# Patient Record
Sex: MALE | Race: WHITE | Employment: OTHER | URBAN - METROPOLITAN AREA
[De-identification: names, ages, dates, MRNs, and addresses within clinical notes are randomized per-mention and may not be internally consistent; named-entity substitution may affect disease eponyms.]

---

## 2017-01-22 ENCOUNTER — LAB ENCOUNTER (OUTPATIENT)
Dept: LAB | Facility: HOSPITAL | Age: 67
End: 2017-01-22
Attending: Other
Payer: COMMERCIAL

## 2017-01-22 DIAGNOSIS — Z79.899 NEED FOR PROPHYLACTIC CHEMOTHERAPY: Primary | ICD-10-CM

## 2017-01-22 LAB
ALBUMIN SERPL-MCNC: 3.8 G/DL (ref 3.5–4.8)
ALP LIVER SERPL-CCNC: 26 U/L (ref 45–117)
ALT SERPL-CCNC: 27 U/L (ref 17–63)
AST SERPL-CCNC: 9 U/L (ref 15–41)
BASOPHILS # BLD AUTO: 0.06 X10(3) UL (ref 0–0.1)
BASOPHILS NFR BLD AUTO: 0.8 %
BILIRUB SERPL-MCNC: 0.5 MG/DL (ref 0.1–2)
BUN BLD-MCNC: 15 MG/DL (ref 8–20)
CALCIUM BLD-MCNC: 8.9 MG/DL (ref 8.3–10.3)
CHLORIDE: 104 MMOL/L (ref 101–111)
CO2: 31 MMOL/L (ref 22–32)
CREAT BLD-MCNC: 0.85 MG/DL (ref 0.7–1.3)
EOSINOPHIL # BLD AUTO: 0.35 X10(3) UL (ref 0–0.3)
EOSINOPHIL NFR BLD AUTO: 4.7 %
ERYTHROCYTE [DISTWIDTH] IN BLOOD BY AUTOMATED COUNT: 12.2 % (ref 11.5–16)
EST. AVERAGE GLUCOSE BLD GHB EST-MCNC: 163 MG/DL (ref 68–126)
GLUCOSE BLD-MCNC: 146 MG/DL (ref 70–99)
HBA1C MFR BLD HPLC: 7.3 % (ref ?–5.7)
HCT VFR BLD AUTO: 42.4 % (ref 37–53)
HGB BLD-MCNC: 14.3 G/DL (ref 13–17)
IMMATURE GRANULOCYTE COUNT: 0.01 X10(3) UL (ref 0–1)
IMMATURE GRANULOCYTE RATIO %: 0.1 %
LYMPHOCYTES # BLD AUTO: 2.57 X10(3) UL (ref 0.9–4)
LYMPHOCYTES NFR BLD AUTO: 34.5 %
M PROTEIN MFR SERPL ELPH: 7.5 G/DL (ref 6.1–8.3)
MCH RBC QN AUTO: 30.8 PG (ref 27–33.2)
MCHC RBC AUTO-ENTMCNC: 33.7 G/DL (ref 31–37)
MCV RBC AUTO: 91.2 FL (ref 80–99)
MONOCYTES # BLD AUTO: 0.66 X10(3) UL (ref 0.1–0.6)
MONOCYTES NFR BLD AUTO: 8.9 %
NEUTROPHIL ABS PRELIM: 3.8 X10 (3) UL (ref 1.3–6.7)
NEUTROPHILS # BLD AUTO: 3.8 X10(3) UL (ref 1.3–6.7)
NEUTROPHILS NFR BLD AUTO: 51 %
PLATELET # BLD AUTO: 324 10(3)UL (ref 150–450)
POTASSIUM SERPL-SCNC: 4.4 MMOL/L (ref 3.6–5.1)
RBC # BLD AUTO: 4.65 X10(6)UL (ref 3.8–5.8)
RED CELL DISTRIBUTION WIDTH-SD: 40.4 FL (ref 35.1–46.3)
SODIUM SERPL-SCNC: 141 MMOL/L (ref 136–144)
WBC # BLD AUTO: 7.5 X10(3) UL (ref 4–13)

## 2017-01-22 PROCEDURE — 85025 COMPLETE CBC W/AUTO DIFF WBC: CPT

## 2017-01-22 PROCEDURE — 83036 HEMOGLOBIN GLYCOSYLATED A1C: CPT

## 2017-01-22 PROCEDURE — 80053 COMPREHEN METABOLIC PANEL: CPT

## 2017-03-10 ENCOUNTER — OFFICE VISIT (OUTPATIENT)
Dept: INTERNAL MEDICINE CLINIC | Facility: CLINIC | Age: 67
End: 2017-03-10

## 2017-03-10 ENCOUNTER — APPOINTMENT (OUTPATIENT)
Dept: LAB | Age: 67
End: 2017-03-10
Attending: INTERNAL MEDICINE
Payer: COMMERCIAL

## 2017-03-10 VITALS
WEIGHT: 188.19 LBS | HEIGHT: 70 IN | HEART RATE: 68 BPM | RESPIRATION RATE: 16 BRPM | SYSTOLIC BLOOD PRESSURE: 134 MMHG | DIASTOLIC BLOOD PRESSURE: 74 MMHG | BODY MASS INDEX: 26.94 KG/M2

## 2017-03-10 DIAGNOSIS — Z12.11 SPECIAL SCREENING FOR MALIGNANT NEOPLASMS, COLON: ICD-10-CM

## 2017-03-10 DIAGNOSIS — Z00.00 ROUTINE GENERAL MEDICAL EXAMINATION AT A HEALTH CARE FACILITY: Primary | ICD-10-CM

## 2017-03-10 DIAGNOSIS — IMO0001 UNCONTROLLED TYPE 2 DIABETES MELLITUS WITHOUT COMPLICATION, WITHOUT LONG-TERM CURRENT USE OF INSULIN: ICD-10-CM

## 2017-03-10 DIAGNOSIS — K40.90 LEFT INGUINAL HERNIA: ICD-10-CM

## 2017-03-10 DIAGNOSIS — E11.65 TYPE 2 DIABETES MELLITUS WITH HYPERGLYCEMIA, WITHOUT LONG-TERM CURRENT USE OF INSULIN (HCC): ICD-10-CM

## 2017-03-10 LAB
CHOLEST SMN-MCNC: 152 MG/DL (ref ?–200)
CREAT UR-SCNC: 54.8 MG/DL
HDLC SERPL-MCNC: 44 MG/DL (ref 45–?)
HDLC SERPL: 3.45 {RATIO} (ref ?–4.97)
LDLC SERPL CALC-MCNC: 83 MG/DL (ref ?–130)
MICROALBUMIN UR-MCNC: <0.5 MG/DL
NONHDLC SERPL-MCNC: 108 MG/DL (ref ?–130)
TRIGLYCERIDES: 127 MG/DL (ref ?–150)
VLDL: 25 MG/DL (ref 5–40)

## 2017-03-10 PROCEDURE — 90670 PCV13 VACCINE IM: CPT | Performed by: INTERNAL MEDICINE

## 2017-03-10 PROCEDURE — 82570 ASSAY OF URINE CREATININE: CPT

## 2017-03-10 PROCEDURE — 90471 IMMUNIZATION ADMIN: CPT | Performed by: INTERNAL MEDICINE

## 2017-03-10 PROCEDURE — 90715 TDAP VACCINE 7 YRS/> IM: CPT | Performed by: INTERNAL MEDICINE

## 2017-03-10 PROCEDURE — 90472 IMMUNIZATION ADMIN EACH ADD: CPT | Performed by: INTERNAL MEDICINE

## 2017-03-10 PROCEDURE — 80061 LIPID PANEL: CPT

## 2017-03-10 PROCEDURE — 99387 INIT PM E/M NEW PAT 65+ YRS: CPT | Performed by: INTERNAL MEDICINE

## 2017-03-10 PROCEDURE — 82043 UR ALBUMIN QUANTITATIVE: CPT

## 2017-03-10 RX ORDER — MEMANTINE HYDROCHLORIDE 10 MG/1
TABLET ORAL
Refills: 4 | COMMUNITY
Start: 2017-02-09 | End: 2017-03-10

## 2017-03-10 NOTE — PROGRESS NOTES
HPI:    Patient ID: Erin Spencer is a 73year old male. HPI  New pt here to establish as new pt, states uncontrolled dm, htn, hyperchol, states his wife is a psychiatrist who gives him namenda for decreased memory.   Pt states has not had an official time. He appears well-developed and well-nourished. HENT:   Head: Normocephalic and atraumatic. Mouth/Throat: No oropharyngeal exudate. Eyes: Pupils are equal, round, and reactive to light. Neck: Neck supple.    Cardiovascular: Normal rate, regular

## 2017-03-16 ENCOUNTER — TELEPHONE (OUTPATIENT)
Dept: INTERNAL MEDICINE CLINIC | Facility: CLINIC | Age: 67
End: 2017-03-16

## 2017-03-16 ENCOUNTER — OFFICE VISIT (OUTPATIENT)
Dept: INTERNAL MEDICINE CLINIC | Facility: CLINIC | Age: 67
End: 2017-03-16

## 2017-03-16 VITALS
SYSTOLIC BLOOD PRESSURE: 134 MMHG | DIASTOLIC BLOOD PRESSURE: 87 MMHG | HEART RATE: 112 BPM | BODY MASS INDEX: 26.77 KG/M2 | WEIGHT: 187 LBS | HEIGHT: 70 IN | RESPIRATION RATE: 16 BRPM | TEMPERATURE: 99 F

## 2017-03-16 DIAGNOSIS — R07.81 PLEURITIC CHEST PAIN: Primary | ICD-10-CM

## 2017-03-16 DIAGNOSIS — R53.81 MALAISE AND FATIGUE: ICD-10-CM

## 2017-03-16 DIAGNOSIS — R52 BODY ACHES: ICD-10-CM

## 2017-03-16 DIAGNOSIS — R53.83 MALAISE AND FATIGUE: ICD-10-CM

## 2017-03-16 DIAGNOSIS — J06.9 URI, ACUTE: ICD-10-CM

## 2017-03-16 PROBLEM — IMO0002 UNCONTROLLED TYPE 2 DIABETES MELLITUS: Status: ACTIVE | Noted: 2017-03-16

## 2017-03-16 PROBLEM — E11.65 UNCONTROLLED TYPE 2 DIABETES MELLITUS (HCC): Status: ACTIVE | Noted: 2017-03-16

## 2017-03-16 PROCEDURE — 93000 ELECTROCARDIOGRAM COMPLETE: CPT | Performed by: PHYSICIAN ASSISTANT

## 2017-03-16 PROCEDURE — 99213 OFFICE O/P EST LOW 20 MIN: CPT | Performed by: PHYSICIAN ASSISTANT

## 2017-03-16 RX ORDER — CIPROFLOXACIN 500 MG/1
1 TABLET, FILM COATED ORAL 2 TIMES DAILY
COMMUNITY
Start: 2017-03-14 | End: 2017-04-21

## 2017-03-16 RX ORDER — AMOXICILLIN AND CLAVULANATE POTASSIUM 500; 125 MG/1; MG/1
1 TABLET, FILM COATED ORAL 3 TIMES DAILY
COMMUNITY
Start: 2017-03-14 | End: 2017-04-21 | Stop reason: ALTCHOICE

## 2017-03-16 NOTE — PROGRESS NOTES
Patient presents with:  Chest Pain: this morning pt states he is not having chest pain now. Reaction: to injection pt had swelling on both hands mostly on the left.  Pt states he was having flu like symptoms due to injection given and is taking antibiotic (pediatric)      AHI 6 autoPAP 6-16 Sleep RX        Patient Active Problem List:     Hematuria     Stone (use calc)     BAKARI (obstructive sleep apnea)     Uncontrolled [facility]        Current Outpatient Prescriptions:  Ciprofloxacin HC warm and dry. No rash noted. No erythema. No pallor. EKG:  Sinus tach, normal axis, No acute changes. A/P:    Pleuritic chest pain  (primary encounter diagnosis) - EKG OK. Resolved now. ? Related to viral syndrome.   Pt declined further testing for

## 2017-03-16 NOTE — TELEPHONE ENCOUNTER
Pt's wife, Myah Powell ([address] [address] per HIPAA), called stating that the  would not let her talk to the physician and transferred to triage.   I took the call to further inquire about sx- wife immediately stated I am a physician and need to speak to pt's phys

## 2017-03-16 NOTE — TELEPHONE ENCOUNTER
I attempted to contact wife at number listed below. It was out of service. I called the pt's cell, no answer. I then called the pt's home and was able to speak to the patient.   He had a \"cramping bilateral\" chest pain this morning that was relieved by

## 2017-03-17 ENCOUNTER — OFFICE VISIT (OUTPATIENT)
Dept: ENDOCRINOLOGY CLINIC | Facility: CLINIC | Age: 67
End: 2017-03-17

## 2017-03-17 VITALS
SYSTOLIC BLOOD PRESSURE: 112 MMHG | RESPIRATION RATE: 17 BRPM | HEIGHT: 70 IN | HEART RATE: 68 BPM | TEMPERATURE: 99 F | WEIGHT: 188.19 LBS | DIASTOLIC BLOOD PRESSURE: 74 MMHG | BODY MASS INDEX: 26.94 KG/M2

## 2017-03-17 DIAGNOSIS — IMO0001 UNCONTROLLED TYPE 2 DIABETES MELLITUS WITHOUT COMPLICATION, WITHOUT LONG-TERM CURRENT USE OF INSULIN: Primary | ICD-10-CM

## 2017-03-17 PROCEDURE — 99213 OFFICE O/P EST LOW 20 MIN: CPT | Performed by: INTERNAL MEDICINE

## 2017-03-17 RX ORDER — SITAGLIPTIN 100 MG/1
100 TABLET, FILM COATED ORAL DAILY
Qty: 90 TABLET | Refills: 1 | Status: SHIPPED | OUTPATIENT
Start: 2017-03-17 | End: 2017-09-01

## 2017-03-17 NOTE — PROGRESS NOTES
HPI:    Patient ID: Madonna Ng is a 73year old male.     HPI  Here for uncontrolled dm, didn't bring sugars, reminded again, flu like post vacine syndrome resolving, labs reviewed  /74 mmHg  Pulse 68  Temp(Src) 98.5 °F (36.9 °C) (Oral)  Resp 17 Pupils are equal, round, and reactive to light. Cardiovascular: Normal rate, regular rhythm and normal heart sounds. No murmur heard. Pulmonary/Chest: Effort normal and breath sounds normal.   Musculoskeletal: He exhibits no edema.    Neurological: He

## 2017-03-17 NOTE — PROGRESS NOTES
Pt new to [address]   Explained balanced plate (limit starch, include protein, eat heart healthy), A1C grid, natual progression of T2DB (and importance of regular physical activity and good BG control to possibly limit meds and slow progression), Rule of 1

## 2017-03-20 ENCOUNTER — OFFICE VISIT (OUTPATIENT)
Dept: INTERNAL MEDICINE CLINIC | Facility: CLINIC | Age: 67
End: 2017-03-20

## 2017-03-20 VITALS
HEIGHT: 70 IN | BODY MASS INDEX: 26.63 KG/M2 | WEIGHT: 186 LBS | TEMPERATURE: 99 F | SYSTOLIC BLOOD PRESSURE: 122 MMHG | DIASTOLIC BLOOD PRESSURE: 66 MMHG | HEART RATE: 76 BPM

## 2017-03-20 DIAGNOSIS — B00.9 HERPES SIMPLEX: ICD-10-CM

## 2017-03-20 DIAGNOSIS — L29.9 ITCHING: Primary | ICD-10-CM

## 2017-03-20 PROCEDURE — 99213 OFFICE O/P EST LOW 20 MIN: CPT | Performed by: NURSE PRACTITIONER

## 2017-03-20 RX ORDER — FAMCICLOVIR 500 MG/1
TABLET, FILM COATED ORAL
Qty: 9 TABLET | Refills: 1 | Status: SHIPPED | OUTPATIENT
Start: 2017-03-20 | End: 2018-11-28 | Stop reason: ALTCHOICE

## 2017-03-20 RX ORDER — RANITIDINE 150 MG/1
150 TABLET ORAL 2 TIMES DAILY
Qty: 60 TABLET | Refills: 0 | Status: SHIPPED | OUTPATIENT
Start: 2017-03-20 | End: 2017-04-21

## 2017-03-20 NOTE — PROGRESS NOTES
Dominic Nicholas is a 73year old male. Patient presents with:  Itching: His hands are itchy immediatley after he was given vaccines on 3/10/17      HPI:   Presents with bilateral hand itching. Seen in our office 3/10 for CPE, 3/16 by MIRIAM and 3/17 by Bashir Norman. Disp:  Rfl: 1   Calcium Carbonate Antacid 600 MG Oral Chew Tab Chew 600 mg by mouth. Disp:  Rfl:    Coenzyme Q10 (COQ10) 400 MG Oral Cap Take by mouth.  Disp:  Rfl:    Atorvastatin Calcium 10 MG Oral Tab  Disp:  Rfl:    lisinopril 10 MG Oral Tab  Disp:  Rfl clear to auscultation  CARDIO: RRR without murmur  PSYCH: alert and oriented x 3    ASSESSMENT AND PLAN:     Itching  (primary encounter diagnosis)  benadrly hs only secondary to sedation.    Add fexofenadine daily as well as I4Yqcmsct bid.  triamcinalone c

## 2017-04-17 RX ORDER — SITAGLIPTIN 100 MG/1
100 TABLET, FILM COATED ORAL DAILY
Qty: 90 TABLET | Refills: 1 | OUTPATIENT
Start: 2017-04-17

## 2017-04-17 NOTE — TELEPHONE ENCOUNTER
Called [facility] and spoke with pharmacist Isiah. She states the patient had received a 90 day supply last month and will not need this refill. Isiah states she will contact the patient to let him know. Encounter closing.

## 2017-04-17 NOTE — TELEPHONE ENCOUNTER
From: Sj Mcgowan  To: Thomas Lazcano MD  Sent: 4/16/2017 3:13 PM CDT  Subject: Medication Renewal Request    Original authorizing provider: MD Sj Hardy would like a refill of the following medications:  Brittani Frausto [address] [place] [address]

## 2017-04-21 ENCOUNTER — OFFICE VISIT (OUTPATIENT)
Dept: ENDOCRINOLOGY CLINIC | Facility: CLINIC | Age: 67
End: 2017-04-21

## 2017-04-21 VITALS
TEMPERATURE: 98 F | WEIGHT: 185 LBS | HEIGHT: 75 IN | DIASTOLIC BLOOD PRESSURE: 72 MMHG | BODY MASS INDEX: 23 KG/M2 | HEART RATE: 70 BPM | SYSTOLIC BLOOD PRESSURE: 124 MMHG | RESPIRATION RATE: 16 BRPM

## 2017-04-21 DIAGNOSIS — IMO0001 UNCONTROLLED TYPE 2 DIABETES MELLITUS WITHOUT COMPLICATION, WITHOUT LONG-TERM CURRENT USE OF INSULIN: Primary | ICD-10-CM

## 2017-04-21 PROCEDURE — 99213 OFFICE O/P EST LOW 20 MIN: CPT | Performed by: INTERNAL MEDICINE

## 2017-04-21 RX ORDER — EMPAGLIFLOZIN 10 MG/1
TABLET, FILM COATED ORAL
Refills: 1 | COMMUNITY
Start: 2017-04-11 | End: 2017-04-21

## 2017-04-21 RX ORDER — MEMANTINE HYDROCHLORIDE 10 MG/1
TABLET ORAL
Refills: 4 | COMMUNITY
Start: 2017-04-11 | End: 2017-09-13

## 2017-04-21 NOTE — PROGRESS NOTES
HPI:    Patient ID: Hemant Moya is a 73year old male.     HPI  Here for dm, uncontrolled, high sugars in am, taking meds, no exercising much, wt up  /72 mmHg  Pulse 70  Temp(Src) 98.2 °F (36.8 °C) (Oral)  Resp 16  Ht 75\"  Wt 185 lb  BMI 23.12 kg Musculoskeletal: He exhibits no edema. Neurological: He is oriented to person, place, and time. Skin: He is not diaphoretic.               ASSESSMENT/PLAN:   Uncontrolled type 2 diabetes mellitus without complication, without long-term current use of

## 2017-04-21 NOTE — PROGRESS NOTES
Recommendation: increase exercise from 2 days/week to 4    Duration of diabetes: 5-6 years    Failed DB meds: n/a    Changes made LOV: 3/17 added Jardiance 10 mg    Wt/A1C: wt up from LOV, A1C 7.3% 1/22, to be re-checked in July    Diet: discussed adding p

## 2017-05-08 RX ORDER — EMPAGLIFLOZIN 10 MG/1
TABLET, FILM COATED ORAL
Qty: 30 TABLET | Refills: 3 | Status: SHIPPED | OUTPATIENT
Start: 2017-05-08 | End: 2017-09-25

## 2017-05-15 RX ORDER — MEMANTINE HYDROCHLORIDE 10 MG/1
TABLET ORAL
Qty: 60 TABLET | Refills: 0 | OUTPATIENT
Start: 2017-05-15

## 2017-05-15 NOTE — TELEPHONE ENCOUNTER
LOV: 4/21/17  Upcoming visit: No upcoming visit   Last labs: 3/10/17 Lipid 1/22/17 Cbc A1c   Last RX: 4/11/17 #4 Refills  Per protocol: Route to provider

## 2017-06-15 ENCOUNTER — PATIENT MESSAGE (OUTPATIENT)
Dept: ENDOCRINOLOGY CLINIC | Facility: CLINIC | Age: 67
End: 2017-06-15

## 2017-06-15 RX ORDER — ATORVASTATIN CALCIUM 10 MG/1
10 TABLET, FILM COATED ORAL DAILY
Qty: 90 TABLET | Refills: 0 | Status: SHIPPED | OUTPATIENT
Start: 2017-06-15 | End: 2017-09-07

## 2017-06-15 NOTE — TELEPHONE ENCOUNTER
From: Yamil Coronado  To: Mandeep Still MD  Sent: 6/15/2017 9:16 AM CDT  Subject: Other    Dear Dr. Naun Egan:    Please ordwe Metformin 1000mg twice a day and Atorvastatin 10mg a day. Thanks!     Preston Bauman

## 2017-07-25 ENCOUNTER — APPOINTMENT (OUTPATIENT)
Dept: LAB | Facility: HOSPITAL | Age: 67
End: 2017-07-25
Attending: INTERNAL MEDICINE
Payer: COMMERCIAL

## 2017-07-25 DIAGNOSIS — IMO0001 UNCONTROLLED TYPE 2 DIABETES MELLITUS WITHOUT COMPLICATION, WITHOUT LONG-TERM CURRENT USE OF INSULIN: ICD-10-CM

## 2017-07-25 LAB
ALBUMIN SERPL-MCNC: 3.9 G/DL (ref 3.5–4.8)
ALP LIVER SERPL-CCNC: 24 U/L (ref 45–117)
ALT SERPL-CCNC: 29 U/L (ref 17–63)
AST SERPL-CCNC: 12 U/L (ref 15–41)
BILIRUB SERPL-MCNC: 0.5 MG/DL (ref 0.1–2)
BUN BLD-MCNC: 10 MG/DL (ref 8–20)
CALCIUM BLD-MCNC: 9.3 MG/DL (ref 8.3–10.3)
CHLORIDE: 104 MMOL/L (ref 101–111)
CO2: 29 MMOL/L (ref 22–32)
CREAT BLD-MCNC: 0.81 MG/DL (ref 0.7–1.3)
EST. AVERAGE GLUCOSE BLD GHB EST-MCNC: 140 MG/DL (ref 68–126)
GLUCOSE BLD-MCNC: 109 MG/DL (ref 70–99)
HBA1C MFR BLD HPLC: 6.5 % (ref ?–5.7)
M PROTEIN MFR SERPL ELPH: 7.8 G/DL (ref 6.1–8.3)
POTASSIUM SERPL-SCNC: 4.2 MMOL/L (ref 3.6–5.1)
SODIUM SERPL-SCNC: 140 MMOL/L (ref 136–144)

## 2017-07-25 PROCEDURE — 80053 COMPREHEN METABOLIC PANEL: CPT

## 2017-07-25 PROCEDURE — 36415 COLL VENOUS BLD VENIPUNCTURE: CPT

## 2017-07-25 PROCEDURE — 83036 HEMOGLOBIN GLYCOSYLATED A1C: CPT

## 2017-08-02 ENCOUNTER — MED REC SCAN ONLY (OUTPATIENT)
Dept: INTERNAL MEDICINE CLINIC | Facility: CLINIC | Age: 67
End: 2017-08-02

## 2017-08-04 ENCOUNTER — OFFICE VISIT (OUTPATIENT)
Dept: ENDOCRINOLOGY CLINIC | Facility: CLINIC | Age: 67
End: 2017-08-04

## 2017-08-04 VITALS
WEIGHT: 180 LBS | DIASTOLIC BLOOD PRESSURE: 82 MMHG | HEART RATE: 78 BPM | HEIGHT: 70 IN | SYSTOLIC BLOOD PRESSURE: 130 MMHG | TEMPERATURE: 98 F | BODY MASS INDEX: 25.77 KG/M2 | RESPIRATION RATE: 16 BRPM

## 2017-08-04 DIAGNOSIS — N18.1 TYPE 2 DM WITH CKD STAGE 1 AND HYPERTENSION (HCC): Primary | ICD-10-CM

## 2017-08-04 DIAGNOSIS — I12.9 TYPE 2 DM WITH CKD STAGE 1 AND HYPERTENSION (HCC): Primary | ICD-10-CM

## 2017-08-04 DIAGNOSIS — Z12.11 SPECIAL SCREENING FOR MALIGNANT NEOPLASMS, COLON: ICD-10-CM

## 2017-08-04 DIAGNOSIS — E11.22 TYPE 2 DM WITH CKD STAGE 1 AND HYPERTENSION (HCC): Primary | ICD-10-CM

## 2017-08-04 PROCEDURE — 99213 OFFICE O/P EST LOW 20 MIN: CPT | Performed by: INTERNAL MEDICINE

## 2017-08-04 RX ORDER — DORZOLAMIDE HYDROCHLORIDE AND TIMOLOL MALEATE 20; 5 MG/ML; MG/ML
SOLUTION/ DROPS OPHTHALMIC
COMMUNITY
Start: 2017-07-10 | End: 2020-09-30

## 2017-08-04 RX ORDER — EMPAGLIFLOZIN 25 MG/1
TABLET, FILM COATED ORAL
COMMUNITY
Start: 2017-07-26 | End: 2017-10-16

## 2017-08-04 NOTE — PROGRESS NOTES
Recommendation: keep up the good work!     Duration of diabetes: 5-6 years    Failed DB meds: n/a    Changes made LOV: 4/21 Jardiance increased to 25 mg    Wt/A1C: wt down 5#; A1C improved to 6.5% 7/25 from 7.3% 1/22    Diet: reduced bread and changed dinne

## 2017-08-04 NOTE — PROGRESS NOTES
HPI:    Patient ID: Hemant Moya is a 78year old male.     HPI  Here for dm, bp up a bit, states historically better, still has not gotten cscope, willing to do stool cards, and later due cscope if remembers  /82 (BP Location: Left arm, Patient Po oriented to person, place, and time. He appears well-developed and well-nourished. HENT:   Head: Normocephalic and atraumatic. Eyes: Pupils are equal, round, and reactive to light. Cardiovascular: Normal rate, regular rhythm and normal heart sounds.

## 2017-08-25 ENCOUNTER — PATIENT MESSAGE (OUTPATIENT)
Dept: ENDOCRINOLOGY CLINIC | Facility: CLINIC | Age: 67
End: 2017-08-25

## 2017-08-28 RX ORDER — LISINOPRIL 10 MG/1
10 TABLET ORAL DAILY
Qty: 30 TABLET | Refills: 1 | Status: SHIPPED | OUTPATIENT
Start: 2017-08-28 | End: 2017-10-20

## 2017-09-01 RX ORDER — SITAGLIPTIN 100 MG/1
TABLET, FILM COATED ORAL
Qty: 30 TABLET | Refills: 0 | Status: SHIPPED | OUTPATIENT
Start: 2017-09-01 | End: 2017-10-03

## 2017-09-08 RX ORDER — ATORVASTATIN CALCIUM 10 MG/1
TABLET, FILM COATED ORAL
Qty: 90 TABLET | Refills: 0 | Status: SHIPPED | OUTPATIENT
Start: 2017-09-08 | End: 2017-12-15

## 2017-09-13 RX ORDER — MEMANTINE HYDROCHLORIDE 10 MG/1
TABLET ORAL
Qty: 60 TABLET | Refills: 4 | Status: SHIPPED | OUTPATIENT
Start: 2017-09-13 | End: 2017-09-19

## 2017-09-19 RX ORDER — MEMANTINE HYDROCHLORIDE 10 MG/1
TABLET ORAL
Qty: 60 TABLET | Refills: 6 | Status: SHIPPED | OUTPATIENT
Start: 2017-09-19 | End: 2018-02-16

## 2017-10-03 RX ORDER — SITAGLIPTIN 100 MG/1
TABLET, FILM COATED ORAL
Qty: 30 TABLET | Refills: 0 | Status: SHIPPED | OUTPATIENT
Start: 2017-10-03 | End: 2017-11-02

## 2017-10-16 RX ORDER — EMPAGLIFLOZIN 25 MG/1
TABLET, FILM COATED ORAL
Qty: 30 TABLET | Refills: 3 | Status: SHIPPED | OUTPATIENT
Start: 2017-10-16 | End: 2018-01-15

## 2017-10-23 RX ORDER — LISINOPRIL 10 MG/1
TABLET ORAL
Qty: 30 TABLET | Refills: 0 | Status: SHIPPED | OUTPATIENT
Start: 2017-10-23 | End: 2017-11-20

## 2017-11-02 RX ORDER — SITAGLIPTIN 100 MG/1
TABLET, FILM COATED ORAL
Qty: 30 TABLET | Refills: 0 | Status: SHIPPED | OUTPATIENT
Start: 2017-11-02 | End: 2017-11-29

## 2017-11-21 RX ORDER — LISINOPRIL 10 MG/1
TABLET ORAL
Qty: 30 TABLET | Refills: 6 | Status: SHIPPED | OUTPATIENT
Start: 2017-11-21 | End: 2018-06-03

## 2017-11-29 RX ORDER — SITAGLIPTIN 100 MG/1
TABLET, FILM COATED ORAL
Qty: 30 TABLET | Refills: 5 | Status: SHIPPED | OUTPATIENT
Start: 2017-11-29 | End: 2018-05-22

## 2017-12-16 RX ORDER — ATORVASTATIN CALCIUM 10 MG/1
TABLET, FILM COATED ORAL
Qty: 90 TABLET | Refills: 0 | Status: SHIPPED | OUTPATIENT
Start: 2017-12-16 | End: 2018-03-14

## 2017-12-19 ENCOUNTER — OFFICE VISIT (OUTPATIENT)
Dept: SURGERY | Facility: CLINIC | Age: 67
End: 2017-12-19

## 2017-12-19 VITALS
WEIGHT: 175 LBS | BODY MASS INDEX: 25.05 KG/M2 | HEIGHT: 70 IN | SYSTOLIC BLOOD PRESSURE: 160 MMHG | HEART RATE: 76 BPM | TEMPERATURE: 99 F | DIASTOLIC BLOOD PRESSURE: 90 MMHG

## 2017-12-19 DIAGNOSIS — IMO0001 UNCONTROLLED TYPE 2 DIABETES MELLITUS WITHOUT COMPLICATION, WITHOUT LONG-TERM CURRENT USE OF INSULIN: ICD-10-CM

## 2017-12-19 DIAGNOSIS — K40.20 NON-RECURRENT BILATERAL INGUINAL HERNIA WITHOUT OBSTRUCTION OR GANGRENE: Primary | ICD-10-CM

## 2017-12-19 DIAGNOSIS — Z01.818 PREOP TESTING: Primary | ICD-10-CM

## 2017-12-19 DIAGNOSIS — G47.33 OSA (OBSTRUCTIVE SLEEP APNEA): ICD-10-CM

## 2017-12-19 PROCEDURE — 99243 OFF/OP CNSLTJ NEW/EST LOW 30: CPT | Performed by: SURGERY

## 2017-12-19 NOTE — H&P
New Patient Visit Note       Active Problems      1. Non-recurrent bilateral inguinal hernia without obstruction or gangrene    2. BAKARI (obstructive sleep apnea)    3.  Uncontrolled type 2 diabetes mellitus without complication, without long-term current use TABLET(10 MG) BY MOUTH DAILY Disp: 90 tablet Rfl: 0   JANUVIA 100 MG Oral Tab TAKE ONE TABLET BY MOUTH EVERY DAY Disp: 30 tablet Rfl: 5   LISINOPRIL 10 MG Oral Tab TAKE 1 TABLET BY MOUTH EVERY DAY Disp: 30 tablet Rfl: 6   JARDIANCE 25 MG Oral Tab TAKE ONE easily. Psychiatric/Behavioral: Negative for behavioral problems and sleep disturbance.        Physical Findings   /90   Pulse 76   Temp 98.6 °F (37 °C)   Ht 70\"   Wt 175 lb   BMI 25.11 kg/m²   Physical Exam   Constitutional: He appears well-develo encounter. Imaging & Referrals   None    Follow Up  No Follow-up on file.     Karri Hernandez MD

## 2018-01-02 ENCOUNTER — LAB ENCOUNTER (OUTPATIENT)
Dept: LAB | Age: 68
End: 2018-01-02
Attending: INTERNAL MEDICINE
Payer: MEDICARE

## 2018-01-02 ENCOUNTER — OFFICE VISIT (OUTPATIENT)
Dept: INTERNAL MEDICINE CLINIC | Facility: CLINIC | Age: 68
End: 2018-01-02

## 2018-01-02 VITALS
WEIGHT: 178 LBS | RESPIRATION RATE: 15 BRPM | TEMPERATURE: 98 F | SYSTOLIC BLOOD PRESSURE: 120 MMHG | HEART RATE: 56 BPM | HEIGHT: 70 IN | BODY MASS INDEX: 25.48 KG/M2 | DIASTOLIC BLOOD PRESSURE: 76 MMHG

## 2018-01-02 DIAGNOSIS — IMO0001 UNCONTROLLED TYPE 2 DIABETES MELLITUS WITHOUT COMPLICATION, WITHOUT LONG-TERM CURRENT USE OF INSULIN: ICD-10-CM

## 2018-01-02 DIAGNOSIS — E78.49 OTHER HYPERLIPIDEMIA: ICD-10-CM

## 2018-01-02 DIAGNOSIS — Z01.818 PREOP TESTING: ICD-10-CM

## 2018-01-02 DIAGNOSIS — Z01.818 PRE-OP EXAMINATION: Primary | ICD-10-CM

## 2018-01-02 DIAGNOSIS — I10 ESSENTIAL HYPERTENSION: ICD-10-CM

## 2018-01-02 DIAGNOSIS — G47.33 OSA (OBSTRUCTIVE SLEEP APNEA): ICD-10-CM

## 2018-01-02 LAB
ALBUMIN SERPL-MCNC: 3.9 G/DL (ref 3.5–4.8)
ALP LIVER SERPL-CCNC: 22 U/L (ref 45–117)
ALT SERPL-CCNC: 39 U/L (ref 17–63)
AST SERPL-CCNC: 15 U/L (ref 15–41)
BILIRUB SERPL-MCNC: 0.5 MG/DL (ref 0.1–2)
BUN BLD-MCNC: 12 MG/DL (ref 8–20)
CALCIUM BLD-MCNC: 9.1 MG/DL (ref 8.3–10.3)
CHLORIDE: 104 MMOL/L (ref 101–111)
CO2: 30 MMOL/L (ref 22–32)
CREAT BLD-MCNC: 0.78 MG/DL (ref 0.7–1.3)
EST. AVERAGE GLUCOSE BLD GHB EST-MCNC: 163 MG/DL (ref 68–126)
GLUCOSE BLD-MCNC: 125 MG/DL (ref 70–99)
HBA1C MFR BLD HPLC: 7.3 % (ref ?–5.7)
M PROTEIN MFR SERPL ELPH: 7.9 G/DL (ref 6.1–8.3)
POTASSIUM SERPL-SCNC: 4.3 MMOL/L (ref 3.6–5.1)
SODIUM SERPL-SCNC: 139 MMOL/L (ref 136–144)

## 2018-01-02 PROCEDURE — 93000 ELECTROCARDIOGRAM COMPLETE: CPT | Performed by: INTERNAL MEDICINE

## 2018-01-02 PROCEDURE — 80053 COMPREHEN METABOLIC PANEL: CPT

## 2018-01-02 PROCEDURE — 83036 HEMOGLOBIN GLYCOSYLATED A1C: CPT

## 2018-01-02 PROCEDURE — 99214 OFFICE O/P EST MOD 30 MIN: CPT | Performed by: INTERNAL MEDICINE

## 2018-01-02 NOTE — PROGRESS NOTES
Nasrin Anton is a 78year old male. Patient presents with:  Pre-Op Exam: Room 3. Dr Urmila Yan, 01/04/2018.  Not fasting       HPI:     Patient with dm2, BAKARI on CPAP,HTN, HL, bilateral inguinal hernia here for pre op exam for laparoscopic bilateral inguinal 25 MG Oral Tab TAKE ONE TABLET BY MOUTH EVERY DAY Disp: 30 tablet Rfl: 3      Past Medical History:   Diagnosis Date   • Hematuria    • Kidney stones    • Obstructive sleep apnea (adult) (pediatric)     AHI 6 autoPAP 6-16 Sleep RX    • Type II or unspecifi hold diabetic medications day of surgery  HTN- controlled, CPM  Hyperlipidemia- on statin, CPM      Orders Placed This Encounter      Hemoglobin A1C    Meds & Refills for this Visit:  No prescriptions requested or ordered in this encounter    Imaging & Con

## 2018-01-09 ENCOUNTER — TELEPHONE (OUTPATIENT)
Dept: ENDOCRINOLOGY CLINIC | Facility: CLINIC | Age: 68
End: 2018-01-09

## 2018-01-09 NOTE — TELEPHONE ENCOUNTER
Received a fax from [facility] that jardiance 25mg is not covered by insurance can it be switch to  invokana or farxiga.

## 2018-01-12 NOTE — TELEPHONE ENCOUNTER
Please call pt to schedule f/up soon to discuss alternatives to Jardiance as insurance will not cover.

## 2018-01-15 NOTE — TELEPHONE ENCOUNTER
Checked w/pharmacy & Gene Agnes is covered w/no copay. Pt. contacted regarding prescription change & informed he needs to schedule a follow-up appt. To review medication change. Call transferred to scheduling.

## 2018-01-16 NOTE — TELEPHONE ENCOUNTER
Future Appointments  Date Time Provider Bart Sarmiento   2018 12:45 PM Silvestre Apgar, MD [facility]   2018 9:45 AM Sonya Jones MD [facility]   2018 11:00 AM HERMINIA Ledbetter [facility]

## 2018-01-19 ENCOUNTER — OFFICE VISIT (OUTPATIENT)
Dept: SURGERY | Facility: CLINIC | Age: 68
End: 2018-01-19

## 2018-01-19 VITALS
SYSTOLIC BLOOD PRESSURE: 139 MMHG | BODY MASS INDEX: 25.65 KG/M2 | HEART RATE: 68 BPM | HEIGHT: 70 IN | DIASTOLIC BLOOD PRESSURE: 81 MMHG | WEIGHT: 179.19 LBS | RESPIRATION RATE: 16 BRPM

## 2018-01-19 DIAGNOSIS — K40.20 NON-RECURRENT BILATERAL INGUINAL HERNIA WITHOUT OBSTRUCTION OR GANGRENE: Primary | ICD-10-CM

## 2018-01-19 PROCEDURE — 99024 POSTOP FOLLOW-UP VISIT: CPT | Performed by: SURGERY

## 2018-01-19 NOTE — PROGRESS NOTES
Post Operative Visit Note       Active Problems  1.  Non-recurrent bilateral inguinal hernia without obstruction or gangrene         Chief Complaint   Patient presents with:  Post-Op: 1st post op visit--BILATERAL LAPAROSCOPIC INGUINAL HERNIA REPAIR WITH MES MEALS Disp: 180 tablet Rfl: 0   atorvastatin 10 MG Oral Tab TAKE 1 TABLET(10 MG) BY MOUTH DAILY Disp: 90 tablet Rfl: 0   JANUVIA 100 MG Oral Tab TAKE ONE TABLET BY MOUTH EVERY DAY Disp: 30 tablet Rfl: 5   LISINOPRIL 10 MG Oral Tab TAKE 1 TABLET BY MOUTH EV Psychiatric/Behavioral: Negative for behavioral problems and sleep disturbance.        Physical Findings   /81   Pulse 68   Resp 16   Ht 70\"   Wt 179 lb 3.2 oz   BMI 25.71 kg/m²   Physical Exam   Constitutional: He appears well-developed and well-n

## 2018-01-26 ENCOUNTER — OFFICE VISIT (OUTPATIENT)
Dept: ENDOCRINOLOGY CLINIC | Facility: CLINIC | Age: 68
End: 2018-01-26

## 2018-01-26 VITALS
OXYGEN SATURATION: 98 % | HEIGHT: 70 IN | BODY MASS INDEX: 25.34 KG/M2 | HEART RATE: 80 BPM | RESPIRATION RATE: 16 BRPM | DIASTOLIC BLOOD PRESSURE: 70 MMHG | SYSTOLIC BLOOD PRESSURE: 124 MMHG | WEIGHT: 177 LBS | TEMPERATURE: 99 F

## 2018-01-26 DIAGNOSIS — M72.2 PLANTAR FASCIITIS: ICD-10-CM

## 2018-01-26 DIAGNOSIS — I10 ESSENTIAL HYPERTENSION: ICD-10-CM

## 2018-01-26 DIAGNOSIS — E11.65 UNCONTROLLED TYPE 2 DIABETES MELLITUS WITH HYPERGLYCEMIA, WITHOUT LONG-TERM CURRENT USE OF INSULIN (HCC): Primary | ICD-10-CM

## 2018-01-26 DIAGNOSIS — E78.49 OTHER HYPERLIPIDEMIA: ICD-10-CM

## 2018-01-26 DIAGNOSIS — Z12.11 SPECIAL SCREENING FOR MALIGNANT NEOPLASMS, COLON: ICD-10-CM

## 2018-01-26 PROCEDURE — 99213 OFFICE O/P EST LOW 20 MIN: CPT | Performed by: INTERNAL MEDICINE

## 2018-01-26 NOTE — PROGRESS NOTES
Recommendation: increase exercise now that he's retired; eat main meal at lunch instead of dinner or eat dinner at 6pm and have light snack with wife 8-9pm    Failed DB meds: jardiance no longer covered    Wt/A1C: wt down 3#; A1C up to 7.3% (1/2/18) from 6

## 2018-01-26 NOTE — PROGRESS NOTES
HPI:    Patient ID: Magalie Mensah is a 78year old male.     HPI  Here for dm, no sugars with him, hba1c higher, never turned in stool cards, declines flu shot, co few months right heal pain, worse with walking first in am, better with rest, no other asso Allergies:  Flu Virus Vaccine       Itching   PHYSICAL EXAM:   Physical Exam   Constitutional: He is oriented to person, place, and time. He appears well-developed and well-nourished. HENT:   Head: Normocephalic and atraumatic.    Cardiovascular: Norm

## 2018-02-06 ENCOUNTER — LAB ENCOUNTER (OUTPATIENT)
Dept: LAB | Facility: HOSPITAL | Age: 68
End: 2018-02-06
Attending: INTERNAL MEDICINE
Payer: MEDICARE

## 2018-02-06 DIAGNOSIS — E78.49 OTHER HYPERLIPIDEMIA: ICD-10-CM

## 2018-02-06 DIAGNOSIS — I10 ESSENTIAL HYPERTENSION: ICD-10-CM

## 2018-02-06 DIAGNOSIS — E11.65 UNCONTROLLED TYPE 2 DIABETES MELLITUS WITH HYPERGLYCEMIA, WITHOUT LONG-TERM CURRENT USE OF INSULIN (HCC): ICD-10-CM

## 2018-02-06 LAB
BASOPHILS # BLD AUTO: 0.05 X10(3) UL (ref 0–0.1)
BASOPHILS NFR BLD AUTO: 0.7 %
CHOLEST SMN-MCNC: 156 MG/DL (ref ?–200)
CREAT UR-SCNC: 122 MG/DL
EOSINOPHIL # BLD AUTO: 0.55 X10(3) UL (ref 0–0.3)
EOSINOPHIL NFR BLD AUTO: 7.7 %
ERYTHROCYTE [DISTWIDTH] IN BLOOD BY AUTOMATED COUNT: 12.2 % (ref 11.5–16)
HCT VFR BLD AUTO: 45 % (ref 37–53)
HDLC SERPL-MCNC: 40 MG/DL (ref 45–?)
HDLC SERPL: 3.9 {RATIO} (ref ?–4.97)
HGB BLD-MCNC: 14.7 G/DL (ref 13–17)
IMMATURE GRANULOCYTE COUNT: 0.01 X10(3) UL (ref 0–1)
IMMATURE GRANULOCYTE RATIO %: 0.1 %
LDLC SERPL CALC-MCNC: 89 MG/DL (ref ?–130)
LYMPHOCYTES # BLD AUTO: 2.31 X10(3) UL (ref 0.9–4)
LYMPHOCYTES NFR BLD AUTO: 32.2 %
MCH RBC QN AUTO: 29.8 PG (ref 27–33.2)
MCHC RBC AUTO-ENTMCNC: 32.7 G/DL (ref 31–37)
MCV RBC AUTO: 91.1 FL (ref 80–99)
MICROALBUMIN UR-MCNC: 1.03 MG/DL
MICROALBUMIN/CREAT 24H UR-RTO: 8.4 UG/MG (ref ?–30)
MONOCYTES # BLD AUTO: 0.63 X10(3) UL (ref 0.1–0.6)
MONOCYTES NFR BLD AUTO: 8.8 %
NEUTROPHIL ABS PRELIM: 3.63 X10 (3) UL (ref 1.3–6.7)
NEUTROPHILS # BLD AUTO: 3.63 X10(3) UL (ref 1.3–6.7)
NEUTROPHILS NFR BLD AUTO: 50.5 %
NONHDLC SERPL-MCNC: 116 MG/DL (ref ?–130)
PLATELET # BLD AUTO: 269 10(3)UL (ref 150–450)
RBC # BLD AUTO: 4.94 X10(6)UL (ref 3.8–5.8)
RED CELL DISTRIBUTION WIDTH-SD: 40.4 FL (ref 35.1–46.3)
TRIGL SERPL-MCNC: 137 MG/DL (ref ?–150)
VLDLC SERPL CALC-MCNC: 27 MG/DL (ref 5–40)
WBC # BLD AUTO: 7.2 X10(3) UL (ref 4–13)

## 2018-02-06 PROCEDURE — 85025 COMPLETE CBC W/AUTO DIFF WBC: CPT

## 2018-02-06 PROCEDURE — 36415 COLL VENOUS BLD VENIPUNCTURE: CPT

## 2018-02-06 PROCEDURE — 82570 ASSAY OF URINE CREATININE: CPT

## 2018-02-06 PROCEDURE — 80061 LIPID PANEL: CPT

## 2018-02-06 PROCEDURE — 82043 UR ALBUMIN QUANTITATIVE: CPT

## 2018-02-10 NOTE — TELEPHONE ENCOUNTER
LOV: 1/26/18  Future office visit: 2/16/18  Last labs: 2/6/18 Lipid Micro Cbc Cmp   Last RX: Memantine 9/19/17 #60 #6 Refills  Per protocol: Route to provider

## 2018-02-12 RX ORDER — DAPAGLIFLOZIN 10 MG/1
TABLET, FILM COATED ORAL
Qty: 30 TABLET | Refills: 0 | Status: SHIPPED | OUTPATIENT
Start: 2018-02-12 | End: 2018-03-11

## 2018-02-12 NOTE — TELEPHONE ENCOUNTER
This was prescribed by outside Dr., no dementia workup has been done by me, please have outside dr fill it or get me the dementia workup results

## 2018-02-14 PROBLEM — H40.1131 PRIMARY OPEN ANGLE GLAUCOMA OF BOTH EYES, MILD STAGE: Status: ACTIVE | Noted: 2017-07-10

## 2018-02-14 PROBLEM — E11.9 TYPE 2 DIABETES MELLITUS (HCC): Status: ACTIVE | Noted: 2017-03-16

## 2018-02-14 NOTE — TELEPHONE ENCOUNTER
[facility] called to check the status of refill. I let her know that we are waiting to hear back from the patient. She states she will let the patient know.

## 2018-02-16 ENCOUNTER — OFFICE VISIT (OUTPATIENT)
Dept: ENDOCRINOLOGY CLINIC | Facility: CLINIC | Age: 68
End: 2018-02-16

## 2018-02-16 VITALS
RESPIRATION RATE: 16 BRPM | WEIGHT: 174 LBS | DIASTOLIC BLOOD PRESSURE: 76 MMHG | TEMPERATURE: 98 F | BODY MASS INDEX: 25.77 KG/M2 | SYSTOLIC BLOOD PRESSURE: 120 MMHG | HEART RATE: 64 BPM | HEIGHT: 69 IN

## 2018-02-16 DIAGNOSIS — E11.65 TYPE 2 DIABETES MELLITUS WITH HYPERGLYCEMIA, WITHOUT LONG-TERM CURRENT USE OF INSULIN (HCC): Primary | ICD-10-CM

## 2018-02-16 DIAGNOSIS — R41.3 MEMORY CHANGE: ICD-10-CM

## 2018-02-16 PROCEDURE — 99214 OFFICE O/P EST MOD 30 MIN: CPT | Performed by: INTERNAL MEDICINE

## 2018-02-16 RX ORDER — MEMANTINE HYDROCHLORIDE 10 MG/1
TABLET ORAL
Qty: 60 TABLET | Refills: 4 | OUTPATIENT
Start: 2018-02-16

## 2018-02-16 RX ORDER — MEMANTINE HYDROCHLORIDE 10 MG/1
10 TABLET ORAL 2 TIMES DAILY
Qty: 60 TABLET | Refills: 2 | Status: SHIPPED | OUTPATIENT
Start: 2018-02-16 | End: 2018-05-16

## 2018-02-16 RX ORDER — NATEGLINIDE 60 MG/1
60 TABLET, COATED ORAL
Qty: 90 TABLET | Refills: 2 | Status: SHIPPED | OUTPATIENT
Start: 2018-02-16 | End: 2018-03-16

## 2018-02-16 NOTE — PROGRESS NOTES
Recommendation: may need something with meals    Wt/A1C: wt down 3#; A1C 7.3% 1/2/18    Diet: 3 meals, doesn't over-eat starch.  Feels his BG's are slightly improved since he switched from regular sugar to artificial sweetner in his coffee/tea 4-5 cups/day

## 2018-02-16 NOTE — PROGRESS NOTES
HPI:    Patient ID: Myke Izquierdo is a 78year old male.     HPI  Here for dm, high post prandial readings, takes memantine prescribed by his wife who is a psychologist who feels his memory was not optimal   /76 (BP Location: Right arm, Patient Posi by mouth. Disp:  Rfl:    aspirin 81 MG Oral Chew Tab Chew 81 mg by mouth daily. Disp:  Rfl:      Allergies:  Flu Virus Vaccine       Itching   PHYSICAL EXAM:   Physical Exam   Constitutional: He is oriented to person, place, and time.  He appears well-devel

## 2018-02-20 ENCOUNTER — OFFICE VISIT (OUTPATIENT)
Dept: SURGERY | Facility: CLINIC | Age: 68
End: 2018-02-20

## 2018-02-20 VITALS
SYSTOLIC BLOOD PRESSURE: 134 MMHG | WEIGHT: 178.81 LBS | DIASTOLIC BLOOD PRESSURE: 82 MMHG | HEIGHT: 69 IN | BODY MASS INDEX: 26.48 KG/M2 | HEART RATE: 74 BPM

## 2018-02-20 DIAGNOSIS — G47.33 OSA (OBSTRUCTIVE SLEEP APNEA): ICD-10-CM

## 2018-02-20 DIAGNOSIS — E11.65 TYPE 2 DIABETES MELLITUS WITH HYPERGLYCEMIA, WITHOUT LONG-TERM CURRENT USE OF INSULIN (HCC): ICD-10-CM

## 2018-02-20 DIAGNOSIS — Z80.9: Primary | ICD-10-CM

## 2018-02-20 PROCEDURE — 99213 OFFICE O/P EST LOW 20 MIN: CPT | Performed by: SURGERY

## 2018-02-20 RX ORDER — POLYETHYLENE GLYCOL 3350, SODIUM CHLORIDE, SODIUM BICARBONATE, POTASSIUM CHLORIDE 420; 11.2; 5.72; 1.48 G/4L; G/4L; G/4L; G/4L
POWDER, FOR SOLUTION ORAL
Qty: 1 BOTTLE | Refills: 0 | Status: SHIPPED | OUTPATIENT
Start: 2018-02-20 | End: 2018-04-10 | Stop reason: ALTCHOICE

## 2018-02-20 NOTE — H&P
New Patient Visit Note       Active Problems      1. Family history of malignant neoplasm in father    2. Type 2 diabetes mellitus with hyperglycemia, without long-term current use of insulin (HCC)    3.  BAKARI (obstructive sleep apnea)        Chief Complaint Concern  Caffeine Concern        Yes    Comment:3-4 cups coffee daily  Exercise                Yes         Current Outpatient Prescriptions:  PEG 3350-KCl-Na Bicarb-NaCl (TRILYTE) 420 g Oral Recon Soln Starting at 4:00 pm the night before procedure, d nosebleeds, sore throat and trouble swallowing. Respiratory: Negative for apnea, cough, shortness of breath and wheezing. Cardiovascular: Negative for chest pain, palpitations and leg swelling.    Gastrointestinal: Negative for abdominal distention, a patient and printed instructions provided. · All questions answered. I spent 30 minutes face to face with the patient. More than 50% of that time was spent counseling the patient and/or on coordination of care.  The diagnosis, prognosis, and general sony

## 2018-03-12 RX ORDER — DAPAGLIFLOZIN 10 MG/1
TABLET, FILM COATED ORAL
Qty: 30 TABLET | Refills: 0 | Status: SHIPPED | OUTPATIENT
Start: 2018-03-12 | End: 2018-04-08

## 2018-03-14 RX ORDER — ATORVASTATIN CALCIUM 10 MG/1
10 TABLET, FILM COATED ORAL DAILY
Qty: 90 TABLET | Refills: 1 | Status: SHIPPED | OUTPATIENT
Start: 2018-03-14 | End: 2018-09-13

## 2018-03-16 ENCOUNTER — OFFICE VISIT (OUTPATIENT)
Dept: ENDOCRINOLOGY CLINIC | Facility: CLINIC | Age: 68
End: 2018-03-16

## 2018-03-16 VITALS
HEART RATE: 64 BPM | BODY MASS INDEX: 26.96 KG/M2 | WEIGHT: 182 LBS | DIASTOLIC BLOOD PRESSURE: 70 MMHG | HEIGHT: 69 IN | RESPIRATION RATE: 16 BRPM | SYSTOLIC BLOOD PRESSURE: 130 MMHG

## 2018-03-16 DIAGNOSIS — N52.9 ERECTILE DYSFUNCTION, UNSPECIFIED ERECTILE DYSFUNCTION TYPE: ICD-10-CM

## 2018-03-16 DIAGNOSIS — E11.65 TYPE 2 DIABETES MELLITUS WITH HYPERGLYCEMIA, WITHOUT LONG-TERM CURRENT USE OF INSULIN (HCC): Primary | ICD-10-CM

## 2018-03-16 PROCEDURE — 99213 OFFICE O/P EST LOW 20 MIN: CPT | Performed by: INTERNAL MEDICINE

## 2018-03-16 RX ORDER — NATEGLINIDE 60 MG/1
TABLET, COATED ORAL
Qty: 30 TABLET | Refills: 2 | Status: SHIPPED | OUTPATIENT
Start: 2018-03-16 | End: 2018-07-28

## 2018-03-16 RX ORDER — NATEGLINIDE 60 MG/1
60 TABLET, COATED ORAL
Qty: 90 TABLET | Refills: 1 | Status: CANCELLED | OUTPATIENT
Start: 2018-03-16

## 2018-03-16 NOTE — PROGRESS NOTES
Changes made LOV: 2/16 added 60 mg starlix TID    A1C: 7.3% 1/2/18    Diet: reviewed, eats dinner 8pm, tries to eat light    Ex: same, does regular    DB Meds: Januvia 100 mg    Metformin 1,000 mg BID   Farxiga 10 mg   Starlix 60 mg TID to be reduced to on

## 2018-03-16 NOTE — PROGRESS NOTES
HPI:    Patient ID: Nicole Live is a 78year old male.     HPI  Here for dm, after adding starlix, actually on low side post breakfast and lunch now, only one post dinner ok at 160, ams on high side, co mild ed, refuses treatment now  /70 (BP Loca Chew Tab Chew 81 mg by mouth daily.  Disp:  Rfl:    Famciclovir (FAMVIR) 500 MG Oral Tab 3 tab at onset of symptoms Disp: 9 tablet Rfl: 1     Allergies:  Flu Virus Vaccine       Itching   PHYSICAL EXAM:   Physical Exam   Constitutional: He is oriented to pe

## 2018-03-16 NOTE — TELEPHONE ENCOUNTER
Called [facility] pharmacy to advise per JL one tablet daily before dinner.       I saw pt today, told to only take one tablet before dinner (Routing comment)

## 2018-03-16 NOTE — TELEPHONE ENCOUNTER
JL sent in rx today for nateglinide 60 MG Oral Tab and pharm called stating the dose he sent in is lower than the minimum daily dose-usually given for 3x per day not once-call to confirm

## 2018-04-09 RX ORDER — DAPAGLIFLOZIN 10 MG/1
TABLET, FILM COATED ORAL
Qty: 30 TABLET | Refills: 1 | Status: SHIPPED | OUTPATIENT
Start: 2018-04-09 | End: 2018-06-17

## 2018-04-10 NOTE — PROGRESS NOTES
HPI:    Patient ID: Rufus Oakley is a 78year old male. PCP: Dr Allen Sanz    HPI   I had the pleasure of seeing your patient for evaluation of memory loss. As you know, Rufus Oakley is a 78year old male with history of hypertension, diabetes, BAKARI. Psychiatric/Behavioral: Positive for decreased concentration. Negative for behavioral problems, hallucinations and sleep disturbance. The patient is not nervous/anxious. All other systems reviewed and are negative.            Current Outpatient Prescri developed, well nourished  HEENT: Normocephalic and atraumatic. Cardiovascular: Normal rate, regular rhythm and normal heart sounds. Pulmonary/Chest: Effort normal and breath sounds normal.   Abdominal: Soft.  Bowel sounds are normal.   Skin: Skin is w the defined types were placed in this encounter.       Meds This Visit:  No prescriptions requested or ordered in this encounter    Imaging & Referrals:  PSYCHOLOGY - INTERNAL  MRI BRAIN (W+WO) (CPT=70553)       [id number]

## 2018-04-10 NOTE — PATIENT INSTRUCTIONS
Refill policies:    • Allow 2-3 business days for refills; controlled substances may take longer.   • Contact your pharmacy at least 5 days prior to running out of medication and have them send an electronic request or submit request through the [facility] for the entire amount billed. Precertification and Prior Authorizations  If your physician has recommended that you have a procedure or additional testing performed.   [facility] ([facility]) will contact your insurance carrier to obtain pr

## 2018-04-10 NOTE — PROGRESS NOTES
Patient here to be evaluated for memory loss, more short term, also noticed he is repeating himself more than before. Patient stated wife is a psychiatrist and had patient on Namenda 10 mg BID.

## 2018-05-16 RX ORDER — MEMANTINE HYDROCHLORIDE 10 MG/1
TABLET ORAL
Qty: 60 TABLET | Refills: 4 | Status: SHIPPED | OUTPATIENT
Start: 2018-05-16 | End: 2018-10-01

## 2018-05-16 NOTE — TELEPHONE ENCOUNTER
LOV: 3/16/18 w/ KRYSTIAN  FOV: 7/13/18  Last labs: 2/6/18 LIPID,CBC,Microalbumin  Last Refill: 2/6/18 qt:60 2 refills    Per protocol sent to provider

## 2018-05-22 RX ORDER — SITAGLIPTIN 100 MG/1
TABLET, FILM COATED ORAL
Qty: 30 TABLET | Refills: 5 | Status: SHIPPED | OUTPATIENT
Start: 2018-05-22 | End: 2018-05-30

## 2018-05-30 ENCOUNTER — OFFICE VISIT (OUTPATIENT)
Dept: ENDOCRINOLOGY CLINIC | Facility: CLINIC | Age: 68
End: 2018-05-30

## 2018-05-30 VITALS
SYSTOLIC BLOOD PRESSURE: 130 MMHG | HEIGHT: 69 IN | BODY MASS INDEX: 27.4 KG/M2 | TEMPERATURE: 98 F | DIASTOLIC BLOOD PRESSURE: 70 MMHG | WEIGHT: 185 LBS | RESPIRATION RATE: 14 BRPM | HEART RATE: 68 BPM

## 2018-05-30 DIAGNOSIS — Z79.4 TYPE 2 DIABETES MELLITUS WITH HYPERGLYCEMIA, WITH LONG-TERM CURRENT USE OF INSULIN (HCC): Primary | ICD-10-CM

## 2018-05-30 DIAGNOSIS — E78.49 OTHER HYPERLIPIDEMIA: ICD-10-CM

## 2018-05-30 DIAGNOSIS — I10 ESSENTIAL HYPERTENSION: ICD-10-CM

## 2018-05-30 DIAGNOSIS — E11.65 TYPE 2 DIABETES MELLITUS WITH HYPERGLYCEMIA, WITH LONG-TERM CURRENT USE OF INSULIN (HCC): Primary | ICD-10-CM

## 2018-05-30 PROCEDURE — G0009 ADMIN PNEUMOCOCCAL VACCINE: HCPCS | Performed by: INTERNAL MEDICINE

## 2018-05-30 PROCEDURE — 90732 PPSV23 VACC 2 YRS+ SUBQ/IM: CPT | Performed by: INTERNAL MEDICINE

## 2018-05-30 PROCEDURE — 99213 OFFICE O/P EST LOW 20 MIN: CPT | Performed by: INTERNAL MEDICINE

## 2018-05-30 RX ORDER — LATANOPROST 50 UG/ML
SOLUTION/ DROPS OPHTHALMIC
Refills: 4 | COMMUNITY
Start: 2018-05-06 | End: 2018-10-29

## 2018-05-30 NOTE — PROGRESS NOTES
Recommendation: start basal insulin and reduce Januvia    Changes made LOV: 3/16/18 reduced starlix from TID to 60 mg pre-dinner as he feels low in 80's, 90's    A1C: 7.3% 1/2/18    Diet: if watches diet and exercise, FBS's ~130, if eats ice cream FBS ~150

## 2018-05-30 NOTE — PROGRESS NOTES
HPI:    Patient ID: Dominic Nicholas is a 80year old male.     HPI  Here high am sugars, post prandial ok, no lows now, rare vertigo  /70   Pulse 68   Temp 98 °F (36.7 °C) (Oral)   Resp 14   Ht 69\"   Wt 185 lb   BMI 27.32 kg/m²     Review of Systems mouth as needed. 3 tab at onset of symptoms ) Disp: 9 tablet Rfl: 1   Coenzyme Q10 (COQ10) 400 MG Oral Cap Take by mouth. Disp:  Rfl:    Nqnhjwqjj-Jrfdxctxa-Nwtgbzxxov (CEREFOLIN NAC) 6-2-600 MG Oral Tab Take 1 tablet by mouth daily.    Disp:  Rfl:    Omega

## 2018-06-04 ENCOUNTER — TELEPHONE (OUTPATIENT)
Dept: ENDOCRINOLOGY CLINIC | Facility: CLINIC | Age: 68
End: 2018-06-04

## 2018-06-04 RX ORDER — LISINOPRIL 10 MG/1
TABLET ORAL
Qty: 30 TABLET | Refills: 6 | Status: SHIPPED | OUTPATIENT
Start: 2018-06-04 | End: 2018-10-26

## 2018-06-05 NOTE — TELEPHONE ENCOUNTER
LOV 5/19 started pt on 8 units Levemir at HS. Pt has not started the insulin. He has reduced his bread by 50% and stopped eating sweets. FBS's now 114, 134 (ate late), 120's.   Advised him this may be acceptable pending your approval as long as his FBS's

## 2018-06-18 RX ORDER — DAPAGLIFLOZIN 10 MG/1
TABLET, FILM COATED ORAL
Qty: 90 TABLET | Refills: 0 | Status: SHIPPED | OUTPATIENT
Start: 2018-06-18 | End: 2018-09-14

## 2018-07-02 ENCOUNTER — OFFICE VISIT (OUTPATIENT)
Dept: SURGERY | Facility: CLINIC | Age: 68
End: 2018-07-02

## 2018-07-02 VITALS
SYSTOLIC BLOOD PRESSURE: 132 MMHG | BODY MASS INDEX: 27.4 KG/M2 | HEIGHT: 69 IN | DIASTOLIC BLOOD PRESSURE: 78 MMHG | HEART RATE: 75 BPM | TEMPERATURE: 98 F | WEIGHT: 185 LBS

## 2018-07-02 DIAGNOSIS — K40.91 RECURRENT LEFT INGUINAL HERNIA: Primary | ICD-10-CM

## 2018-07-02 DIAGNOSIS — E11.65 TYPE 2 DIABETES MELLITUS WITH HYPERGLYCEMIA, WITH LONG-TERM CURRENT USE OF INSULIN (HCC): ICD-10-CM

## 2018-07-02 DIAGNOSIS — G47.33 OSA (OBSTRUCTIVE SLEEP APNEA): ICD-10-CM

## 2018-07-02 DIAGNOSIS — Z79.4 TYPE 2 DIABETES MELLITUS WITH HYPERGLYCEMIA, WITH LONG-TERM CURRENT USE OF INSULIN (HCC): ICD-10-CM

## 2018-07-02 PROCEDURE — 99213 OFFICE O/P EST LOW 20 MIN: CPT | Performed by: SURGERY

## 2018-07-02 NOTE — PROGRESS NOTES
Follow Up Visit Note       Active Problems      1. Recurrent left inguinal hernia    2. Type 2 diabetes mellitus with hyperglycemia, with long-term current use of insulin (HCC)    3.  BAKARI (obstructive sleep apnea)          Chief Complaint   Patient presents Prescriptions:  FARXIGA 10 MG Oral Tab TAKE A TABLET BY MOUTH DAILY IN AM Disp: 90 tablet Rfl: 0   LISINOPRIL 10 MG Oral Tab TAKE 1 TABLET BY MOUTH EVERY DAY Disp: 30 tablet Rfl: 6   latanoprost 0.005 % Ophthalmic Solution INSTILL 1 DROP IN BOTH EYES NIGHT Constitutional: Negative for chills, diaphoresis, fatigue, fever and unexpected weight change. HENT: Negative for hearing loss, nosebleeds, sore throat and trouble swallowing. Respiratory: Negative for apnea, cough, shortness of breath and wheezing. a recurrent inguinal hernia discussed. · Plan for a recurrent open left inguinal hernia repair with mesh at the patient's convenience. · Procedure discussed with risks, benefits, alternatives.   · Risks include but are not exclusive to infection, bleeding

## 2018-07-07 NOTE — TELEPHONE ENCOUNTER
LOV:05/30/2018  Future Visit: 07/30/2018  Last Labs: 02/06/2018 Lipid panel, Micro, CBC   Last Rx: 04/02/2018    Per protocol sent to provider

## 2018-07-20 ENCOUNTER — TELEPHONE (OUTPATIENT)
Dept: INTERNAL MEDICINE CLINIC | Facility: CLINIC | Age: 68
End: 2018-07-20

## 2018-07-20 DIAGNOSIS — IMO0001 UNCONTROLLED TYPE 2 DIABETES MELLITUS WITHOUT COMPLICATION, WITHOUT LONG-TERM CURRENT USE OF INSULIN: ICD-10-CM

## 2018-07-20 DIAGNOSIS — I10 ESSENTIAL HYPERTENSION: Primary | ICD-10-CM

## 2018-07-20 DIAGNOSIS — E78.49 OTHER HYPERLIPIDEMIA: ICD-10-CM

## 2018-07-20 NOTE — TELEPHONE ENCOUNTER
Pt is planning on getting labs done tomorrow.   He is scheduled for a Medicare wellness Future Appointments  Date Time Provider Bart Guillermina   7/30/2018 9:45 AM Nikos Betancourt MD [facility]   8/1/2018 9:30 AM Nikos Betancourt MD [facility]

## 2018-07-21 ENCOUNTER — LAB ENCOUNTER (OUTPATIENT)
Dept: LAB | Facility: HOSPITAL | Age: 68
End: 2018-07-21
Attending: INTERNAL MEDICINE
Payer: MEDICARE

## 2018-07-21 DIAGNOSIS — E78.49 OTHER HYPERLIPIDEMIA: ICD-10-CM

## 2018-07-21 DIAGNOSIS — N52.9 ERECTILE DYSFUNCTION, UNSPECIFIED ERECTILE DYSFUNCTION TYPE: ICD-10-CM

## 2018-07-21 DIAGNOSIS — K40.91 RECURRENT LEFT INGUINAL HERNIA: ICD-10-CM

## 2018-07-21 DIAGNOSIS — IMO0001 UNCONTROLLED TYPE 2 DIABETES MELLITUS WITHOUT COMPLICATION, WITHOUT LONG-TERM CURRENT USE OF INSULIN: ICD-10-CM

## 2018-07-21 DIAGNOSIS — I10 ESSENTIAL HYPERTENSION: ICD-10-CM

## 2018-07-21 LAB
ALBUMIN SERPL-MCNC: 3.9 G/DL (ref 3.5–4.8)
ALBUMIN/GLOB SERPL: 1 {RATIO} (ref 1–2)
ALP LIVER SERPL-CCNC: 27 U/L (ref 45–117)
ALT SERPL-CCNC: 37 U/L (ref 17–63)
ANION GAP SERPL CALC-SCNC: 5 MMOL/L (ref 0–18)
AST SERPL-CCNC: 11 U/L (ref 15–41)
BASOPHILS # BLD AUTO: 0.04 X10(3) UL (ref 0–0.1)
BASOPHILS NFR BLD AUTO: 0.6 %
BILIRUB SERPL-MCNC: 0.5 MG/DL (ref 0.1–2)
BUN BLD-MCNC: 14 MG/DL (ref 8–20)
BUN/CREAT SERPL: 18.2 (ref 10–20)
CALCIUM BLD-MCNC: 9.1 MG/DL (ref 8.3–10.3)
CHLORIDE SERPL-SCNC: 106 MMOL/L (ref 101–111)
CHOLEST SMN-MCNC: 169 MG/DL (ref ?–200)
CO2 SERPL-SCNC: 29 MMOL/L (ref 22–32)
CREAT BLD-MCNC: 0.77 MG/DL (ref 0.7–1.3)
EOSINOPHIL # BLD AUTO: 0.12 X10(3) UL (ref 0–0.3)
EOSINOPHIL NFR BLD AUTO: 1.8 %
ERYTHROCYTE [DISTWIDTH] IN BLOOD BY AUTOMATED COUNT: 13.4 % (ref 11.5–16)
EST. AVERAGE GLUCOSE BLD GHB EST-MCNC: 140 MG/DL (ref 68–126)
GLOBULIN PLAS-MCNC: 4.1 G/DL (ref 2.5–3.7)
GLUCOSE BLD-MCNC: 125 MG/DL (ref 70–99)
HBA1C MFR BLD HPLC: 6.5 % (ref ?–5.7)
HCT VFR BLD AUTO: 45.6 % (ref 37–53)
HDLC SERPL-MCNC: 47 MG/DL (ref 45–?)
HDLC SERPL: 3.6 {RATIO} (ref ?–4.97)
HGB BLD-MCNC: 15.1 G/DL (ref 13–17)
IMMATURE GRANULOCYTE COUNT: 0.02 X10(3) UL (ref 0–1)
IMMATURE GRANULOCYTE RATIO %: 0.3 %
LDLC SERPL CALC-MCNC: 93 MG/DL (ref ?–130)
LYMPHOCYTES # BLD AUTO: 2.44 X10(3) UL (ref 0.9–4)
LYMPHOCYTES NFR BLD AUTO: 36.7 %
M PROTEIN MFR SERPL ELPH: 8 G/DL (ref 6.1–8.3)
MCH RBC QN AUTO: 30.3 PG (ref 27–33.2)
MCHC RBC AUTO-ENTMCNC: 33.1 G/DL (ref 31–37)
MCV RBC AUTO: 91.4 FL (ref 80–99)
MONOCYTES # BLD AUTO: 0.63 X10(3) UL (ref 0.1–1)
MONOCYTES NFR BLD AUTO: 9.5 %
NEUTROPHIL ABS PRELIM: 3.4 X10 (3) UL (ref 1.3–6.7)
NEUTROPHILS # BLD AUTO: 3.4 X10(3) UL (ref 1.3–6.7)
NEUTROPHILS NFR BLD AUTO: 51.1 %
NONHDLC SERPL-MCNC: 122 MG/DL (ref ?–130)
OSMOLALITY SERPL CALC.SUM OF ELEC: 292 MOSM/KG (ref 275–295)
PLATELET # BLD AUTO: 278 10(3)UL (ref 150–450)
POTASSIUM SERPL-SCNC: 3.9 MMOL/L (ref 3.6–5.1)
RBC # BLD AUTO: 4.99 X10(6)UL (ref 3.8–5.8)
RED CELL DISTRIBUTION WIDTH-SD: 45.1 FL (ref 35.1–46.3)
SODIUM SERPL-SCNC: 140 MMOL/L (ref 136–144)
TESTOST SERPL-MCNC: 478.1 NG/DL (ref 241–827)
TRIGL SERPL-MCNC: 146 MG/DL (ref ?–150)
VLDLC SERPL CALC-MCNC: 29 MG/DL (ref 5–40)
WBC # BLD AUTO: 6.7 X10(3) UL (ref 4–13)

## 2018-07-21 PROCEDURE — 84403 ASSAY OF TOTAL TESTOSTERONE: CPT

## 2018-07-21 PROCEDURE — 83036 HEMOGLOBIN GLYCOSYLATED A1C: CPT

## 2018-07-21 PROCEDURE — 80053 COMPREHEN METABOLIC PANEL: CPT

## 2018-07-21 PROCEDURE — 80061 LIPID PANEL: CPT

## 2018-07-21 PROCEDURE — 36415 COLL VENOUS BLD VENIPUNCTURE: CPT

## 2018-07-21 PROCEDURE — 85025 COMPLETE CBC W/AUTO DIFF WBC: CPT

## 2018-07-25 ENCOUNTER — EKG ENCOUNTER (OUTPATIENT)
Dept: LAB | Facility: HOSPITAL | Age: 68
End: 2018-07-25
Attending: SURGERY
Payer: MEDICARE

## 2018-07-25 PROCEDURE — 93010 ELECTROCARDIOGRAM REPORT: CPT | Performed by: INTERNAL MEDICINE

## 2018-07-25 PROCEDURE — 93005 ELECTROCARDIOGRAM TRACING: CPT

## 2018-07-27 LAB
ATRIAL RATE: 74 BPM
P AXIS: 32 DEGREES
P-R INTERVAL: 152 MS
Q-T INTERVAL: 390 MS
QRS DURATION: 84 MS
QTC CALCULATION (BEZET): 432 MS
R AXIS: 10 DEGREES
T AXIS: 58 DEGREES
VENTRICULAR RATE: 74 BPM

## 2018-07-30 RX ORDER — NATEGLINIDE 60 MG/1
TABLET, COATED ORAL
Qty: 30 TABLET | Refills: 2 | Status: SHIPPED | OUTPATIENT
Start: 2018-07-30 | End: 2018-10-28

## 2018-08-15 ENCOUNTER — OFFICE VISIT (OUTPATIENT)
Dept: SURGERY | Facility: CLINIC | Age: 68
End: 2018-08-15

## 2018-08-15 VITALS
TEMPERATURE: 97 F | BODY MASS INDEX: 27 KG/M2 | SYSTOLIC BLOOD PRESSURE: 124 MMHG | HEART RATE: 82 BPM | DIASTOLIC BLOOD PRESSURE: 78 MMHG | WEIGHT: 185 LBS

## 2018-08-15 DIAGNOSIS — K40.91 RECURRENT LEFT INGUINAL HERNIA: Primary | ICD-10-CM

## 2018-08-15 PROCEDURE — 99024 POSTOP FOLLOW-UP VISIT: CPT | Performed by: PHYSICIAN ASSISTANT

## 2018-08-15 NOTE — PROGRESS NOTES
Post Operative Visit Note       Active Problems  1.  Recurrent left inguinal hernia         Chief Complaint   Patient presents with:  Post-Op: 8/2 L inguinal hernia repair done at [facility] Dr. Rausch [place]     History of Present Illness   The patient is a pleasant 80- Smokeless tobacco: Never Used                        Alcohol use: No              Drug use:  No              Sexual activity: Yes                  Other Topics            Concern  Caffeine Concern        Yes    Comment:3-4 cups coffee daily  Exercise me during today. Review of Systems   Constitutional: Negative for chills, diaphoresis, fatigue, fever and unexpected weight change. HENT: Negative for hearing loss, nosebleeds, sore throat and trouble swallowing.     Respiratory: Negative for apnea, coug No rash noted. He is not diaphoretic. No erythema. Psychiatric: He has a normal mood and affect. His behavior is normal. Judgment and thought content normal.   Nursing note and vitals reviewed.           Assessment   Recurrent left inguinal hernia  (prima

## 2018-09-13 RX ORDER — ATORVASTATIN CALCIUM 10 MG/1
10 TABLET, FILM COATED ORAL DAILY
Qty: 90 TABLET | Refills: 1 | Status: SHIPPED | OUTPATIENT
Start: 2018-09-13 | End: 2019-03-08

## 2018-09-14 RX ORDER — DAPAGLIFLOZIN 10 MG/1
TABLET, FILM COATED ORAL
Qty: 90 TABLET | Refills: 0 | Status: SHIPPED | OUTPATIENT
Start: 2018-09-14 | End: 2018-09-19

## 2018-09-20 RX ORDER — DAPAGLIFLOZIN 10 MG/1
TABLET, FILM COATED ORAL
Qty: 90 TABLET | Refills: 0 | Status: SHIPPED | OUTPATIENT
Start: 2018-09-20 | End: 2019-03-22

## 2018-10-01 RX ORDER — MEMANTINE HYDROCHLORIDE 10 MG/1
TABLET ORAL
Qty: 60 TABLET | Refills: 4 | Status: SHIPPED | OUTPATIENT
Start: 2018-10-01 | End: 2019-01-25

## 2018-10-01 NOTE — TELEPHONE ENCOUNTER
LOV-5/30/18 JL  FOV-10/29/18 JL  LAST RX-5/16/18 60 tabs 4 refills  LAST LABS-7/21/18  PER PROTOCOL-to provider

## 2018-10-26 RX ORDER — LISINOPRIL 10 MG/1
10 TABLET ORAL
Qty: 30 TABLET | Refills: 6 | Status: SHIPPED | OUTPATIENT
Start: 2018-10-26 | End: 2019-04-25

## 2018-10-29 ENCOUNTER — OFFICE VISIT (OUTPATIENT)
Dept: INTERNAL MEDICINE CLINIC | Facility: CLINIC | Age: 68
End: 2018-10-29
Payer: MEDICARE

## 2018-10-29 VITALS
DIASTOLIC BLOOD PRESSURE: 80 MMHG | HEIGHT: 69 IN | BODY MASS INDEX: 27.11 KG/M2 | SYSTOLIC BLOOD PRESSURE: 126 MMHG | HEART RATE: 84 BPM | TEMPERATURE: 98 F | WEIGHT: 183 LBS | RESPIRATION RATE: 20 BRPM

## 2018-10-29 DIAGNOSIS — G47.33 OSA (OBSTRUCTIVE SLEEP APNEA): ICD-10-CM

## 2018-10-29 DIAGNOSIS — I10 ESSENTIAL HYPERTENSION: ICD-10-CM

## 2018-10-29 DIAGNOSIS — M25.511 CHRONIC RIGHT SHOULDER PAIN: Primary | ICD-10-CM

## 2018-10-29 DIAGNOSIS — N40.1 BENIGN PROSTATIC HYPERPLASIA WITH URINARY OBSTRUCTION: ICD-10-CM

## 2018-10-29 DIAGNOSIS — E78.49 OTHER HYPERLIPIDEMIA: ICD-10-CM

## 2018-10-29 DIAGNOSIS — E11.65 TYPE 2 DIABETES MELLITUS WITH HYPERGLYCEMIA, WITHOUT LONG-TERM CURRENT USE OF INSULIN (HCC): ICD-10-CM

## 2018-10-29 DIAGNOSIS — Z00.00 ROUTINE GENERAL MEDICAL EXAMINATION AT A HEALTH CARE FACILITY: ICD-10-CM

## 2018-10-29 DIAGNOSIS — N13.8 BENIGN PROSTATIC HYPERPLASIA WITH URINARY OBSTRUCTION: ICD-10-CM

## 2018-10-29 DIAGNOSIS — G89.29 CHRONIC RIGHT SHOULDER PAIN: Primary | ICD-10-CM

## 2018-10-29 PROCEDURE — 99213 OFFICE O/P EST LOW 20 MIN: CPT | Performed by: INTERNAL MEDICINE

## 2018-10-29 PROCEDURE — G0438 PPPS, INITIAL VISIT: HCPCS | Performed by: INTERNAL MEDICINE

## 2018-10-29 NOTE — PROGRESS NOTES
HPI:    Patient ID: Yamil Coronado is a 80year old male.     HPI  Here for awv, pt never took insulin, doesn't have sugars with him, has not recoreded in last 2 months, htn, hyperchol, dm, nocturia x1, abdirashid uses cpap, chronic right shoulder pain for months MG Oral Tab Take 1 tablet by mouth daily. Disp:  Rfl:    Omega-3 Fatty Acids (FISH OIL) 600 MG Oral Cap Take  by mouth. Disp:  Rfl:    aspirin 81 MG Oral Chew Tab Chew 81 mg by mouth daily.  Disp:  Rfl:    ONETOUCH ULTRA BLUE In Vitro Strip USE AS [employer] months    No orders of the defined types were placed in this encounter.       Meds This Visit:  Requested Prescriptions      No prescriptions requested or ordered in this encounter       Imaging & Referrals:  OP REFERRAL TO Cheryl Lyles

## 2018-10-30 PROBLEM — Z00.00 ROUTINE GENERAL MEDICAL EXAMINATION AT A HEALTH CARE FACILITY: Status: ACTIVE | Noted: 2018-10-30

## 2018-10-30 RX ORDER — NATEGLINIDE 60 MG/1
TABLET, COATED ORAL
Qty: 30 TABLET | Refills: 2 | Status: SHIPPED | OUTPATIENT
Start: 2018-10-30 | End: 2019-01-25

## 2018-11-24 RX ORDER — SITAGLIPTIN 100 MG/1
TABLET, FILM COATED ORAL
Qty: 90 TABLET | Refills: 1 | Status: SHIPPED | OUTPATIENT
Start: 2018-11-24 | End: 2019-05-18

## 2018-12-28 ENCOUNTER — OFFICE VISIT (OUTPATIENT)
Dept: ENDOCRINOLOGY CLINIC | Facility: CLINIC | Age: 68
End: 2018-12-28
Payer: MEDICARE

## 2018-12-28 VITALS
BODY MASS INDEX: 26.92 KG/M2 | WEIGHT: 188 LBS | HEART RATE: 80 BPM | HEIGHT: 70 IN | RESPIRATION RATE: 20 BRPM | SYSTOLIC BLOOD PRESSURE: 136 MMHG | DIASTOLIC BLOOD PRESSURE: 72 MMHG | TEMPERATURE: 98 F

## 2018-12-28 DIAGNOSIS — Z12.5 PROSTATE CANCER SCREENING: ICD-10-CM

## 2018-12-28 DIAGNOSIS — E78.49 OTHER HYPERLIPIDEMIA: ICD-10-CM

## 2018-12-28 DIAGNOSIS — I10 ESSENTIAL HYPERTENSION: Primary | ICD-10-CM

## 2018-12-28 DIAGNOSIS — E11.65 TYPE 2 DIABETES MELLITUS WITH HYPERGLYCEMIA, WITHOUT LONG-TERM CURRENT USE OF INSULIN (HCC): ICD-10-CM

## 2018-12-28 PROCEDURE — 99213 OFFICE O/P EST LOW 20 MIN: CPT | Performed by: INTERNAL MEDICINE

## 2019-01-02 NOTE — PROGRESS NOTES
HPI:    Patient ID: Carol Choe is a 80year old male.     HPI  Here for dm, sugars ok, discussed goals  /72   Pulse 80   Temp 97.9 °F (36.6 °C) (Oral)   Resp 20   Ht 70\"   Wt 188 lb   BMI 26.98 kg/m²     Review of Systems   Constitutional: Yin Lance well-developed and well-nourished. HENT:   Head: Normocephalic and atraumatic. Eyes: Pupils are equal, round, and reactive to light. Cardiovascular: Normal rate, regular rhythm and normal heart sounds. No murmur heard.   Pulmonary/Chest: Effort norm

## 2019-01-21 RX ORDER — MEMANTINE HYDROCHLORIDE 10 MG/1
10 TABLET ORAL 2 TIMES DAILY
Qty: 180 TABLET | Refills: 1 | OUTPATIENT
Start: 2019-01-21

## 2019-01-21 NOTE — TELEPHONE ENCOUNTER
LOV: 12/28/18 w/ KRYSTIAN  FOV: None  Last labs: 7/21/18 A1C,LIPID,CMP,CBC,Testosterone  Last Refill: 10/1/18 qt:60 4 refills    Per protocol sent to provider

## 2019-01-22 NOTE — TELEPHONE ENCOUNTER
Left detailed message ok per hipaa to notify pt he is to get Memantine refill from Neurologist since they are managing this medication.

## 2019-01-25 RX ORDER — MEMANTINE HYDROCHLORIDE 10 MG/1
TABLET ORAL
Qty: 60 TABLET | Refills: 0 | Status: SHIPPED | OUTPATIENT
Start: 2019-01-25

## 2019-01-25 RX ORDER — NATEGLINIDE 60 MG/1
TABLET, COATED ORAL
Qty: 30 TABLET | Refills: 2 | Status: SHIPPED | OUTPATIENT
Start: 2019-01-25 | End: 2019-04-19

## 2019-01-25 NOTE — TELEPHONE ENCOUNTER
LOV-12/28/18 JL  FOV-3/4/19CAB  LAST RX-10/1/18 30 tabs 2 refill  LAST LABS-7/21/18  PER PROTOCOL-to provider

## 2019-02-26 NOTE — TELEPHONE ENCOUNTER
Pt has only seen Dr. Casper Hoffman once, and did not follow up as recommended or have recommended testing completed. PCP has been filling Medication, but has instructed patient to have filled by neurology. Pt requires appt before med can be filled.       Spoke

## 2019-02-28 ENCOUNTER — LAB ENCOUNTER (OUTPATIENT)
Dept: LAB | Facility: HOSPITAL | Age: 69
End: 2019-02-28
Attending: INTERNAL MEDICINE
Payer: MEDICARE

## 2019-02-28 DIAGNOSIS — Z12.5 PROSTATE CANCER SCREENING: ICD-10-CM

## 2019-02-28 DIAGNOSIS — E11.65 TYPE 2 DIABETES MELLITUS WITH HYPERGLYCEMIA, WITHOUT LONG-TERM CURRENT USE OF INSULIN (HCC): ICD-10-CM

## 2019-02-28 LAB
ALBUMIN SERPL-MCNC: 3.7 G/DL (ref 3.4–5)
ALBUMIN/GLOB SERPL: 0.9 {RATIO} (ref 1–2)
ALP LIVER SERPL-CCNC: 26 U/L (ref 45–117)
ALT SERPL-CCNC: 35 U/L (ref 16–61)
ANION GAP SERPL CALC-SCNC: 8 MMOL/L (ref 0–18)
AST SERPL-CCNC: 13 U/L (ref 15–37)
BASOPHILS # BLD AUTO: 0.05 X10(3) UL (ref 0–0.2)
BASOPHILS NFR BLD AUTO: 0.6 %
BILIRUB SERPL-MCNC: 0.4 MG/DL (ref 0.1–2)
BUN BLD-MCNC: 15 MG/DL (ref 7–18)
BUN/CREAT SERPL: 16.7 (ref 10–20)
CALCIUM BLD-MCNC: 9.2 MG/DL (ref 8.5–10.1)
CHLORIDE SERPL-SCNC: 103 MMOL/L (ref 98–107)
CHOLEST SMN-MCNC: 170 MG/DL (ref ?–200)
CO2 SERPL-SCNC: 29 MMOL/L (ref 21–32)
COMPLEXED PSA SERPL-MCNC: 1.39 NG/ML (ref ?–4)
CREAT BLD-MCNC: 0.9 MG/DL (ref 0.7–1.3)
CREAT UR-SCNC: 13.3 MG/DL
DEPRECATED RDW RBC AUTO: 41.1 FL (ref 35.1–46.3)
EOSINOPHIL # BLD AUTO: 0.17 X10(3) UL (ref 0–0.7)
EOSINOPHIL NFR BLD AUTO: 2.1 %
ERYTHROCYTE [DISTWIDTH] IN BLOOD BY AUTOMATED COUNT: 12.5 % (ref 11–15)
GLOBULIN PLAS-MCNC: 4.2 G/DL (ref 2.8–4.4)
GLUCOSE BLD-MCNC: 142 MG/DL (ref 70–99)
HCT VFR BLD AUTO: 46.4 % (ref 39–53)
HDLC SERPL-MCNC: 42 MG/DL (ref 40–59)
HGB BLD-MCNC: 15.5 G/DL (ref 13–17.5)
IMM GRANULOCYTES # BLD AUTO: 0.02 X10(3) UL (ref 0–1)
IMM GRANULOCYTES NFR BLD: 0.3 %
LDLC SERPL CALC-MCNC: 101 MG/DL (ref ?–100)
LYMPHOCYTES # BLD AUTO: 2.4 X10(3) UL (ref 1–4)
LYMPHOCYTES NFR BLD AUTO: 30.1 %
M PROTEIN MFR SERPL ELPH: 7.9 G/DL (ref 6.4–8.2)
MCH RBC QN AUTO: 30.1 PG (ref 26–34)
MCHC RBC AUTO-ENTMCNC: 33.4 G/DL (ref 31–37)
MCV RBC AUTO: 90.1 FL (ref 80–100)
MICROALBUMIN UR-MCNC: <0.5 MG/DL
MONOCYTES # BLD AUTO: 0.76 X10(3) UL (ref 0.1–1)
MONOCYTES NFR BLD AUTO: 9.5 %
NEUTROPHILS # BLD AUTO: 4.57 X10 (3) UL (ref 1.5–7.7)
NEUTROPHILS # BLD AUTO: 4.57 X10(3) UL (ref 1.5–7.7)
NEUTROPHILS NFR BLD AUTO: 57.4 %
NONHDLC SERPL-MCNC: 128 MG/DL (ref ?–130)
OSMOLALITY SERPL CALC.SUM OF ELEC: 293 MOSM/KG (ref 275–295)
PLATELET # BLD AUTO: 276 10(3)UL (ref 150–450)
POTASSIUM SERPL-SCNC: 4.3 MMOL/L (ref 3.5–5.1)
RBC # BLD AUTO: 5.15 X10(6)UL (ref 3.8–5.8)
SODIUM SERPL-SCNC: 140 MMOL/L (ref 136–145)
TRIGL SERPL-MCNC: 135 MG/DL (ref 30–149)
VLDLC SERPL CALC-MCNC: 27 MG/DL (ref 0–30)
WBC # BLD AUTO: 8 X10(3) UL (ref 4–11)

## 2019-02-28 PROCEDURE — 36415 COLL VENOUS BLD VENIPUNCTURE: CPT

## 2019-02-28 PROCEDURE — 82043 UR ALBUMIN QUANTITATIVE: CPT

## 2019-02-28 PROCEDURE — 82570 ASSAY OF URINE CREATININE: CPT

## 2019-02-28 PROCEDURE — 85025 COMPLETE CBC W/AUTO DIFF WBC: CPT

## 2019-02-28 PROCEDURE — 80053 COMPREHEN METABOLIC PANEL: CPT

## 2019-02-28 PROCEDURE — 80061 LIPID PANEL: CPT

## 2019-03-09 RX ORDER — ATORVASTATIN CALCIUM 10 MG/1
TABLET, FILM COATED ORAL
Qty: 90 TABLET | Refills: 0 | Status: SHIPPED | OUTPATIENT
Start: 2019-03-09 | End: 2019-06-06

## 2019-03-09 NOTE — TELEPHONE ENCOUNTER
Last Office Visit: 12-28-18 with KRYSTIAN for diabetes  Last Rx Filled: 9-13-18 180 tabs with 1 refill   Last Labs: 2-28-19 psa/cbc/lipid/cmp/urine microalbumin  Future Appointment: 4-1-19 with Jose Manuel Arias    Per protocol to provider

## 2019-03-25 RX ORDER — DAPAGLIFLOZIN 10 MG/1
TABLET, FILM COATED ORAL
Qty: 90 TABLET | Refills: 0 | Status: SHIPPED | OUTPATIENT
Start: 2019-03-25 | End: 2019-06-20

## 2019-03-25 NOTE — TELEPHONE ENCOUNTER
LOV-12/28/189 JL  FOV-4/1/19 cAB  LAST RX-9/20/18 90 tabs 0 refill  LAST LABS-7/21/18 a1c-6.5  PER PROTOCOL-to provider

## 2019-04-01 ENCOUNTER — OFFICE VISIT (OUTPATIENT)
Dept: ENDOCRINOLOGY CLINIC | Facility: CLINIC | Age: 69
End: 2019-04-01
Payer: MEDICARE

## 2019-04-01 VITALS
HEART RATE: 72 BPM | BODY MASS INDEX: 26.77 KG/M2 | TEMPERATURE: 98 F | RESPIRATION RATE: 20 BRPM | WEIGHT: 187 LBS | SYSTOLIC BLOOD PRESSURE: 126 MMHG | DIASTOLIC BLOOD PRESSURE: 62 MMHG | HEIGHT: 70 IN

## 2019-04-01 DIAGNOSIS — E11.9 TYPE 2 DIABETES MELLITUS WITHOUT COMPLICATION, WITHOUT LONG-TERM CURRENT USE OF INSULIN (HCC): ICD-10-CM

## 2019-04-01 DIAGNOSIS — E11.65 TYPE 2 DIABETES MELLITUS WITH HYPERGLYCEMIA, WITHOUT LONG-TERM CURRENT USE OF INSULIN (HCC): Primary | ICD-10-CM

## 2019-04-01 PROCEDURE — 83036 HEMOGLOBIN GLYCOSYLATED A1C: CPT | Performed by: NURSE PRACTITIONER

## 2019-04-01 PROCEDURE — 99214 OFFICE O/P EST MOD 30 MIN: CPT | Performed by: NURSE PRACTITIONER

## 2019-04-01 NOTE — PROGRESS NOTES
Patient presents with:  Diabetes: room-17 cf. pt forgot meter. HPI:   María Hicks is a 80year old male presenting for type 2 DM medication management.  In the past 3m, DM control has increased slightly: 6.9%  (7-2018 A1C was 6.5%)   Admits he has hard stephanie no  Stroke/CVA: no    Modifying factors:  Medication adherence: forgets to take Starlix pre dinner, taking at bedtime   Nutrition  Recall of diet not heavy in carb    Exercise: 30-45 min 5 day /week   Recent steroids, illness or infections: no     Allergie Ophthalmic Solution  Disp:  Rfl:    Calcium Carbonate Antacid 600 MG Oral Chew Tab Chew 600 mg by mouth as needed. Disp:  Rfl:    Coenzyme Q10 (COQ10) 400 MG Oral Cap Take by mouth.  Disp:  Rfl:    Lutjqhbac-Dszfgiwig-Tbixlgqtui (CEREFOLIN NAC) 6-2-600 MG warm and dry.        Assessment/Plan:  Type 2 diabetes mellitus without complication, without long-term current use of insulin ([facility])  A1C :   Lab Results   Component Value Date     (H) 07/21/2018    A1C 6.9 (A) 04/01/2019   up from 6.5%   Weight: 187

## 2019-04-01 NOTE — PATIENT INSTRUCTIONS
Your A1C: 6.9%   Average glucose for the past 3 m : 145 mg/dl    The main goal of diabetes treatment is to keep your sugar from going too high.  We measure your overall blood sugar trends with a Hemoglobin A1C test. (also called an A1C)  For most people the blood sugars < 75 or > 200.    Blood sugars greater than 200 are not acceptable to reach your goal of improving diabetes

## 2019-04-01 NOTE — ASSESSMENT & PLAN NOTE
A1C :   Lab Results   Component Value Date     (H) 07/21/2018    A1C 6.9 (A) 04/01/2019   up from 6.5%   Weight: 187 lb   Diabetes control is good.   Recommendations: reminded to take Nateglinide 60mg WITH dinner for efficacy   Continue:   Januvia 10

## 2019-04-05 ENCOUNTER — TELEPHONE (OUTPATIENT)
Dept: INTERNAL MEDICINE CLINIC | Facility: CLINIC | Age: 69
End: 2019-04-05

## 2019-04-06 ENCOUNTER — TELEPHONE (OUTPATIENT)
Dept: ENDOCRINOLOGY CLINIC | Facility: CLINIC | Age: 69
End: 2019-04-06

## 2019-04-06 DIAGNOSIS — E11.9 TYPE 2 DIABETES MELLITUS WITHOUT COMPLICATION, WITHOUT LONG-TERM CURRENT USE OF INSULIN (HCC): Primary | ICD-10-CM

## 2019-04-06 NOTE — TELEPHONE ENCOUNTER
Received request for alternative to 2 x /day testing with One Touch Ultra Blue test strips. Stated that per Medicare regulation, pt can only get Rx for once / day. Sent alternative Rx to [facility] pharmacy.

## 2019-04-09 NOTE — TELEPHONE ENCOUNTER
Order changed to once/day testing. Called pt and informed him of same and to vary between fasting and 1-2 hours post-prandial. Pt verbalized understanding.

## 2019-04-09 NOTE — TELEPHONE ENCOUNTER
Pt can test 1 x daily   Vary from fasting to post prandial checks to monitor trends  Recommend testing 1- 2 hr after largest meal on days he is testing after meals.

## 2019-04-09 NOTE — TELEPHONE ENCOUNTER
Received fax from [phone] that BID BG testing not allowed for pt since no longer on insulin. Please advise.

## 2019-04-19 RX ORDER — NATEGLINIDE 60 MG/1
TABLET, COATED ORAL
Qty: 30 TABLET | Refills: 2 | Status: SHIPPED | OUTPATIENT
Start: 2019-04-19 | End: 2019-06-13

## 2019-04-25 RX ORDER — LISINOPRIL 10 MG/1
TABLET ORAL
Qty: 30 TABLET | Refills: 2 | Status: SHIPPED | OUTPATIENT
Start: 2019-04-25 | End: 2019-06-20

## 2019-05-20 RX ORDER — SITAGLIPTIN 100 MG/1
TABLET, FILM COATED ORAL
Qty: 90 TABLET | Refills: 1 | Status: SHIPPED | OUTPATIENT
Start: 2019-05-20 | End: 2019-11-11

## 2019-06-06 RX ORDER — ATORVASTATIN CALCIUM 10 MG/1
TABLET, FILM COATED ORAL
Qty: 90 TABLET | Refills: 0 | Status: SHIPPED | OUTPATIENT
Start: 2019-06-06 | End: 2019-08-29

## 2019-06-13 RX ORDER — NATEGLINIDE 60 MG/1
TABLET, COATED ORAL
Qty: 30 TABLET | Refills: 1 | Status: SHIPPED | OUTPATIENT
Start: 2019-06-13 | End: 2019-07-06

## 2019-06-20 RX ORDER — DAPAGLIFLOZIN 10 MG/1
TABLET, FILM COATED ORAL
Qty: 90 TABLET | Refills: 0 | Status: SHIPPED | OUTPATIENT
Start: 2019-06-20 | End: 2019-09-26

## 2019-06-20 RX ORDER — LISINOPRIL 10 MG/1
TABLET ORAL
Qty: 90 TABLET | Refills: 0 | Status: SHIPPED | OUTPATIENT
Start: 2019-06-20 | End: 2019-09-16

## 2019-07-03 ENCOUNTER — TELEPHONE (OUTPATIENT)
Dept: ENDOCRINOLOGY CLINIC | Facility: CLINIC | Age: 69
End: 2019-07-03

## 2019-07-03 DIAGNOSIS — E11.9 TYPE 2 DIABETES MELLITUS WITHOUT COMPLICATION, WITHOUT LONG-TERM CURRENT USE OF INSULIN (HCC): Primary | ICD-10-CM

## 2019-07-03 RX ORDER — BLOOD SUGAR DIAGNOSTIC
1 STRIP MISCELLANEOUS 2 TIMES DAILY
Qty: 100 STRIP | Refills: 1 | Status: SHIPPED | OUTPATIENT
Start: 2019-07-03

## 2019-07-03 RX ORDER — LANCETS
1 EACH MISCELLANEOUS 2 TIMES DAILY
Qty: 1 BOX | Refills: 1 | Status: SHIPPED | OUTPATIENT
Start: 2019-07-03 | End: 2020-07-02

## 2019-07-03 RX ORDER — BLOOD-GLUCOSE METER
1 EACH MISCELLANEOUS ONCE
Qty: 1 KIT | Refills: 0 | Status: SHIPPED | OUTPATIENT
Start: 2019-07-03 | End: 2019-07-03

## 2019-07-08 ENCOUNTER — TELEPHONE (OUTPATIENT)
Dept: INTERNAL MEDICINE CLINIC | Facility: CLINIC | Age: 69
End: 2019-07-08

## 2019-07-08 RX ORDER — BLOOD SUGAR DIAGNOSTIC
STRIP MISCELLANEOUS
Qty: 100 STRIP | Refills: 1 | Status: SHIPPED | OUTPATIENT
Start: 2019-07-08 | End: 2019-07-10

## 2019-07-08 RX ORDER — NATEGLINIDE 60 MG/1
TABLET, COATED ORAL
Qty: 30 TABLET | Refills: 1 | Status: SHIPPED | OUTPATIENT
Start: 2019-07-08 | End: 2019-07-31

## 2019-07-10 RX ORDER — BLOOD SUGAR DIAGNOSTIC
STRIP MISCELLANEOUS
Qty: 100 STRIP | Refills: 1 | Status: SHIPPED | OUTPATIENT
Start: 2019-07-10 | End: 2020-03-09

## 2019-07-31 RX ORDER — NATEGLINIDE 60 MG/1
TABLET, COATED ORAL
Qty: 30 TABLET | Refills: 1 | Status: SHIPPED | OUTPATIENT
Start: 2019-07-31 | End: 2019-08-25

## 2019-08-14 ENCOUNTER — TELEPHONE (OUTPATIENT)
Dept: INTERNAL MEDICINE CLINIC | Facility: CLINIC | Age: 69
End: 2019-08-14

## 2019-08-14 DIAGNOSIS — E11.9 TYPE 2 DIABETES MELLITUS WITHOUT COMPLICATION, WITHOUT LONG-TERM CURRENT USE OF INSULIN (HCC): Primary | ICD-10-CM

## 2019-08-14 DIAGNOSIS — I10 ESSENTIAL HYPERTENSION: ICD-10-CM

## 2019-08-14 NOTE — TELEPHONE ENCOUNTER
Future Appointments   Date Time Provider Bart Sarmiento   10/1/2019 10:30 AM PAWEL Garrido [facility]   11/1/2019  2:00 PM Rhea Suarez MD [facility]   6/1/2020  1:00 PM Caroline Reyes APN [facility]

## 2019-08-26 RX ORDER — NATEGLINIDE 60 MG/1
TABLET, COATED ORAL
Qty: 30 TABLET | Refills: 1 | Status: SHIPPED | OUTPATIENT
Start: 2019-08-26 | End: 2019-09-26

## 2019-08-29 RX ORDER — ATORVASTATIN CALCIUM 10 MG/1
TABLET, FILM COATED ORAL
Qty: 90 TABLET | Refills: 0 | Status: SHIPPED | OUTPATIENT
Start: 2019-08-29 | End: 2019-12-05

## 2019-08-29 NOTE — TELEPHONE ENCOUNTER
Last Ov: 10/29/18, JL, physical  Upcoming appt: 11/1/19  Last labs: CMP, microalb/creat, lipid, CBC, PSA 2/28/19  Last Rx:   Atorvastatin 10mg, #90, 0R 6/6/19  Metformin 1000mg, #180, 1R 3/9/19    Per Protocol, passed. Refills sent.

## 2019-09-16 ENCOUNTER — OFFICE VISIT (OUTPATIENT)
Dept: INTERNAL MEDICINE CLINIC | Facility: CLINIC | Age: 69
End: 2019-09-16
Payer: MEDICARE

## 2019-09-16 VITALS
BODY MASS INDEX: 27 KG/M2 | WEIGHT: 191 LBS | SYSTOLIC BLOOD PRESSURE: 132 MMHG | HEART RATE: 80 BPM | DIASTOLIC BLOOD PRESSURE: 82 MMHG | TEMPERATURE: 98 F

## 2019-09-16 DIAGNOSIS — E11.9 TYPE 2 DIABETES MELLITUS WITHOUT COMPLICATION, WITHOUT LONG-TERM CURRENT USE OF INSULIN (HCC): ICD-10-CM

## 2019-09-16 DIAGNOSIS — L98.9 SKIN LESION: ICD-10-CM

## 2019-09-16 DIAGNOSIS — E78.49 OTHER HYPERLIPIDEMIA: ICD-10-CM

## 2019-09-16 DIAGNOSIS — L98.9 FINGER LESION: ICD-10-CM

## 2019-09-16 DIAGNOSIS — G47.33 OSA (OBSTRUCTIVE SLEEP APNEA): ICD-10-CM

## 2019-09-16 DIAGNOSIS — I10 ESSENTIAL HYPERTENSION: ICD-10-CM

## 2019-09-16 DIAGNOSIS — M54.50 ACUTE MIDLINE LOW BACK PAIN WITHOUT SCIATICA: Primary | ICD-10-CM

## 2019-09-16 PROCEDURE — 99214 OFFICE O/P EST MOD 30 MIN: CPT | Performed by: NURSE PRACTITIONER

## 2019-09-16 RX ORDER — LISINOPRIL 10 MG/1
TABLET ORAL
Qty: 90 TABLET | Refills: 0 | Status: SHIPPED | OUTPATIENT
Start: 2019-09-16 | End: 2019-12-09

## 2019-09-16 NOTE — TELEPHONE ENCOUNTER
"Anesthesia Post Evaluation    Patient: Emily Mckeon    Procedure(s) Performed: Procedure(s) (LRB):  DELIVERY- SECTION (N/A)    Final Anesthesia Type: epidural  Patient location during evaluation: labor & delivery  Patient participation: Yes- Able to Participate  Level of consciousness: awake and alert, oriented and awake  Post-procedure vital signs: reviewed and stable  Pain management: adequate  Airway patency: patent  PONV status at discharge: No PONV  Anesthetic complications: no      Cardiovascular status: blood pressure returned to baseline  Respiratory status: unassisted, spontaneous ventilation and room air  Hydration status: euvolemic  Follow-up not needed.        Visit Vitals  BP (!) 152/80   Pulse 82   Temp 36.7 °C (98 °F) (Oral)   Resp 18   Ht 5' 1" (1.549 m)   Wt 74.8 kg (165 lb)   LMP 2017   SpO2 99%   Breastfeeding? Unknown   BMI 31.18 kg/m²       Pain/Flako Score: Pain Rating Prior to Med Admin: 0 (3/2/2018  9:57 AM)  Pain Rating Post Med Admin: 5 (3/2/2018  7:30 AM)      " Last OV : 10/29/18 CPE  Upcoming appt/reason:    Future Appointments   Date Time Provider Bart Sarmiento   9/16/2019  3:20 PM PAWEL Lucas [facility]   10/1/2019 10:30 AM PAWEL Zelaya [facility]   11/1/2019  2:00 PM Aj Clark MD [facility]   6/1/2020  1:00 PM Kimber Reyes APN [facility] [address]     LISINOPRIL 10 MG Oral Tab 90 tablet 0 6/20/2019     Last labs: 4/1/19 A1C  When pt was asked to return for OV: 6 months

## 2019-09-16 NOTE — PROGRESS NOTES
Goran Hebert is a 77year old male. Patient presents with:  Low Back Pain: LG. Room 11. Low back pain for 3 weeks but its now better       HPI:   Presents for eval of low back pain  Started 3 weeks ago  He lifted his small pet.   Progressed to low back general medical examination at a health care facility      Current Outpatient Medications:  LISINOPRIL 10 MG Oral Tab TAKE 1 TABLET BY MOUTH EVERY DAY Disp: 90 tablet Rfl: 0   METFORMIN HCL 1000 MG Oral Tab TAKE 1 TABLET BY MOUTH TWICE A DAY WITH MEALS Dis Smoking status: Never Smoker      Smokeless tobacco: Never Used    Alcohol use: No      Alcohol/week: 0.0 standard drinks    Drug use: No    Family History   Problem Relation Age of Onset   • Cancer Father         prostate    • Diabetes Mother    • Diabete Prescriptions      No prescriptions requested or ordered in this encounter       Imaging & Consults:  ORTHOPEDIC - INTERNAL  DERM - INTERNAL    No follow-ups on file. There are no Patient Instructions on file for this visit.

## 2019-09-26 RX ORDER — NATEGLINIDE 60 MG/1
TABLET, COATED ORAL
Qty: 30 TABLET | Refills: 1 | Status: SHIPPED | OUTPATIENT
Start: 2019-09-26 | End: 2019-10-18

## 2019-09-26 RX ORDER — DAPAGLIFLOZIN 10 MG/1
TABLET, FILM COATED ORAL
Qty: 90 TABLET | Refills: 0 | Status: SHIPPED | OUTPATIENT
Start: 2019-09-26 | End: 2019-12-18

## 2019-09-27 ENCOUNTER — OFFICE VISIT (OUTPATIENT)
Dept: INTERNAL MEDICINE CLINIC | Facility: CLINIC | Age: 69
End: 2019-09-27
Payer: MEDICARE

## 2019-09-27 VITALS
HEIGHT: 70 IN | TEMPERATURE: 99 F | RESPIRATION RATE: 16 BRPM | BODY MASS INDEX: 27.35 KG/M2 | HEART RATE: 85 BPM | WEIGHT: 191 LBS | SYSTOLIC BLOOD PRESSURE: 132 MMHG | DIASTOLIC BLOOD PRESSURE: 80 MMHG

## 2019-09-27 DIAGNOSIS — M54.42 ACUTE MIDLINE LOW BACK PAIN WITH BILATERAL SCIATICA: Primary | ICD-10-CM

## 2019-09-27 DIAGNOSIS — M54.41 ACUTE MIDLINE LOW BACK PAIN WITH BILATERAL SCIATICA: Primary | ICD-10-CM

## 2019-09-27 PROCEDURE — 99213 OFFICE O/P EST LOW 20 MIN: CPT | Performed by: INTERNAL MEDICINE

## 2019-09-27 NOTE — PROGRESS NOTES
HPI:    Patient ID: Mahi Thompson is a 77year old male.     HPI  Here with 4 weeks of central low back pain after picking up a 6 lb dog, occ with radiation to bilateral buttocks, not to legs, no leg weakness or parasthesias, no bowel or bladder problems, mouth daily. Disp:  Rfl:    Omega-3 Fatty Acids (FISH OIL) 600 MG Oral Cap Take  by mouth. Disp:  Rfl:    aspirin 81 MG Oral Chew Tab Chew 81 mg by mouth daily. Disp:  Rfl:    latanoprost 0.005 % Ophthalmic Solution  Disp:  Rfl:      Allergies:   Other

## 2019-10-01 ENCOUNTER — OFFICE VISIT (OUTPATIENT)
Dept: ENDOCRINOLOGY CLINIC | Facility: CLINIC | Age: 69
End: 2019-10-01
Payer: MEDICARE

## 2019-10-01 VITALS
HEIGHT: 70 IN | DIASTOLIC BLOOD PRESSURE: 74 MMHG | RESPIRATION RATE: 20 BRPM | SYSTOLIC BLOOD PRESSURE: 118 MMHG | BODY MASS INDEX: 27.2 KG/M2 | WEIGHT: 190 LBS | HEART RATE: 76 BPM

## 2019-10-01 DIAGNOSIS — E11.9 TYPE 2 DIABETES MELLITUS WITHOUT COMPLICATION, WITHOUT LONG-TERM CURRENT USE OF INSULIN (HCC): Primary | ICD-10-CM

## 2019-10-01 PROCEDURE — 99214 OFFICE O/P EST MOD 30 MIN: CPT | Performed by: NURSE PRACTITIONER

## 2019-10-01 PROCEDURE — 83036 HEMOGLOBIN GLYCOSYLATED A1C: CPT | Performed by: NURSE PRACTITIONER

## 2019-10-01 NOTE — PATIENT INSTRUCTIONS
A1C 7.1%     This up from 6.9%     Continue:   Nateglinide 60mg daily w dinner   Januvia 100mg once daiy   Farxiga 10mg once daily   Metformin 1000mg twice daily    We should see improved blood sugar trends as you become more active       American Diabetes

## 2019-10-01 NOTE — PROGRESS NOTES
Patient presents with:  Diabetes: room-16 cf. pt did bring readings. HPI:   Alexey Dobbs is a 77year old male presenting for type 2 DM medication management.  In the past 3m, DM control has increased slightly to 7.1%  (last A1C 6.9% )     Injured back abou adherence: yes   Less active past 4 weeks  Stress w back pain   Recent steroids, illness or infections: no     Allergies:  Other; Flu Virus Vaccine    Past Medical History:   Diagnosis Date   • Essential hypertension    • Hematuria    • Hyperlipidemia    • Disp: 90 tablet Rfl: 1   MEMANTINE HCL 10 MG Oral Tab TAKE 1 TABLET BY MOUTH TWICE A DAY Disp: 60 tablet Rfl: 0   Dorzolamide HCl-Timolol Mal 22.3-6.8 MG/ML Ophthalmic Solution  Disp:  Rfl:    Calcium Carbonate Antacid 600 MG Oral Chew Tab Chew 600 mg by m eating late   Weight: 190 lb  (last weight 187 lb )        Continue:   Nateglinide 60mg daily   Januvia 100mg once daily   Farxiga 10mg once daily   Metformin 1000mg twice daily  ~discussed if next a1c increases to consider changing Januvia to GLP or incre

## 2019-10-14 ENCOUNTER — HOSPITAL ENCOUNTER (OUTPATIENT)
Dept: PHYSICAL THERAPY | Facility: HOSPITAL | Age: 69
Setting detail: THERAPIES SERIES
Discharge: HOME | End: 2019-10-14
Attending: INTERNAL MEDICINE
Payer: MEDICARE

## 2019-10-14 DIAGNOSIS — M54.42 ACUTE MIDLINE LOW BACK PAIN WITH BILATERAL SCIATICA: ICD-10-CM

## 2019-10-14 DIAGNOSIS — M54.41 ACUTE MIDLINE LOW BACK PAIN WITH BILATERAL SCIATICA: ICD-10-CM

## 2019-10-14 PROCEDURE — 97110 THERAPEUTIC EXERCISES: CPT

## 2019-10-14 PROCEDURE — 97161 PT EVAL LOW COMPLEX 20 MIN: CPT

## 2019-10-16 ENCOUNTER — HOSPITAL ENCOUNTER (OUTPATIENT)
Dept: PHYSICAL THERAPY | Facility: HOSPITAL | Age: 69
Setting detail: THERAPIES SERIES
Discharge: HOME | End: 2019-10-16
Attending: INTERNAL MEDICINE
Payer: MEDICARE

## 2019-10-16 PROCEDURE — 97140 MANUAL THERAPY 1/> REGIONS: CPT

## 2019-10-16 PROCEDURE — 97110 THERAPEUTIC EXERCISES: CPT

## 2019-10-16 NOTE — PROGRESS NOTES
Dx: Acute midline low back pain with bilateral sciatica (M54.42,M54.41)           Insurance (Authorized # of Visits):  8 per POC           Authorizing Physician: Dr. Akhil Eli  Next MD visit: none scheduled  Fall Risk: standard         Precautions: n/a 10        Quadruped camel cat x 10       Quadruped arm leg balance 5 x 5 sec hold       HEP: issued at [employer]    Charges: man x1 10, there ex x 2 35       Total Timed Treatment: 10 min  Total Treatment Time: 45 min

## 2019-10-16 NOTE — PROGRESS NOTES
SPINE EVALUATION:   Referring Physician: Dr. Dorothy Felix  Diagnosis: Acute midline low back pain with bilateral sciatica (M54.42,M54.41)     Date of Service: 10/14/2019     PATIENT SUMMARY   Kaye Bertrand is a 77year old male who presents to therapy toda referred to PT last October. Functional deficits include but are not limited to running, lifting and heavy activities for the back; reaching activities for R shoulder. Pt and PT discussed evaluation findings, pathology, POC and HEP.   Pt voiced understandi [email or web address]   Date: 10/14/2019   Prepared by: Delfin Labor     Exercises   Supine Lower Trunk Rotation - 10 reps - 2 sets - 2x daily - 7x weekly   Supine Bridge - 10 reps - 2 sets - 2x daily - 7x weekly   Supine Sciatic Nerve Glide - treatment options and has agreed to actively participate in planning and for this course of care. Thank you for your referral. Please co-sign or sign and return this letter via fax as soon as possible to [phone].  If you have any questions, please c

## 2019-10-18 RX ORDER — NATEGLINIDE 60 MG/1
TABLET, COATED ORAL
Qty: 30 TABLET | Refills: 1 | Status: SHIPPED | OUTPATIENT
Start: 2019-10-18 | End: 2020-01-09 | Stop reason: SDUPTHER

## 2019-10-19 RX ORDER — NATEGLINIDE 60 MG/1
TABLET, COATED ORAL
Qty: 90 TABLET | Refills: 0 | Status: SHIPPED | OUTPATIENT
Start: 2019-10-19 | End: 2020-01-09

## 2019-10-23 ENCOUNTER — HOSPITAL ENCOUNTER (OUTPATIENT)
Dept: PHYSICAL THERAPY | Facility: HOSPITAL | Age: 69
Setting detail: THERAPIES SERIES
Discharge: HOME | End: 2019-10-23
Attending: INTERNAL MEDICINE
Payer: MEDICARE

## 2019-10-23 PROCEDURE — 97110 THERAPEUTIC EXERCISES: CPT

## 2019-10-23 PROCEDURE — 97140 MANUAL THERAPY 1/> REGIONS: CPT

## 2019-10-23 NOTE — PROGRESS NOTES
Dx: Acute midline low back pain with bilateral sciatica (M54.42,M54.41)           Insurance (Authorized # of Visits):  8 per POC           Authorizing Physician: Dr. Jailene Kiran  Next MD visit: none scheduled  Fall Risk: standard         Precautions: n/a with strap  (B) piriformis stretches, both ways      Bridge 2 x 10  Bridge with SB x 10  Bridge with march 3 x 5 ea      Abdominal bracing  AB with SKTC  AB with DKTC Squats at the wall with SB  Push ups at the wall with SB  SB rolls up the wall for should

## 2019-10-25 ENCOUNTER — HOSPITAL ENCOUNTER (OUTPATIENT)
Dept: PHYSICAL THERAPY | Facility: HOSPITAL | Age: 69
Setting detail: THERAPIES SERIES
Discharge: HOME | End: 2019-10-25
Attending: INTERNAL MEDICINE
Payer: MEDICARE

## 2019-10-25 PROCEDURE — 97110 THERAPEUTIC EXERCISES: CPT

## 2019-10-25 PROCEDURE — 97140 MANUAL THERAPY 1/> REGIONS: CPT

## 2019-10-25 NOTE — PROGRESS NOTES
Dx: Acute midline low back pain with bilateral sciatica (M54.42,M54.41)           Insurance (Authorized # of Visits):  8 per POC           Authorizing Physician: Dr. Korin Coulter  Next MD visit: none scheduled  Fall Risk: standard         Precautions: n/a hamstring stretches with strap  (B) piriformis stretches, both ways  R long axis traction 5 c 10 sec     Bridge 2 x 10  Bridge with SB x 10  Bridge with march 3 x 5 ea Bridge x 10  Bridge with SB x 10   Bridge with marching 2 x 10 ea     Abdominal bracing

## 2019-10-28 ENCOUNTER — HOSPITAL ENCOUNTER (OUTPATIENT)
Dept: PHYSICAL THERAPY | Facility: HOSPITAL | Age: 69
Setting detail: THERAPIES SERIES
Discharge: HOME | End: 2019-10-28
Attending: INTERNAL MEDICINE
Payer: MEDICARE

## 2019-10-28 PROCEDURE — 97140 MANUAL THERAPY 1/> REGIONS: CPT

## 2019-10-28 PROCEDURE — 97110 THERAPEUTIC EXERCISES: CPT

## 2019-10-28 NOTE — PROGRESS NOTES
Dx: Acute midline low back pain with bilateral sciatica (M54.42,M54.41)           Insurance (Authorized # of Visits):  8 per POC           Authorizing Physician: Dr. Akhil Eli  Next MD visit: none scheduled  Fall Risk: standard         Precautions: n/a strap  (B) piriformis stretches, both ways (B) hamstring stretches with strap  (B) piriformis stretches, both ways  R long axis traction 5 c 10 sec (B) hamstring stretches with strap  (B) piriformis stretches, both ways  R long axis traction 5 c 10 sec

## 2019-10-30 ENCOUNTER — HOSPITAL ENCOUNTER (OUTPATIENT)
Dept: PHYSICAL THERAPY | Facility: HOSPITAL | Age: 69
Setting detail: THERAPIES SERIES
Discharge: HOME | End: 2019-10-30
Attending: INTERNAL MEDICINE
Payer: MEDICARE

## 2019-10-30 PROCEDURE — 97110 THERAPEUTIC EXERCISES: CPT

## 2019-10-30 PROCEDURE — 97140 MANUAL THERAPY 1/> REGIONS: CPT

## 2019-10-30 NOTE — PROGRESS NOTES
Dx: Acute midline low back pain with bilateral sciatica (M54.42,M54.41)           Insurance (Authorized # of Visits):  8 per POC           Authorizing Physician: Dr. Lexi Leiva MD visit: none scheduled  Fall Risk: standard         Precautions: n/a hamstring stretches with strap  (B) piriformis stretches, both ways (B) hamstring stretches with strap  (B) piriformis stretches, both ways  R long axis traction 5 c 10 sec (B) hamstring stretches with strap  (B) piriformis stretches, both ways  R long axi sec hold (B) 7 x 5 sec hold (B)   HEP: issued at eval    Charges: man x1 10, there ex x 2 35       Total Timed Treatment: 45 min  Total Treatment Time: 45 min

## 2019-11-04 ENCOUNTER — HOSPITAL ENCOUNTER (OUTPATIENT)
Dept: PHYSICAL THERAPY | Facility: HOSPITAL | Age: 69
Setting detail: THERAPIES SERIES
Discharge: HOME | End: 2019-11-04
Attending: INTERNAL MEDICINE
Payer: MEDICARE

## 2019-11-04 PROCEDURE — 97140 MANUAL THERAPY 1/> REGIONS: CPT

## 2019-11-04 PROCEDURE — 97110 THERAPEUTIC EXERCISES: CPT

## 2019-11-04 NOTE — PROGRESS NOTES
Dx: Acute midline low back pain with bilateral sciatica (M54.42,M54.41)           Insurance (Authorized # of Visits):  8 per POC           Authorizing Physician: Dr. Bashir Harrison  Next MD visit: none scheduled  Fall Risk: standard         Precautions: n/a 10/25/19              TX#: 4/8 Date:    10/28/19            TX#: 5/8 Date: 10/30/19  Tx#: 6/8 Date: 11/4/19  Tx#: 7/8   Lower trunk rotations  (B) Sciatic nerve glides -- --   Slump glides L/R x 10   (B) hamstring stretches with strap  (B) piriformis stret unilateral  Prone central PA mobs L4/5 and L5/S1 Gr III, central and R unilateral  Prone thoracic PA mobs Gr III central and R unilateral  Squats at the wall bar  Seated on SB  -- Prone press ups 2 x 10    (B) quad stretch in prone   Prone press ups x 10

## 2019-11-06 ENCOUNTER — HOSPITAL ENCOUNTER (OUTPATIENT)
Dept: PHYSICAL THERAPY | Facility: HOSPITAL | Age: 69
Setting detail: THERAPIES SERIES
Discharge: HOME | End: 2019-11-06
Attending: INTERNAL MEDICINE
Payer: MEDICARE

## 2019-11-06 PROCEDURE — 97110 THERAPEUTIC EXERCISES: CPT

## 2019-11-06 PROCEDURE — 97140 MANUAL THERAPY 1/> REGIONS: CPT

## 2019-11-06 NOTE — PROGRESS NOTES
Discharge Physical Therapy Summary  Pt has attended 8 visits in Physical Therapy. Subjective: Behrooz states that his lower back feels 50-60% better. He states that his back feels more relaxed. He reports no episodes of left sided lower back pain.  He ease with bending forward to don shoes. Part met; to continue with HEP   · Pt will have improved hamstrings and hip flexors flexibility for decreased stress on lumbar spine with daily activities.  Part met; to continue with HEP  · Pt will demonstrate improv marching 2 x 10 ea -- --   Abdominal bracing  AB with SKTC  AB with DKTC Squats at the wall with SB  Push ups at the wall with SB  SB rolls up the wall for shoulders/thoracic spine stretch  Hkly abdominal bracing with alternate SKTC x 12 L/R continuous man [email or web address]   URL: [email or web address]   Date: 11/06/2019   Prepared by: Chaparro Velasquez     Exercises   Squat at Table - 10 reps - 2 sets - 1x daily - 7x weekly   Wall Squat with Swiss Ball - 10 reps - 2 sets - 1x daily - 7x weekly   Deep Squat wi English

## 2019-11-07 ENCOUNTER — TELEPHONE (OUTPATIENT)
Dept: INTERNAL MEDICINE CLINIC | Facility: CLINIC | Age: 69
End: 2019-11-07

## 2019-11-07 DIAGNOSIS — M25.511 CHRONIC RIGHT SHOULDER PAIN: Primary | ICD-10-CM

## 2019-11-07 DIAGNOSIS — G89.29 CHRONIC RIGHT SHOULDER PAIN: Primary | ICD-10-CM

## 2019-11-07 NOTE — TELEPHONE ENCOUNTER
Called pt stating never used ordered PT from 10/2018 as pt indicating was feeling better, tried some home exercises on own with no improvements. Pt requesting we re-order PT- pended for your approval if ok. Please review and advise. Thank you.

## 2019-11-08 NOTE — TELEPHONE ENCOUNTER
Please call pt to let him know JL ordered Physical Therapy for him and can call [facility] PT at [phone] to schedule in our bldg if he wishes.

## 2019-11-11 RX ORDER — SITAGLIPTIN 100 MG/1
TABLET, FILM COATED ORAL
Qty: 90 TABLET | Refills: 1 | Status: SHIPPED | OUTPATIENT
Start: 2019-11-11 | End: 2020-02-03 | Stop reason: ALTCHOICE

## 2019-11-15 ENCOUNTER — LAB ENCOUNTER (OUTPATIENT)
Dept: LAB | Facility: HOSPITAL | Age: 69
End: 2019-11-15
Attending: INTERNAL MEDICINE
Payer: MEDICARE

## 2019-11-15 DIAGNOSIS — I10 ESSENTIAL HYPERTENSION: ICD-10-CM

## 2019-11-15 DIAGNOSIS — E78.49 OTHER HYPERLIPIDEMIA: ICD-10-CM

## 2019-11-15 DIAGNOSIS — E11.9 TYPE 2 DIABETES MELLITUS WITHOUT COMPLICATION, WITHOUT LONG-TERM CURRENT USE OF INSULIN (HCC): ICD-10-CM

## 2019-11-15 PROCEDURE — 85025 COMPLETE CBC W/AUTO DIFF WBC: CPT

## 2019-11-15 PROCEDURE — 36415 COLL VENOUS BLD VENIPUNCTURE: CPT

## 2019-11-15 PROCEDURE — 83036 HEMOGLOBIN GLYCOSYLATED A1C: CPT

## 2019-11-15 PROCEDURE — 80053 COMPREHEN METABOLIC PANEL: CPT

## 2019-11-15 PROCEDURE — 80061 LIPID PANEL: CPT

## 2019-11-18 ENCOUNTER — APPOINTMENT (OUTPATIENT)
Dept: PHYSICAL THERAPY | Facility: HOSPITAL | Age: 69
End: 2019-11-18
Attending: INTERNAL MEDICINE
Payer: MEDICARE

## 2019-11-18 ENCOUNTER — HOSPITAL ENCOUNTER (OUTPATIENT)
Dept: PHYSICAL THERAPY | Facility: HOSPITAL | Age: 69
Setting detail: THERAPIES SERIES
Discharge: HOME | End: 2019-11-18
Attending: INTERNAL MEDICINE
Payer: MEDICARE

## 2019-11-18 DIAGNOSIS — M25.511 CHRONIC RIGHT SHOULDER PAIN: ICD-10-CM

## 2019-11-18 DIAGNOSIS — G89.29 CHRONIC RIGHT SHOULDER PAIN: ICD-10-CM

## 2019-11-18 PROCEDURE — 97161 PT EVAL LOW COMPLEX 20 MIN: CPT

## 2019-11-18 PROCEDURE — 97110 THERAPEUTIC EXERCISES: CPT

## 2019-11-18 NOTE — PROGRESS NOTES
SHOULDER EVALUATION:   Referring Physician: Dr. Puja Boston  Diagnosis: Chronic right shoulder pain (M25.511,G89.29)        Date of Service: 11/18/2019     PATIENT SUMMARY   Angel Curiel is a 77year old male who presents to therapy today with complaints None  OBJECTIVE:   Observation/Posture: rounded thoracic spine; flattened lumbar lordosis; shoulders rotated internally   Palpation: tenderness right upper trapezius and levator scapulae; tightness in posterior shoulder; stiffness teres minor and major  Se Abduction - 3 reps - 1 sets - 15 sec hold - 1x daily - 7x weekly   Standing Single Arm Shoulder Flexion Stretch on Wall - 3 reps - 1 sets - 15 sec hold - 1x daily - 7x weekly   Shoulder External Rotation and Scapular Retraction with Resistance - 10 reps - of care. Thank you for your referral. Please co-sign or sign and return this letter via fax as soon as possible to [phone].  If you have any questions, please contact me at Dept: [phone]    Sincerely,  Electronically signed by therapist: Rose Marie Horta

## 2019-11-26 ENCOUNTER — HOSPITAL ENCOUNTER (OUTPATIENT)
Dept: PHYSICAL THERAPY | Facility: HOSPITAL | Age: 69
Setting detail: THERAPIES SERIES
Discharge: HOME | End: 2019-11-26
Attending: INTERNAL MEDICINE
Payer: MEDICARE

## 2019-11-26 PROCEDURE — 97110 THERAPEUTIC EXERCISES: CPT

## 2019-11-26 PROCEDURE — 97140 MANUAL THERAPY 1/> REGIONS: CPT

## 2019-11-27 NOTE — PROGRESS NOTES
Dx: Chronic right shoulder pain (M25.511,G89.29)         Insurance (Authorized # of Visits):  8 per POC           Authorizing Physician: Dr. Page Expose  Next MD visit: none scheduled  Fall Risk: standard         Precautions: n/a             Subjective: R sh open book stretch       R shoulder ER in sdly #2 3 x 10        Prone Hor ABD with ER 2 x 10  Prone rows 2 x 10        Wall push ups x 20       YTB lateral glide  YTB lateral steps       Corner stretch       HEP: issued at eval    Charges: man x 1, there ex

## 2019-12-02 ENCOUNTER — APPOINTMENT (OUTPATIENT)
Dept: PHYSICAL THERAPY | Facility: HOSPITAL | Age: 69
End: 2019-12-02
Attending: INTERNAL MEDICINE
Payer: MEDICARE

## 2019-12-02 ENCOUNTER — HOSPITAL ENCOUNTER (OUTPATIENT)
Dept: PHYSICAL THERAPY | Facility: HOSPITAL | Age: 69
Setting detail: THERAPIES SERIES
Discharge: HOME | End: 2019-12-02
Attending: INTERNAL MEDICINE
Payer: MEDICARE

## 2019-12-02 PROCEDURE — 97140 MANUAL THERAPY 1/> REGIONS: CPT

## 2019-12-02 PROCEDURE — 97110 THERAPEUTIC EXERCISES: CPT

## 2019-12-02 NOTE — PROGRESS NOTES
Dx: Chronic right shoulder pain (M25.511,G89.29)         Insurance (Authorized # of Visits):  8 per POC           Authorizing Physician: Dr. Leslie Salomon  Next MD visit: none scheduled  Fall Risk: standard         Precautions: n/a             Subjective: R sh all planes with scapular assistance R shoulder ROM in all planes with scapular assistance      R posterior capsule stretch in sdly R shoulder ER stretch in sdly       R open book stretch       R shoulder ER in sdly #2 3 x 10        Prone Hor ABD with ER 2

## 2019-12-04 ENCOUNTER — APPOINTMENT (OUTPATIENT)
Dept: PHYSICAL THERAPY | Facility: HOSPITAL | Age: 69
End: 2019-12-04
Attending: INTERNAL MEDICINE
Payer: MEDICARE

## 2019-12-04 ENCOUNTER — HOSPITAL ENCOUNTER (OUTPATIENT)
Dept: PHYSICAL THERAPY | Facility: HOSPITAL | Age: 69
Setting detail: THERAPIES SERIES
Discharge: HOME | End: 2019-12-04
Attending: INTERNAL MEDICINE
Payer: MEDICARE

## 2019-12-04 PROCEDURE — 97140 MANUAL THERAPY 1/> REGIONS: CPT

## 2019-12-04 PROCEDURE — 97110 THERAPEUTIC EXERCISES: CPT

## 2019-12-04 PROCEDURE — 97112 NEUROMUSCULAR REEDUCATION: CPT

## 2019-12-04 NOTE — PROGRESS NOTES
Dx: Chronic right shoulder pain (M25.511,G89.29)         Insurance (Authorized # of Visits):  8 per POC           Authorizing Physician: Dr. Eloisa Caban  Next MD visit: none scheduled  Fall Risk: standard         Precautions: n/a             Subjective: Feel 3/8 Date:    12/4/19             TX#: 4/8 Date:                 TX#: 5/ Date:    Tx#: 6/   R GHJ caudal and AP glides, Gr III R GHJ posterior glide with flexion  R GHJ AP glides  R GHJ distraction R GHJ posterior and caudal glides 4'       R shoulder ROM in

## 2019-12-05 RX ORDER — ATORVASTATIN CALCIUM 10 MG/1
TABLET, FILM COATED ORAL
Qty: 90 TABLET | Refills: 0 | Status: SHIPPED | OUTPATIENT
Start: 2019-12-05 | End: 2020-03-09

## 2019-12-09 ENCOUNTER — HOSPITAL ENCOUNTER (OUTPATIENT)
Dept: PHYSICAL THERAPY | Facility: HOSPITAL | Age: 69
Setting detail: THERAPIES SERIES
Discharge: HOME | End: 2019-12-09
Attending: INTERNAL MEDICINE
Payer: MEDICARE

## 2019-12-09 ENCOUNTER — APPOINTMENT (OUTPATIENT)
Dept: PHYSICAL THERAPY | Facility: HOSPITAL | Age: 69
End: 2019-12-09
Attending: INTERNAL MEDICINE
Payer: MEDICARE

## 2019-12-09 PROCEDURE — 97110 THERAPEUTIC EXERCISES: CPT

## 2019-12-09 PROCEDURE — 97140 MANUAL THERAPY 1/> REGIONS: CPT

## 2019-12-09 PROCEDURE — 97112 NEUROMUSCULAR REEDUCATION: CPT

## 2019-12-09 RX ORDER — LISINOPRIL 10 MG/1
TABLET ORAL
Qty: 90 TABLET | Refills: 0 | Status: SHIPPED | OUTPATIENT
Start: 2019-12-09 | End: 2020-03-02

## 2019-12-09 NOTE — TELEPHONE ENCOUNTER
Last VISIT 09/27/19 JL  Last REFILL 09/16/19 90 tablet 0 refil  Last LABS 11/15/19 HgA1c, Lipid, CMP, CBC,     Per PROTOCOL?  Protocol Passed

## 2019-12-09 NOTE — PROGRESS NOTES
Dx: Chronic right shoulder pain (M25.511,G89.29)         Insurance (Authorized # of Visits):  8 per POC           Authorizing Physician: Dr. Aruna Kellogg  Next MD visit: none scheduled  Fall Risk: standard         Precautions: n/a             Subjective: Pt r compliant with comprehensive HEP to maintain progress achieved in PT     Plan: R GHJ mobilization; posterior capsule stretches; serratus anterior exercises, scapular stabilization   Date: 11/26/2019  TX#: 2/8 Date: 12/2/2019                TX#: 3/8 Date: Corner stretch Wall push ups 2x15  Corner stretch 3x30\" --     HEP: issued at [employer]    Access Code: [email or web address]   URL: [email or web address]   Date: 11/18/2019   Prepared by: Jann Moreno   Sleeper Stretch - 3 reps - 1 sets - 15 se

## 2019-12-11 ENCOUNTER — HOSPITAL ENCOUNTER (OUTPATIENT)
Dept: PHYSICAL THERAPY | Facility: HOSPITAL | Age: 69
Setting detail: THERAPIES SERIES
Discharge: HOME | End: 2019-12-11
Attending: INTERNAL MEDICINE
Payer: MEDICARE

## 2019-12-11 PROCEDURE — 97140 MANUAL THERAPY 1/> REGIONS: CPT

## 2019-12-11 PROCEDURE — 97110 THERAPEUTIC EXERCISES: CPT

## 2019-12-11 NOTE — PROGRESS NOTES
Dx: Chronic right shoulder pain (M25.511,G89.29)         Insurance (Authorized # of Visits):  8 per POC           Authorizing Physician: Dr. Lexi Leiva MD visit: none scheduled  Fall Risk: standard         Precautions: n/a             Subjective: Pt r 11/26/2019  TX#: 2/8 Date: 12/2/2019                TX#: 3/8 Date:    12/4/19             TX#: 4/8 Date: 12/9/2019               TX#: 5/8 Date: 12/11/2019  Tx#: 6/8   R GHJ caudal and AP glides, Gr III R GHJ posterior glide with flexion  R GHJ AP glides  R anterior lunge 2' Standing R shoulder 1# ball bounces at wall in  shoulder abduction x10   YTB lateral glide  YTB lateral steps (B) UE wall walks with YTB and scapular assistance 2X15  (B) UE wall slides with YTB  Supine thoracic extension over foam

## 2019-12-16 ENCOUNTER — HOSPITAL ENCOUNTER (OUTPATIENT)
Dept: PHYSICAL THERAPY | Facility: HOSPITAL | Age: 69
Setting detail: THERAPIES SERIES
Discharge: HOME | End: 2019-12-16
Attending: INTERNAL MEDICINE
Payer: MEDICARE

## 2019-12-16 PROCEDURE — 97140 MANUAL THERAPY 1/> REGIONS: CPT

## 2019-12-16 PROCEDURE — 97110 THERAPEUTIC EXERCISES: CPT

## 2019-12-16 NOTE — PROGRESS NOTES
Dx: Chronic right shoulder pain (M25.511,G89.29)         Insurance (Authorized # of Visits):  8 per POC           Authorizing Physician: Dr. Bashir Harrison  Next MD visit: none scheduled  Fall Risk: standard         Precautions: n/a             Subjective: Pt r maintain progress achieved in PT     Plan: R GHJ mobilization; serratus anterior exercises, scapular stabilization, progress GHJ AROM  Date: 11/26/2019  TX#: 2/8 Date: 12/2/2019                TX#: 3/8 Date:    12/4/19             TX#: 4/8 Date: 12/9/2019 shoulder wall clocks with YTB 2x10  12-6 o'clock positions Sdly scapular isometrics in elevation, depression, protraction, and retraction x5' for improved proprioception Standing R shoulder wall clocks with RTB 2x10  12-6 o'clock positions Standing L shoul 1/day    HEP2GO  Scapular clocks with RTB and rhomboid contraction x20 1x/day 7x weekly  Upper trapezius stretch, seated, contralateral shoulder abducted and ER 3x30\" 3x/day 7x weekly      Charges:  man x 1, there ex x 2         Total Timed Treatment: 45

## 2019-12-18 ENCOUNTER — APPOINTMENT (OUTPATIENT)
Dept: PHYSICAL THERAPY | Facility: HOSPITAL | Age: 69
End: 2019-12-18
Attending: INTERNAL MEDICINE
Payer: MEDICARE

## 2019-12-18 RX ORDER — DAPAGLIFLOZIN 10 MG/1
TABLET, FILM COATED ORAL
Qty: 90 TABLET | Refills: 0 | Status: SHIPPED | OUTPATIENT
Start: 2019-12-18 | End: 2020-03-16

## 2019-12-23 ENCOUNTER — HOSPITAL ENCOUNTER (OUTPATIENT)
Dept: PHYSICAL THERAPY | Facility: HOSPITAL | Age: 69
Setting detail: THERAPIES SERIES
Discharge: HOME | End: 2019-12-23
Attending: INTERNAL MEDICINE
Payer: MEDICARE

## 2019-12-23 PROCEDURE — 97110 THERAPEUTIC EXERCISES: CPT

## 2019-12-27 NOTE — ADDENDUM NOTE
Encounter addended by: Christen Lozano PT on: 12/27/2019 12:27 PM   Actions taken: Clinical Note Signed

## 2019-12-27 NOTE — PROGRESS NOTES
Progress Summary  Pt has attended 8 visits in Physical Therapy. Subjective: Behrooz reports good improvement with R shoulder pain and function. He is able to reach behind the back with much less difficulty and no pain.  He states that he continues to hypomobility    Flexibility: moderate restrictions pectoralis minor and major on R and mild on L    Strength/MMT: (* denotes performed with pain)  Shoulder Scapular   Flexion: R 5/5; L 5/5  Abduction: R 5/5; L 5/5  ER: R 5-/5; L 5/5  IR: R 5/5; L 5/5 Rhomb exercises including ROM, strengthening, stretching program; neuromuscular re-ed for posture re-training, glenohumeral and scapular open and closed chain stabilization training; Pt. education for posture, body mechanics, and ergonomics.  Modalities as needed abduction, ER with scapular assistance R shoulder PROM into flexion, abduction, ER with scapular assistance R shoulder PROM into flexion, abduction, ER with scapular assistance  4' R shoulder PROM into flexion, abduction, ER with scapular assistance  4' -- walks with YTB and scapular assistance 2X15  (B) UE wall slides with YTB  Supine thoracic extension over foam roll 2x10 with 3 second hold (B) Supine serratus anterior punches with 2# 3x10 Updated HEP (see below) Updated HEP (see below) --   Corner stretch

## 2020-01-09 RX ORDER — NATEGLINIDE 60 MG/1
TABLET, COATED ORAL
Qty: 90 TABLET | Refills: 0 | Status: SHIPPED | OUTPATIENT
Start: 2020-01-09 | End: 2020-04-01

## 2020-01-09 NOTE — TELEPHONE ENCOUNTER
Medication(s) to Refill:   Requested Prescriptions     Pending Prescriptions Disp Refills   • NATEGLINIDE 60 MG Oral Tab [Pharmacy Med Name: NATEGLINIDE 60 MG TABLET] 90 tablet 0     Sig: TAKE ONE TABLET BY MOUTH BEFORE DINNER       Last Time Medication wa

## 2020-02-03 ENCOUNTER — OFFICE VISIT (OUTPATIENT)
Dept: ENDOCRINOLOGY CLINIC | Facility: CLINIC | Age: 70
End: 2020-02-03
Payer: MEDICARE

## 2020-02-03 VITALS
BODY MASS INDEX: 27.49 KG/M2 | SYSTOLIC BLOOD PRESSURE: 110 MMHG | HEIGHT: 70 IN | WEIGHT: 192 LBS | DIASTOLIC BLOOD PRESSURE: 70 MMHG | HEART RATE: 82 BPM

## 2020-02-03 DIAGNOSIS — E11.65 TYPE 2 DIABETES MELLITUS WITH HYPERGLYCEMIA, WITHOUT LONG-TERM CURRENT USE OF INSULIN (HCC): Primary | ICD-10-CM

## 2020-02-03 LAB
CARTRIDGE LOT#: 588 NUMERIC
CREAT UR-SCNC: 34 MG/DL
HEMOGLOBIN A1C: 6.8 % (ref 4.3–5.6)
MICROALBUMIN UR-MCNC: <0.5 MG/DL

## 2020-02-03 PROCEDURE — 99214 OFFICE O/P EST MOD 30 MIN: CPT | Performed by: NURSE PRACTITIONER

## 2020-02-03 PROCEDURE — 83036 HEMOGLOBIN GLYCOSYLATED A1C: CPT | Performed by: NURSE PRACTITIONER

## 2020-02-03 PROCEDURE — 82570 ASSAY OF URINE CREATININE: CPT | Performed by: NURSE PRACTITIONER

## 2020-02-03 PROCEDURE — 82043 UR ALBUMIN QUANTITATIVE: CPT | Performed by: NURSE PRACTITIONER

## 2020-02-03 NOTE — PATIENT INSTRUCTIONS
A1C 6.8%     Lets stop Januvia 100mg once daily    Continue:   Nateglinide  60mg before dinner   Farxiga 10mg once daily   Metformin 1000mg twice daily (take with food to avoid stomach upset)     Due to long term Metformin use I would recommend daily B12 1

## 2020-02-03 NOTE — PROGRESS NOTES
Patient presents with:  Diabetes: room-17 cf. pt did bring readings. HPI:   Karen Jade is a 77year old male presenting for type 2 DM medication management.  In the past 3m, DM control has improved to 6.8% ( last A1C 7.7% )   He has been walking every AM • Obstructive sleep apnea (adult) (pediatric)     AHI 6 autoPAP 6-16 Sleep RX    • Type II or unspecified type diabetes mellitus without mention of complication, not stated as uncontrolled      Past Surgical History:   Procedure Laterality Date   • HERNI (FISH OIL) 600 MG Oral Cap Take  by mouth. • aspirin 81 MG Oral Chew Tab Chew 81 mg by mouth daily.      • latanoprost 0.005 % Ophthalmic Solution      • Dorzolamide HCl-Timolol Mal 22.3-6.8 MG/ML Ophthalmic Solution        Review of Systems   Constitut Continue:   Nateglinide 60mg daily   Farxiga 10mg once daily   Metformin 1000mg twice daily- reminded to take w food to avoid GI sxs   Discussed benefits of extended release but not interested at this time  Also, discussed B12 1000mcg daily due to long

## 2020-03-02 RX ORDER — LISINOPRIL 10 MG/1
TABLET ORAL
Qty: 90 TABLET | Refills: 1 | Status: SHIPPED | OUTPATIENT
Start: 2020-03-02 | End: 2020-08-26

## 2020-03-02 NOTE — TELEPHONE ENCOUNTER
Last VISIT 2/3/20 CAB    Last REFILL 12/9/19 90T 0R    Last LABS 2/3/20 Microalb/ Creat, HgA1c. 11/15/19 Lipid, CMP    Future Appointments   Date Time Provider Bart Sarmiento   5/12/2020  2:45 PM PAWEL Tripp [facility]   6/1/20

## 2020-03-09 RX ORDER — ATORVASTATIN CALCIUM 10 MG/1
TABLET, FILM COATED ORAL
Qty: 90 TABLET | Refills: 0 | Status: SHIPPED | OUTPATIENT
Start: 2020-03-09 | End: 2020-06-02

## 2020-03-09 RX ORDER — BLOOD SUGAR DIAGNOSTIC
STRIP MISCELLANEOUS
Qty: 100 STRIP | Refills: 1 | Status: SHIPPED | OUTPATIENT
Start: 2020-03-09 | End: 2020-09-01

## 2020-03-16 RX ORDER — DAPAGLIFLOZIN 10 MG/1
TABLET, FILM COATED ORAL
Qty: 90 TABLET | Refills: 0 | Status: SHIPPED | OUTPATIENT
Start: 2020-03-16 | End: 2020-06-10

## 2020-04-01 RX ORDER — NATEGLINIDE 60 MG/1
TABLET, COATED ORAL
Qty: 90 TABLET | Refills: 0 | Status: SHIPPED | OUTPATIENT
Start: 2020-04-01 | End: 2020-06-23

## 2020-05-28 ENCOUNTER — TELEPHONE (OUTPATIENT)
Dept: INTERNAL MEDICINE CLINIC | Facility: CLINIC | Age: 70
End: 2020-05-28

## 2020-05-28 RX ORDER — SITAGLIPTIN 100 MG/1
TABLET, FILM COATED ORAL
Qty: 90 TABLET | Refills: 1 | Status: SHIPPED | OUTPATIENT
Start: 2020-05-28 | End: 2020-05-28 | Stop reason: ALTCHOICE

## 2020-05-28 NOTE — TELEPHONE ENCOUNTER
Last VISIT 2/3/20 CAB DM    Last REFILL 11/11/19 Januvia 90T 1R,  Medication D/C 2/3/20    Last LABS 2/3/20 Microalb/Creat, HgA1c      Future Appointments   Date Time Provider Bart Sarmiento   6/1/2020  1:00 PM Jerry Bourne MD [facility] SANDOVAL  [facility]

## 2020-06-02 RX ORDER — ATORVASTATIN CALCIUM 10 MG/1
TABLET, FILM COATED ORAL
Qty: 90 TABLET | Refills: 0 | Status: SHIPPED | OUTPATIENT
Start: 2020-06-02 | End: 2020-08-24

## 2020-06-03 ENCOUNTER — TELEMEDICINE (OUTPATIENT)
Dept: INTERNAL MEDICINE CLINIC | Facility: CLINIC | Age: 70
End: 2020-06-03

## 2020-06-03 ENCOUNTER — TELEPHONE (OUTPATIENT)
Dept: INTERNAL MEDICINE CLINIC | Facility: CLINIC | Age: 70
End: 2020-06-03

## 2020-06-03 DIAGNOSIS — Z12.5 SCREENING FOR MALIGNANT NEOPLASM OF PROSTATE: ICD-10-CM

## 2020-06-03 DIAGNOSIS — E78.49 OTHER HYPERLIPIDEMIA: ICD-10-CM

## 2020-06-03 DIAGNOSIS — E11.9 TYPE 2 DIABETES MELLITUS WITHOUT COMPLICATION, WITHOUT LONG-TERM CURRENT USE OF INSULIN (HCC): ICD-10-CM

## 2020-06-03 DIAGNOSIS — I10 ESSENTIAL HYPERTENSION: ICD-10-CM

## 2020-06-03 DIAGNOSIS — Z00.00 ROUTINE GENERAL MEDICAL EXAMINATION AT A HEALTH CARE FACILITY: Primary | ICD-10-CM

## 2020-06-03 DIAGNOSIS — M54.42 ACUTE LEFT-SIDED LOW BACK PAIN WITH LEFT-SIDED SCIATICA: Primary | ICD-10-CM

## 2020-06-03 PROCEDURE — 99442 PHONE E/M BY PHYS 11-20 MIN: CPT | Performed by: INTERNAL MEDICINE

## 2020-06-03 RX ORDER — CYCLOBENZAPRINE HCL 10 MG
TABLET ORAL
Qty: 10 TABLET | Refills: 0 | Status: SHIPPED | OUTPATIENT
Start: 2020-06-03 | End: 2020-08-20

## 2020-06-03 RX ORDER — METHYLPREDNISOLONE 4 MG/1
TABLET ORAL
Qty: 1 KIT | Refills: 0 | Status: SHIPPED | OUTPATIENT
Start: 2020-06-03 | End: 2020-08-20

## 2020-06-03 NOTE — TELEPHONE ENCOUNTER
Future Appointments   Date Time Provider Bart Sarmiento   8/24/2020  8:45 AM Lucrecia Nettles MD [facility]   6/1/2021  1:20 PM Estrella Patel MD [facility]  [facility]     Pt has  with JL      [facility]

## 2020-06-03 NOTE — PROGRESS NOTES
HPI:    Patient ID: Deshaun Cole is a 72year old male.     HPI  Telephone  call with 2 way communication done for the pts complaints of back pain, attempted video call but pt could not do on his end, lifted heavy object 2 weeks ago, left low back pain w NAC) 6-2-600 MG Oral Tab Take 1 tablet by mouth daily. • Omega-3 Fatty Acids (FISH OIL) 600 MG Oral Cap Take  by mouth. • aspirin 81 MG Oral Chew Tab Chew 81 mg by mouth daily. Allergies:   Other                   Atha Favors    Comment:UV

## 2020-06-03 NOTE — TELEPHONE ENCOUNTER
Future Appointments   Date Time Provider Bart Sarmiento   6/3/2020  6:00 PM Elaine Her MD [facility]   8/24/2020  8:45 AM Elaine Her MD [facility]   6/1/2021  1:20 PM Viktor Sherman MD [facility]  [facility]

## 2020-06-10 RX ORDER — DAPAGLIFLOZIN 10 MG/1
TABLET, FILM COATED ORAL
Qty: 90 TABLET | Refills: 0 | Status: SHIPPED | OUTPATIENT
Start: 2020-06-10 | End: 2020-09-01

## 2020-06-15 ENCOUNTER — OFFICE VISIT (OUTPATIENT)
Dept: PHYSICAL THERAPY | Facility: HOSPITAL | Age: 70
End: 2020-06-15
Attending: INTERNAL MEDICINE
Payer: MEDICARE

## 2020-06-15 DIAGNOSIS — M54.42 ACUTE LEFT-SIDED LOW BACK PAIN WITH LEFT-SIDED SCIATICA: ICD-10-CM

## 2020-06-15 PROCEDURE — 97110 THERAPEUTIC EXERCISES: CPT

## 2020-06-15 PROCEDURE — 97161 PT EVAL LOW COMPLEX 20 MIN: CPT

## 2020-06-15 NOTE — PROGRESS NOTES
SPINE EVALUATION:   Referring Physician: Dr. Bashir Harrison  Diagnosis: Acute left-sided low back pain with left-sided sciatica (M54.42)     Date of Service: 6/15/2020     PATIENT SUMMARY   Koffi Aleman is a 72year old male who presents to therapy today wi presents to physical therapy evaluation with primary c/o left buttock with radiation to left proximal hamstring and lateral ankle.  The results of the objective tests and measures show mild lateral shift right, increased pain with lumbar flexion and side-be restrictions  Hamstrings: R moderate restrictions; L moderate restrictions  Piriformis: R moderate to mild restrictions; L moderate restrictions  Quads: R moderate restrictions; L moderate restrictons  Gastroc-soleus: R moderate restrictions; L moderate re Complexity decision making   PLAN OF CARE:    Goals: (to be met in 8 visits)   · Pt will improve transversus abdominis recruitment to perform proper isometric contraction without requiring verbal or tactile cuing to promote advancement of therex   · Pt an From: 6/15/2020  To:9/13/2020

## 2020-06-17 NOTE — TELEPHONE ENCOUNTER
PASSED per protocol, refill sent.   Last PE 10.29.18-Overdue, Medicare annual scheduled for 8.24.20   Future Appointments   Date Time Provider Bart Sarmiento   6/19/2020 11:30 AM Jhoan Falk, PT [facility] AT [facility]   6/24/2020 11:30 AM Janey Calderon,

## 2020-06-19 ENCOUNTER — OFFICE VISIT (OUTPATIENT)
Dept: PHYSICAL THERAPY | Facility: HOSPITAL | Age: 70
End: 2020-06-19
Attending: INTERNAL MEDICINE
Payer: MEDICARE

## 2020-06-19 PROCEDURE — 97110 THERAPEUTIC EXERCISES: CPT

## 2020-06-19 PROCEDURE — 97140 MANUAL THERAPY 1/> REGIONS: CPT

## 2020-06-19 NOTE — PROGRESS NOTES
Dx:  Acute left-sided low back pain with left-sided sciatica (M54.42)          Insurance (Authorized # of Visits):  8 per POC           Authorizing Physician: Dr. Luis Felipe  Next MD visit: none scheduled  Fall Risk: standard         Precautions: n/a demonstrate improved core strength to be able to perform lifting and carrying without pain   · Pt will be independent and compliant with comprehensive HEP to maintain progress achieved in PT       Plan: long axis traction; lumbar spine mobilization; hip st

## 2020-06-23 RX ORDER — NATEGLINIDE 60 MG/1
TABLET, COATED ORAL
Qty: 90 TABLET | Refills: 0 | Status: SHIPPED | OUTPATIENT
Start: 2020-06-23 | End: 2020-09-22

## 2020-06-24 ENCOUNTER — OFFICE VISIT (OUTPATIENT)
Dept: PHYSICAL THERAPY | Facility: HOSPITAL | Age: 70
End: 2020-06-24
Attending: INTERNAL MEDICINE
Payer: MEDICARE

## 2020-06-24 PROCEDURE — 97110 THERAPEUTIC EXERCISES: CPT

## 2020-06-24 PROCEDURE — 97140 MANUAL THERAPY 1/> REGIONS: CPT

## 2020-06-24 NOTE — PROGRESS NOTES
Dx:  Acute left-sided low back pain with left-sided sciatica (M54.42)          Insurance (Authorized # of Visits):  8 per POC           Authorizing Physician: Dr. Ning Doyle  Next MD visit: none scheduled  Fall Risk: standard         Precautions: n/a pain-free sit to stand transfers.   · Pt will demonstrate improved core strength to be able to perform lifting and carrying without pain   · Pt will be independent and compliant with comprehensive HEP to maintain progress achieved in PT       Plan: long axi

## 2020-06-26 ENCOUNTER — OFFICE VISIT (OUTPATIENT)
Dept: PHYSICAL THERAPY | Facility: HOSPITAL | Age: 70
End: 2020-06-26
Attending: INTERNAL MEDICINE
Payer: MEDICARE

## 2020-06-26 PROCEDURE — 97110 THERAPEUTIC EXERCISES: CPT

## 2020-06-26 PROCEDURE — 97140 MANUAL THERAPY 1/> REGIONS: CPT

## 2020-06-26 NOTE — PROGRESS NOTES
Dx:  Acute left-sided low back pain with left-sided sciatica (M54.42)          Insurance (Authorized # of Visits):  8 per POC           Authorizing Physician: Dr. Britta Linda  Next MD visit: none scheduled  Fall Risk: standard         Precautions: n/a transfers.   · Pt will demonstrate improved core strength to be able to perform lifting and carrying without pain   · Pt will be independent and compliant with comprehensive HEP to maintain progress achieved in PT       Plan: long axis traction; lumbar spin

## 2020-07-01 ENCOUNTER — OFFICE VISIT (OUTPATIENT)
Dept: PHYSICAL THERAPY | Facility: HOSPITAL | Age: 70
End: 2020-07-01
Attending: INTERNAL MEDICINE
Payer: MEDICARE

## 2020-07-01 PROCEDURE — 97112 NEUROMUSCULAR REEDUCATION: CPT

## 2020-07-01 PROCEDURE — 97140 MANUAL THERAPY 1/> REGIONS: CPT

## 2020-07-01 PROCEDURE — 97110 THERAPEUTIC EXERCISES: CPT

## 2020-07-01 NOTE — PROGRESS NOTES
Dx:  Acute left-sided low back pain with left-sided sciatica (M54.42)          Insurance (Authorized # of Visits):  8 per POC           Authorizing Physician: Dr. Estefanía Freedman  Next MD visit: none scheduled  Fall Risk: standard         Precautions: n/a ease with bending forward to don shoes   · Pt will report improved symptom centralization and absence of radicular symptoms for 3 consecutive days to improve function with ADL   · Pt will have decreased paraspinal mm tension to facilitate pain-free sit to L    Bridge x 10  Camel cat x 10     Quadruped TrA with manual cuing  Rocking back in quadruped with cuing for straight back x 15    L sciatic nerve glides  Prone L4/5 and L5/S1 PA glides  Prone on elbows (decreased pain)  Prone press ups 3 x 10 (decreased

## 2020-07-03 ENCOUNTER — OFFICE VISIT (OUTPATIENT)
Dept: PHYSICAL THERAPY | Facility: HOSPITAL | Age: 70
End: 2020-07-03
Attending: INTERNAL MEDICINE
Payer: MEDICARE

## 2020-07-03 PROCEDURE — 97110 THERAPEUTIC EXERCISES: CPT

## 2020-07-03 PROCEDURE — 97140 MANUAL THERAPY 1/> REGIONS: CPT

## 2020-07-03 PROCEDURE — 97112 NEUROMUSCULAR REEDUCATION: CPT

## 2020-07-03 NOTE — PROGRESS NOTES
Dx:  Acute left-sided low back pain with left-sided sciatica (M54.42)          Insurance (Authorized # of Visits):  8 per POC           Authorizing Physician: Dr. Joanne Paige  Next MD visit: none scheduled  Fall Risk: standard         Precautions: n/a training  Date: 6/19/2020  TX#: 2/8 Date: 6/24/2020             TX#: 3/8 Date: 6/26/2020             TX#: 4/8 Date: 7/1/2020              TX#: 5/ Date: 7/3/2020  Tx#: 6/8   L long axis traction 10 x 15 sec   repeated x2 L long axis traction 10 x 15 sec L l 3 x 10 (decreased pain)  Sit to stand form correction 1 x 7   Gait training L sciatic nerve glides  Prone L4/5 and L5/S1 PA glides  Prone press ups 2 x 10   Sit to stand form correction 1 x 10     Squats with hand support x 12 (L-sided pain)   Prone L4/5 a

## 2020-07-08 ENCOUNTER — OFFICE VISIT (OUTPATIENT)
Dept: PHYSICAL THERAPY | Facility: HOSPITAL | Age: 70
End: 2020-07-08
Attending: INTERNAL MEDICINE
Payer: MEDICARE

## 2020-07-08 PROCEDURE — 97140 MANUAL THERAPY 1/> REGIONS: CPT

## 2020-07-08 PROCEDURE — 97110 THERAPEUTIC EXERCISES: CPT

## 2020-07-08 NOTE — PROGRESS NOTES
Dx:  Acute left-sided low back pain with left-sided sciatica (M54.42)          Insurance (Authorized # of Visits):  8 per POC           Authorizing Physician: Dr. Richard Lindsey  Next MD visit: none scheduled  Fall Risk: standard         Precautions: n/a carrying without pain   · Pt will be independent and compliant with comprehensive HEP to maintain progress achieved in PT       Plan: re-assess; continued participation in PT is recommended  Date: 6/19/2020  TX#: 2/8 Date: 6/24/2020             TX#: 3/8 Da (\"relaxing\")  X 2  Open book x 10, L  X 2  (improved range with lumbar flexion; decreased pain) Sdly L lumbar rotational mobilization Gr III   Open book x 10, L    L quad stretch in prone 30 sec x 3  Camel cat x 10 (pain with lumbar flexion)     Clam she

## 2020-07-10 ENCOUNTER — OFFICE VISIT (OUTPATIENT)
Dept: PHYSICAL THERAPY | Facility: HOSPITAL | Age: 70
End: 2020-07-10
Attending: INTERNAL MEDICINE
Payer: MEDICARE

## 2020-07-10 PROCEDURE — 97140 MANUAL THERAPY 1/> REGIONS: CPT

## 2020-07-10 PROCEDURE — 97110 THERAPEUTIC EXERCISES: CPT

## 2020-07-10 NOTE — PROGRESS NOTES
Progress Summary  Pt has attended 8 visits in Physical Therapy. Subjective: Reports about 20% improvement in his symptoms. Takes Ibuprofen 2-3x/day vs 5-6x/day. Still with pain in L posterior hip but able to stand and walk taller.  No pain with sittin Hip Flexor: R moderate restrictions, L moderate restrictions  Hamstrings: R moderate restrictions; L moderate restrictions  Piriformis: R  mild restrictions; L  mild restrictions  Quads: R moderate restrictions; L moderate restrictons R >L  Gastroc-soleu mechanics and decrease pain;  Therapeutic exercises including ROM, strengthening, stretching program; neuromuscular re-education for posture/scapular training; core and pelvic strengthening and stabilization training; Pt. education for posture, body mechani flexion manual stretch   L hip caudal glide via femur with lower leg on PT's shoulder Gr III 2 x 10   Bridge 2 x 10 L hip flexion manual stretch   L hip caudal glide via femur with lower leg on PT's shoulder Gr III 2 x 10     Sdly L lumbar rotational mobil stand form correction 1 x 7   Gait training L sciatic nerve glides  Prone L4/5 and L5/S1 PA glides  Prone press ups 2 x 10   Sit to stand form correction 1 x 10     Squats with hand support x 12 (L-sided pain)   Prone L4/5 and L5/S1 PA glides  Prone press

## 2020-07-17 ENCOUNTER — OFFICE VISIT (OUTPATIENT)
Dept: PHYSICAL THERAPY | Facility: HOSPITAL | Age: 70
End: 2020-07-17
Attending: INTERNAL MEDICINE
Payer: MEDICARE

## 2020-07-17 PROCEDURE — 97110 THERAPEUTIC EXERCISES: CPT

## 2020-07-17 PROCEDURE — 97112 NEUROMUSCULAR REEDUCATION: CPT

## 2020-07-17 PROCEDURE — 97140 MANUAL THERAPY 1/> REGIONS: CPT

## 2020-07-17 NOTE — PROGRESS NOTES
Dx:  Acute left-sided low back pain with left-sided sciatica (M54.42)          Insurance (Authorized # of Visits):  8 + 8 per POC           Authorizing Physician: Dr. Eli Mendoza  Next MD visit: none scheduled  Fall Risk: standard         Precautions: n/a sit to stand transfers. Progressing  · Pt will demonstrate improved core strength to be able to perform lifting and carrying without pain. Progressing  · Pt will be independent and compliant with comprehensive HEP to maintain progress achieved in PT.  Progr extension x 20 reps (repeated 3 times throughout the session) Prone PA mobs L4/5 and L5/S1 Gr III with hips shift R  RUDY with hips shift R  Prone press ups 2 x 10 with hips shift R  (improved standing posture, no change with lumbar flexion)  Lumbar flexion glides  Prone press ups 2 x 10    Prone L4/5 and L5/S1 PA glides  Prone press ups 2 x 10    L quad/hip flexors stretch  Sit to stand with form correction x 7  Supported squats x 10 Prone L4/5 and L5/S1 PA glides Gr III 4 x 10 bouts  Prone press ups 2 x 10

## 2020-07-22 ENCOUNTER — OFFICE VISIT (OUTPATIENT)
Dept: PHYSICAL THERAPY | Facility: HOSPITAL | Age: 70
End: 2020-07-22
Attending: INTERNAL MEDICINE
Payer: MEDICARE

## 2020-07-22 PROCEDURE — 97110 THERAPEUTIC EXERCISES: CPT

## 2020-07-22 PROCEDURE — 97140 MANUAL THERAPY 1/> REGIONS: CPT

## 2020-07-22 NOTE — PROGRESS NOTES
Dx:  Acute left-sided low back pain with left-sided sciatica (M54.42)          Insurance (Authorized # of Visits):  8 + 8 per POC           Authorizing Physician: Dr. Puja Boston  Next MD visit: none scheduled  Fall Risk: standard         Precautions: n/a Progressing    Plan: L piriformis release; lumbar/thoracic spine mobilization; lumbar ROM; core strengthening; functional mobility and gait  Date: 6/19/2020  TX#: 2/8 Date: 6/24/2020             TX#: 3/8 Date: 6/26/2020             TX#: 4/8 Date: 7/1/2020 stretch 3 x 30 sec    Quadruped rocking back with focus on lower lumbar extension x 20 reps (repeated 3 times throughout the session) Prone PA mobs L4/5 and L5/S1 Gr III with hips shift R  RUDY with hips shift R  Prone press ups 2 x 10 with hips shift R  (i elbows (decreased pain)  Prone press ups 3 x 10 (decreased pain)  Sit to stand form correction 1 x 7   Gait training L sciatic nerve glides  Prone L4/5 and L5/S1 PA glides  Prone press ups 2 x 10   Sit to stand form correction 1 x 10     Squats with hand s 10 reps - 1 sets - 3x daily - 7x weekly       Charges: man x 1 15, there ex x 2     Total Timed Treatment: 45 min  Total Treatment Time: 45 min

## 2020-07-24 ENCOUNTER — OFFICE VISIT (OUTPATIENT)
Dept: PHYSICAL THERAPY | Facility: HOSPITAL | Age: 70
End: 2020-07-24
Attending: INTERNAL MEDICINE
Payer: MEDICARE

## 2020-07-24 PROCEDURE — 97110 THERAPEUTIC EXERCISES: CPT

## 2020-07-24 PROCEDURE — 97112 NEUROMUSCULAR REEDUCATION: CPT

## 2020-07-24 PROCEDURE — 97140 MANUAL THERAPY 1/> REGIONS: CPT

## 2020-07-24 NOTE — PROGRESS NOTES
Dx:  Acute left-sided low back pain with left-sided sciatica (M54.42)          Insurance (Authorized # of Visits):  8 + 8 per POC           Authorizing Physician: Dr. Beka Robles  Next MD visit: none scheduled  Fall Risk: standard         Precautions: n/a Progressing    Plan: Slump glides; lumbar/thoracic spine mobilization; lumbar ROM; core strengthening; functional mobility and gait  Date: 6/19/2020  TX#: 2/8 Date: 6/24/2020             TX#: 3/8 Date: 6/26/2020             TX#: 4/8 Date: 7/1/2020 leg lift; manual cuing for lower abdominal recruitment  TrA with double knee to chest x 3 due to poor control and pain on L L hamstring stretch 3 x 30 sec    Quadruped rocking back with focus on lower lumbar extension x 20 reps (repeated 3 times throughout rocking x 10  To R heel x 10  Central x 10  (B) quad stretch in prone (more restricted on R) Quadruped rocking x 10  To R heel x 10  Central x 10   Supine:   TrA in hook-lying  SKTC with TrA  DKTC with TrA (Not able due to pain)  Heel glides with TrA  Jaycee Connelly - 2 sets - 3x daily - 7x weekly   Supine Sciatic Nerve Glide - 10 reps - 2 sets - 2x daily - 7x weekly   Supine Lower Trunk Rotation - 10 reps - 3 sets - 2x daily - 7x weekly   Supine Bridge - 10 reps - 1 sets - 2x daily - 7x weekly   Hooklying Transversus

## 2020-07-27 ENCOUNTER — APPOINTMENT (OUTPATIENT)
Dept: PHYSICAL THERAPY | Facility: HOSPITAL | Age: 70
End: 2020-07-27
Attending: INTERNAL MEDICINE
Payer: MEDICARE

## 2020-07-29 ENCOUNTER — OFFICE VISIT (OUTPATIENT)
Dept: PHYSICAL THERAPY | Facility: HOSPITAL | Age: 70
End: 2020-07-29
Attending: INTERNAL MEDICINE
Payer: MEDICARE

## 2020-07-29 PROCEDURE — 97110 THERAPEUTIC EXERCISES: CPT

## 2020-07-29 NOTE — PROGRESS NOTES
Progress Summary  Pt has attended 12 visits in Physical Therapy. Subjective:. Patient states that he felt worsening since Friday after doing squat exercises.  He felt worse for three days but that now things are improving although he is still not back transversus abdominis recruitment to perform proper isometric contraction without requiring verbal or tactile cuing to promote advancement of therex.  Not met  · Pt will demonstrate good understanding of proper posture and body mechanics to decrease pain an Date: 7/8/2020  Tx#: 7/8 Date: 7/10/2020  Tx#: 8/8 Date: 7/17/2020  Tx#: 9/16 Date: 7/22/2020  Tx#: 10/16 Date: 7/24/2020  Tx#: 11/16 Date: 7/29/2020  Tx#: 12/16   L long axis traction 10 x 15 sec   repeated x2 L long axis traction 10 x 15 sec L long axis to poor control and pain on L L hamstring stretch 3 x 30 sec    Quadruped rocking back with focus on lower lumbar extension x 20 reps (repeated 3 times throughout the session) Prone PA mobs L4/5 and L5/S1 Gr III with hips shift R  RUDY with hips shift R  Pr Quadruped rocking x 10  To R heel x 10  Central x 10   Supine:   TrA in hook-lying  SKTC with TrA  DKTC with TrA (Not able due to pain)  Heel glides with TrA  Breathing coordination for all  Slump glides, L Bridge x 15 (can go higher, a little bit of pain) Lumbar Extension - 10 reps - 1 sets - 3x daily - 7x weekly   Prone Press Up on Elbows - 10 reps - 2 sets - 3x daily - 7x weekly   Supine Sciatic Nerve Glide - 10 reps - 2 sets - 2x daily - 7x weekly   Supine Lower Trunk Rotation - 10 reps - 3 sets - 2x den

## 2020-07-31 ENCOUNTER — TELEPHONE (OUTPATIENT)
Dept: PHYSICAL THERAPY | Facility: HOSPITAL | Age: 70
End: 2020-07-31

## 2020-07-31 ENCOUNTER — TELEPHONE (OUTPATIENT)
Dept: INTERNAL MEDICINE CLINIC | Facility: CLINIC | Age: 70
End: 2020-07-31

## 2020-07-31 ENCOUNTER — OFFICE VISIT (OUTPATIENT)
Dept: PHYSICAL THERAPY | Facility: HOSPITAL | Age: 70
End: 2020-07-31
Attending: INTERNAL MEDICINE
Payer: MEDICARE

## 2020-07-31 PROCEDURE — 97110 THERAPEUTIC EXERCISES: CPT

## 2020-07-31 NOTE — PROGRESS NOTES
Dx:  Acute left-sided low back pain with left-sided sciatica (M54.42)          Insurance (Authorized # of Visits):  8 + 8 per POC           Authorizing Physician: Dr. Evy Nicholson  Next MD visit: none scheduled  Fall Risk: standard         Precautions: n/a lifting and carrying without pain. Progressing  · Pt will be independent and compliant with comprehensive HEP to maintain progress achieved in PT.  Progressing    Plan: patient asked to call his physician office    Date: 6/19/2020  TX#: 2/8 Date: 6/24/2020 release  L clam shells 3 x 10  L lumbar paraspinals STM (point tenderness T12/L1)   L hip flexion manual stretch   L hip caudal glide via femur with lower leg on PT's shoulder Gr III 2 x 10  L hip flexion manual stretch   L hip caudal glide via femur with 15  Quadruped rocking to L heel x 15  To R heel x 15  Quadruped camel cat x 15 --    Sdly L lumbar rotational mobilization Gr III (\"relaxing\")  X 2  Open book x 10, L  X 2  (improved range with lumbar flexion; decreased pain) Sdly L lumbar rotational mob L/R  (frequent verbal/manual cuing) Abdominal bracing in hkly with breathing coordination  Abdominal breathing in hkly with breathing coordination and SKTC 2 x 10 L/R (continues to exhibit moderate difficulty)        Bridge x 15 Sit to stand x 5 Squats at

## 2020-07-31 NOTE — TELEPHONE ENCOUNTER
Followed up with patient to see if he scheduled an appointment with his physician. Patient states that he is doing much better today and is planning to call to schedule for a live or an e-appointment with Dr. Luis Felipe on Monday.  He states he will attend P

## 2020-07-31 NOTE — TELEPHONE ENCOUNTER
FW: Patient update   Received:  Today   Message Contents   Nabil Bettencourt MD  P [facility] Clinical Staff             Please schedule 15 min apt   Shaji Zheng    Previous Messages      ----- Message -----   From: Dexter Hernández PT   Sent: 7/31/2020   3:05 PM CDT

## 2020-08-03 ENCOUNTER — APPOINTMENT (OUTPATIENT)
Dept: PHYSICAL THERAPY | Facility: HOSPITAL | Age: 70
End: 2020-08-03
Attending: INTERNAL MEDICINE
Payer: MEDICARE

## 2020-08-03 NOTE — TELEPHONE ENCOUNTER
Future Appointments   Date Time Provider Bart Guillermina   8/5/2020  9:30 AM Helena Graves MD [facility]   8/5/2020 11:30 AM Christen Lozano, PT [facility] AT [facility]   8/7/2020 11:30 AM Christen Lozano, PT [facility] AT [facility]   8/1

## 2020-08-05 ENCOUNTER — OFFICE VISIT (OUTPATIENT)
Dept: INTERNAL MEDICINE CLINIC | Facility: CLINIC | Age: 70
End: 2020-08-05
Payer: MEDICARE

## 2020-08-05 ENCOUNTER — OFFICE VISIT (OUTPATIENT)
Dept: PHYSICAL THERAPY | Facility: HOSPITAL | Age: 70
End: 2020-08-05
Attending: INTERNAL MEDICINE
Payer: MEDICARE

## 2020-08-05 VITALS
HEIGHT: 70 IN | WEIGHT: 187 LBS | TEMPERATURE: 98 F | SYSTOLIC BLOOD PRESSURE: 132 MMHG | HEART RATE: 80 BPM | RESPIRATION RATE: 18 BRPM | DIASTOLIC BLOOD PRESSURE: 78 MMHG | BODY MASS INDEX: 26.77 KG/M2

## 2020-08-05 DIAGNOSIS — G89.29 CHRONIC LEFT-SIDED LOW BACK PAIN WITH LEFT-SIDED SCIATICA: Primary | ICD-10-CM

## 2020-08-05 DIAGNOSIS — M54.42 CHRONIC LEFT-SIDED LOW BACK PAIN WITH LEFT-SIDED SCIATICA: Primary | ICD-10-CM

## 2020-08-05 PROCEDURE — 99213 OFFICE O/P EST LOW 20 MIN: CPT | Performed by: INTERNAL MEDICINE

## 2020-08-05 PROCEDURE — 97112 NEUROMUSCULAR REEDUCATION: CPT

## 2020-08-05 PROCEDURE — 97110 THERAPEUTIC EXERCISES: CPT

## 2020-08-05 RX ORDER — NAPROXEN 500 MG/1
500 TABLET ORAL 2 TIMES DAILY WITH MEALS
Qty: 28 TABLET | Refills: 0 | Status: SHIPPED | OUTPATIENT
Start: 2020-08-05 | End: 2020-08-20 | Stop reason: ALTCHOICE

## 2020-08-05 NOTE — PROGRESS NOTES
Dx:  Acute left-sided low back pain with left-sided sciatica (M54.42)          Insurance (Authorized # of Visits):  8 + 8 per POC           Authorizing Physician: Dr. Tasha Urban  Next MD visit: none scheduled  Fall Risk: standard         Precautions: n/a paraspinal mm tension to facilitate pain-free sit to stand transfers. Progressing  · Pt will demonstrate improved core strength to be able to perform lifting and carrying without pain.  Progressing  · Pt will be independent and compliant with comprehensive long axis traction 10 x 10 sec    Re-assessment NU step L5 5' (initial pain that subsided shortly with movement)  Seated L Slump (good range, minimal pain) x 10  Seated R Slump glide (feels more stiffness on L side)  SB rolls DKTC  SB rolls LTR  Sdly L pir right  Camel cat  Quadruped with TrA  Quadruped with left arm raises  Quadruped with R foot extended (toes on the ground) and L arm raises  Less difficulty getting up from the table and improved lumbar flexion ROM Lumbar rotational mobilization in sdly, L, to stand form correction 1 x 7   Gait training L sciatic nerve glides  Prone L4/5 and L5/S1 PA glides  Prone press ups 2 x 10   Sit to stand form correction 1 x 10     Squats with hand support x 12 (L-sided pain)   Prone L4/5 and L5/S1 PA glides  Prone pre - 1x daily - 7x weekly   Quadruped Transversus Abdominis Bracing - 10 reps - 3 sets - 1x daily - 7x weekly   Supine Transversus Abdominis Bracing with Heel Slide - 10 reps - 3 sets - 1x daily - 7x weekly   Supine Transversus Abdominis Bracing with Leg Exte

## 2020-08-05 NOTE — PROGRESS NOTES
HPI:    Patient ID: Cookie Long is a 72year old male.     HPI  Left sided low back pain not getting completely better with PT, now with some possible weakness in left le, states sugars ok, overdue for eye exam, remined to make apt  /78 (BP Locati (FISH OIL) 600 MG Oral Cap Take  by mouth. • aspirin 81 MG Oral Chew Tab Chew 81 mg by mouth daily. • methylPREDNISolone (MEDROL) 4 MG Oral Tablet Therapy Pack As directed.  (Patient not taking: Reported on 8/5/2020 ) 1 kit 0   • cyclobenzaprine 10

## 2020-08-07 ENCOUNTER — HOSPITAL ENCOUNTER (OUTPATIENT)
Dept: MRI IMAGING | Facility: HOSPITAL | Age: 70
Discharge: HOME | End: 2020-08-07
Attending: INTERNAL MEDICINE
Payer: MEDICARE

## 2020-08-07 ENCOUNTER — OFFICE VISIT (OUTPATIENT)
Dept: PHYSICAL THERAPY | Facility: HOSPITAL | Age: 70
End: 2020-08-07
Attending: INTERNAL MEDICINE
Payer: MEDICARE

## 2020-08-07 DIAGNOSIS — G89.29 CHRONIC LEFT-SIDED LOW BACK PAIN WITH LEFT-SIDED SCIATICA: ICD-10-CM

## 2020-08-07 DIAGNOSIS — M54.42 CHRONIC LEFT-SIDED LOW BACK PAIN WITH LEFT-SIDED SCIATICA: ICD-10-CM

## 2020-08-07 PROCEDURE — 97112 NEUROMUSCULAR REEDUCATION: CPT

## 2020-08-07 PROCEDURE — 72148 MRI LUMBAR SPINE W/O DYE: CPT | Performed by: INTERNAL MEDICINE

## 2020-08-07 PROCEDURE — 97110 THERAPEUTIC EXERCISES: CPT

## 2020-08-07 NOTE — PROGRESS NOTES
Dx:  Acute left-sided low back pain with left-sided sciatica (M54.42)          Insurance (Authorized # of Visits):  8 + 8 per POC           Authorizing Physician: Dr. Mikayla Monsalve  Next MD visit: none scheduled  Fall Risk: standard         Precautions: n/a with neural glides, LE strengthening, abdominal strengthening and motor control    Date: 6/19/2020  TX#: 2/8 Date: 6/24/2020             TX#: 3/8 Date: 6/26/2020             TX#: 4/8 Date: 7/1/2020              TX#: 5/ Date: 7/3/2020  Tx#: 6/8 Date: 7/8/20 NU step L5 5' (initial pain that subsided shortly with movement)  Seated L Slump (good range, minimal pain) x 10  Seated R Slump glide (feels more stiffness on L side)  SB rolls DKTC  SB rolls LTR  L clam shells 3 x 10  L lumbar paraspinals STM   L L5/S1 a (toes on the ground) and L arm raises  Less difficulty getting up from the table and improved lumbar flexion ROM Lumbar rotational mobilization in sdly, L, Gr III  Open book x 10  Prone L5/S1 and L4/5 PA mobs Gr III  Prone press ups x 15  Quadruped rocking leg press (1 black cord)   L sciatic nerve glides  Prone L4/5 and L5/S1 PA glides  Prone on elbows (decreased pain)  Prone press ups 3 x 10 (decreased pain)  Sit to stand form correction 1 x 7   Gait training L sciatic nerve glides  Prone L4/5 and L5/S1 PA Press Up on Elbows - 10 reps - 2 sets - 3x daily - 7x weekly   Supine Sciatic Nerve Glide - 10 reps - 2 sets - 2x daily - 7x weekly   Supine Lower Trunk Rotation - 10 reps - 3 sets - 2x daily - 7x weekly   Supine Bridge - 10 reps - 1 sets - 2x daily - 7x w

## 2020-08-12 ENCOUNTER — HOSPITAL ENCOUNTER (OUTPATIENT)
Dept: ULTRASOUND IMAGING | Facility: HOSPITAL | Age: 70
Discharge: HOME | End: 2020-08-12
Attending: PHYSICIAN ASSISTANT
Payer: MEDICARE

## 2020-08-12 ENCOUNTER — OFFICE VISIT (OUTPATIENT)
Dept: PHYSICAL THERAPY | Facility: HOSPITAL | Age: 70
End: 2020-08-12
Attending: INTERNAL MEDICINE
Payer: MEDICARE

## 2020-08-12 ENCOUNTER — OFFICE VISIT (OUTPATIENT)
Dept: INTERNAL MEDICINE CLINIC | Facility: CLINIC | Age: 70
End: 2020-08-12
Payer: MEDICARE

## 2020-08-12 VITALS
WEIGHT: 187 LBS | HEART RATE: 88 BPM | TEMPERATURE: 98 F | BODY MASS INDEX: 27 KG/M2 | DIASTOLIC BLOOD PRESSURE: 80 MMHG | SYSTOLIC BLOOD PRESSURE: 158 MMHG

## 2020-08-12 DIAGNOSIS — M54.42 CHRONIC LEFT-SIDED LOW BACK PAIN WITH LEFT-SIDED SCIATICA: ICD-10-CM

## 2020-08-12 DIAGNOSIS — M79.89 LEFT LEG SWELLING: ICD-10-CM

## 2020-08-12 DIAGNOSIS — M79.89 RIGHT LEG SWELLING: Primary | ICD-10-CM

## 2020-08-12 DIAGNOSIS — G89.29 CHRONIC LEFT-SIDED LOW BACK PAIN WITH LEFT-SIDED SCIATICA: ICD-10-CM

## 2020-08-12 PROCEDURE — 93971 EXTREMITY STUDY: CPT | Performed by: PHYSICIAN ASSISTANT

## 2020-08-12 PROCEDURE — 99214 OFFICE O/P EST MOD 30 MIN: CPT | Performed by: PHYSICIAN ASSISTANT

## 2020-08-12 NOTE — PROGRESS NOTES
Dx:  Acute left-sided low back pain with left-sided sciatica (M54.42)          Insurance (Authorized # of Visits):  8 + 8 per POC           Authorizing Physician: Dr. Karishma Goodman  Next MD visit: none scheduled  Fall Risk: standard         Precautions: n/a

## 2020-08-12 NOTE — PROGRESS NOTES
Patient presents with:  Swelling: AJ rm 2 right leg swelling x 2-3 weeks  Leg Pain: upper leg under buttocks x 4 months      HPI:  Pt presents with c/o R leg swelling worsening over the last 2 weeks and persistent L leg pain as well as low back pain despit Routine general medical examination at a health care facility      Current Outpatient Medications   Medication Sig Dispense Refill   • naproxen 500 MG Oral Tab Take 1 tablet (500 mg total) by mouth 2 (two) times daily with meals.  28 tablet 0   • NATEGLINID regular rhythm. No murmur, rubs or gallops. Pulmonary/Chest: Effort normal and breath sounds normal. No respiratory distress. No wheezes, rhonchi or rales  Ext:  R LE:  +1 to +2 pitting edema to knee with trace pitting edema above knee.     R knee:  Neg

## 2020-08-13 NOTE — PROGRESS NOTES
LE doppler negative for DVT. Pt should have been notified by on-call provider. Pt should elevate leg. At this time suspicion would be for some orthopedic cause for swelling in the R leg (ms tear vs Bakers cyst vs other).   Pt should elevated leg when abl

## 2020-08-14 ENCOUNTER — APPOINTMENT (OUTPATIENT)
Dept: PHYSICAL THERAPY | Facility: HOSPITAL | Age: 70
End: 2020-08-14
Attending: INTERNAL MEDICINE
Payer: MEDICARE

## 2020-08-17 ENCOUNTER — LAB ENCOUNTER (OUTPATIENT)
Dept: LAB | Facility: HOSPITAL | Age: 70
End: 2020-08-17
Attending: INTERNAL MEDICINE
Payer: MEDICARE

## 2020-08-17 DIAGNOSIS — I10 ESSENTIAL HYPERTENSION: ICD-10-CM

## 2020-08-17 DIAGNOSIS — E78.49 OTHER HYPERLIPIDEMIA: ICD-10-CM

## 2020-08-17 DIAGNOSIS — E11.9 TYPE 2 DIABETES MELLITUS WITHOUT COMPLICATION, WITHOUT LONG-TERM CURRENT USE OF INSULIN (HCC): ICD-10-CM

## 2020-08-17 DIAGNOSIS — Z12.5 SCREENING FOR MALIGNANT NEOPLASM OF PROSTATE: ICD-10-CM

## 2020-08-17 DIAGNOSIS — Z00.00 ROUTINE GENERAL MEDICAL EXAMINATION AT A HEALTH CARE FACILITY: ICD-10-CM

## 2020-08-17 LAB
ALBUMIN SERPL-MCNC: 4 G/DL (ref 3.4–5)
ALBUMIN/GLOB SERPL: 1 {RATIO} (ref 1–2)
ALP LIVER SERPL-CCNC: 33 U/L (ref 45–117)
ALT SERPL-CCNC: 27 U/L (ref 16–61)
ANION GAP SERPL CALC-SCNC: 1 MMOL/L (ref 0–18)
AST SERPL-CCNC: 12 U/L (ref 15–37)
BASOPHILS # BLD AUTO: 0.04 X10(3) UL (ref 0–0.2)
BASOPHILS NFR BLD AUTO: 0.6 %
BILIRUB SERPL-MCNC: 0.6 MG/DL (ref 0.1–2)
BUN BLD-MCNC: 17 MG/DL (ref 7–18)
BUN/CREAT SERPL: 19.8 (ref 10–20)
CALCIUM BLD-MCNC: 9 MG/DL (ref 8.5–10.1)
CHLORIDE SERPL-SCNC: 105 MMOL/L (ref 98–112)
CHOLEST SMN-MCNC: 171 MG/DL (ref ?–200)
CO2 SERPL-SCNC: 32 MMOL/L (ref 21–32)
COMPLEXED PSA SERPL-MCNC: 1.59 NG/ML (ref ?–4)
CREAT BLD-MCNC: 0.86 MG/DL (ref 0.7–1.3)
DEPRECATED RDW RBC AUTO: 43.8 FL (ref 35.1–46.3)
EOSINOPHIL # BLD AUTO: 0.12 X10(3) UL (ref 0–0.7)
EOSINOPHIL NFR BLD AUTO: 1.7 %
ERYTHROCYTE [DISTWIDTH] IN BLOOD BY AUTOMATED COUNT: 12.6 % (ref 11–15)
EST. AVERAGE GLUCOSE BLD GHB EST-MCNC: 143 MG/DL (ref 68–126)
GLOBULIN PLAS-MCNC: 3.9 G/DL (ref 2.8–4.4)
GLUCOSE BLD-MCNC: 136 MG/DL (ref 70–99)
HBA1C MFR BLD HPLC: 6.6 % (ref ?–5.7)
HCT VFR BLD AUTO: 47.5 % (ref 39–53)
HDLC SERPL-MCNC: 45 MG/DL (ref 40–59)
HGB BLD-MCNC: 15 G/DL (ref 13–17.5)
IMM GRANULOCYTES # BLD AUTO: 0.01 X10(3) UL (ref 0–1)
IMM GRANULOCYTES NFR BLD: 0.1 %
LDLC SERPL CALC-MCNC: 101 MG/DL (ref ?–100)
LYMPHOCYTES # BLD AUTO: 2.43 X10(3) UL (ref 1–4)
LYMPHOCYTES NFR BLD AUTO: 34.9 %
M PROTEIN MFR SERPL ELPH: 7.9 G/DL (ref 6.4–8.2)
MCH RBC QN AUTO: 29.8 PG (ref 26–34)
MCHC RBC AUTO-ENTMCNC: 31.6 G/DL (ref 31–37)
MCV RBC AUTO: 94.4 FL (ref 80–100)
MONOCYTES # BLD AUTO: 0.72 X10(3) UL (ref 0.1–1)
MONOCYTES NFR BLD AUTO: 10.3 %
NEUTROPHILS # BLD AUTO: 3.64 X10 (3) UL (ref 1.5–7.7)
NEUTROPHILS # BLD AUTO: 3.64 X10(3) UL (ref 1.5–7.7)
NEUTROPHILS NFR BLD AUTO: 52.4 %
NONHDLC SERPL-MCNC: 126 MG/DL (ref ?–130)
OSMOLALITY SERPL CALC.SUM OF ELEC: 290 MOSM/KG (ref 275–295)
PATIENT FASTING Y/N/NP: YES
PATIENT FASTING Y/N/NP: YES
PLATELET # BLD AUTO: 283 10(3)UL (ref 150–450)
POTASSIUM SERPL-SCNC: 4.2 MMOL/L (ref 3.5–5.1)
RBC # BLD AUTO: 5.03 X10(6)UL (ref 3.8–5.8)
SODIUM SERPL-SCNC: 138 MMOL/L (ref 136–145)
TRIGL SERPL-MCNC: 124 MG/DL (ref 30–149)
VLDLC SERPL CALC-MCNC: 25 MG/DL (ref 0–30)
WBC # BLD AUTO: 7 X10(3) UL (ref 4–11)

## 2020-08-17 PROCEDURE — 85025 COMPLETE CBC W/AUTO DIFF WBC: CPT

## 2020-08-17 PROCEDURE — 83036 HEMOGLOBIN GLYCOSYLATED A1C: CPT

## 2020-08-17 PROCEDURE — 36415 COLL VENOUS BLD VENIPUNCTURE: CPT

## 2020-08-17 PROCEDURE — 80061 LIPID PANEL: CPT

## 2020-08-17 PROCEDURE — 80053 COMPREHEN METABOLIC PANEL: CPT

## 2020-08-20 ENCOUNTER — OFFICE VISIT (OUTPATIENT)
Dept: INTERNAL MEDICINE CLINIC | Facility: CLINIC | Age: 70
End: 2020-08-20
Payer: MEDICARE

## 2020-08-20 VITALS
RESPIRATION RATE: 16 BRPM | TEMPERATURE: 97 F | DIASTOLIC BLOOD PRESSURE: 84 MMHG | WEIGHT: 183 LBS | BODY MASS INDEX: 26 KG/M2 | HEART RATE: 80 BPM | SYSTOLIC BLOOD PRESSURE: 138 MMHG

## 2020-08-20 DIAGNOSIS — M79.89 RIGHT LEG SWELLING: Primary | ICD-10-CM

## 2020-08-20 DIAGNOSIS — M54.42 CHRONIC LEFT-SIDED LOW BACK PAIN WITH LEFT-SIDED SCIATICA: ICD-10-CM

## 2020-08-20 DIAGNOSIS — G89.29 CHRONIC LEFT-SIDED LOW BACK PAIN WITH LEFT-SIDED SCIATICA: ICD-10-CM

## 2020-08-20 PROCEDURE — 99213 OFFICE O/P EST LOW 20 MIN: CPT | Performed by: PHYSICIAN ASSISTANT

## 2020-08-20 RX ORDER — CIPROFLOXACIN 500 MG/1
1 TABLET, FILM COATED ORAL 3 TIMES DAILY
COMMUNITY
Start: 2020-08-16 | End: 2020-10-06 | Stop reason: ALTCHOICE

## 2020-08-20 RX ORDER — AMOXICILLIN 500 MG/1
1 CAPSULE ORAL 2 TIMES DAILY
COMMUNITY
Start: 2020-08-16 | End: 2020-10-06 | Stop reason: ALTCHOICE

## 2020-08-20 NOTE — PROGRESS NOTES
Patient presents with:  Leg Swelling: SN Rm 6; R leg swelling, notes improvement. Treated with ABX Amox & Cipro      HPI:   Pt presents for follow up of his R leg swelling. He had state LE doppler last week to r/o DVT. There was no DVT noted on his US. at a health care facility      Current Outpatient Medications   Medication Sig Dispense Refill   • amoxicillin 500 MG Oral Cap Take 1 capsule by mouth 2 (two) times a day.      • Ciprofloxacin HCl 500 MG Oral Tab Take 1 tablet by mouth 3 (three) times daily knee.  No erythema or warmth. No calf pain. + swelling to R knee, posteriorly as well. Skin: Skin is warm and dry.  Red papules noted to R lower leg, unchanged from prior exam.      A/P:    Right leg swelling  (primary encounter diagnosis) - DVT ruled ou

## 2020-08-24 ENCOUNTER — OFFICE VISIT (OUTPATIENT)
Dept: INTERNAL MEDICINE CLINIC | Facility: CLINIC | Age: 70
End: 2020-08-24
Payer: MEDICARE

## 2020-08-24 VITALS
TEMPERATURE: 97 F | HEART RATE: 78 BPM | HEIGHT: 69.69 IN | SYSTOLIC BLOOD PRESSURE: 128 MMHG | RESPIRATION RATE: 16 BRPM | OXYGEN SATURATION: 98 % | BODY MASS INDEX: 26.06 KG/M2 | DIASTOLIC BLOOD PRESSURE: 76 MMHG | WEIGHT: 180 LBS

## 2020-08-24 DIAGNOSIS — M54.41 CHRONIC BILATERAL LOW BACK PAIN WITH BILATERAL SCIATICA: ICD-10-CM

## 2020-08-24 DIAGNOSIS — E11.9 TYPE 2 DIABETES MELLITUS WITHOUT COMPLICATION, WITHOUT LONG-TERM CURRENT USE OF INSULIN (HCC): ICD-10-CM

## 2020-08-24 DIAGNOSIS — I10 ESSENTIAL HYPERTENSION: ICD-10-CM

## 2020-08-24 DIAGNOSIS — R21 RASH: ICD-10-CM

## 2020-08-24 DIAGNOSIS — G47.33 OSA (OBSTRUCTIVE SLEEP APNEA): ICD-10-CM

## 2020-08-24 DIAGNOSIS — Z00.00 ROUTINE GENERAL MEDICAL EXAMINATION AT A HEALTH CARE FACILITY: ICD-10-CM

## 2020-08-24 DIAGNOSIS — M54.42 CHRONIC BILATERAL LOW BACK PAIN WITH BILATERAL SCIATICA: ICD-10-CM

## 2020-08-24 DIAGNOSIS — R35.1 NOCTURIA: ICD-10-CM

## 2020-08-24 DIAGNOSIS — G89.29 CHRONIC BILATERAL LOW BACK PAIN WITH BILATERAL SCIATICA: ICD-10-CM

## 2020-08-24 DIAGNOSIS — R60.0 EDEMA OF RIGHT LOWER EXTREMITY: ICD-10-CM

## 2020-08-24 DIAGNOSIS — E78.49 OTHER HYPERLIPIDEMIA: Primary | ICD-10-CM

## 2020-08-24 PROCEDURE — 99213 OFFICE O/P EST LOW 20 MIN: CPT | Performed by: INTERNAL MEDICINE

## 2020-08-24 PROCEDURE — G0439 PPPS, SUBSEQ VISIT: HCPCS | Performed by: INTERNAL MEDICINE

## 2020-08-24 RX ORDER — ATORVASTATIN CALCIUM 10 MG/1
TABLET, FILM COATED ORAL
Qty: 90 TABLET | Refills: 1 | Status: SHIPPED | OUTPATIENT
Start: 2020-08-24 | End: 2021-03-22

## 2020-08-25 ENCOUNTER — TELEPHONE (OUTPATIENT)
Dept: INTERNAL MEDICINE CLINIC | Facility: CLINIC | Age: 70
End: 2020-08-25

## 2020-08-25 NOTE — TELEPHONE ENCOUNTER
KRYSTIAN referred pt to Dr. Aftab Nayak and they have no openings until November-please advise    KRYSTIAN gave rx for naproxen 500 MG Oral Tab on 8/5/20 and he is done but still having issues with his legs and would like a refill

## 2020-08-26 RX ORDER — LISINOPRIL 10 MG/1
TABLET ORAL
Qty: 90 TABLET | Refills: 1 | Status: SHIPPED | OUTPATIENT
Start: 2020-08-26 | End: 2020-09-30

## 2020-08-26 NOTE — TELEPHONE ENCOUNTER
PASSED per protocol, refill sent.   Last PE 8.24.20   Future Appointments   Date Time Provider Bart Sarmiento   8/31/2020  4:30 PM [address] MAIN [facility] [address] [facility] AT [facility]   9/1/2020 11:00 AM DO NIKKY [facility]   9/30/2020  1:15 PM Savita Anderson

## 2020-08-27 NOTE — TELEPHONE ENCOUNTER
Patient concerned about 3 things:  1. Pt states his left leg pain was better when he was taking the Naproxen 500mg BID for 14 days and would like a refill. Pt states he has been taking Ibuprofen, pain is tolerable but the Naproxen works better.   [facility]

## 2020-08-27 NOTE — PROGRESS NOTES
HPI:    Patient ID: Kaye Bertrand is a 72year old male.     HPI  Pt here for awv and with multiple complaints, again, wife treating him for issue, he says she dx him with right le cellulitis and he is taking abx, right greater than Left le edema, dm, adalid Tyvdkyzqc-Lgmgftfxn-Avskloarge (CEREFOLIN NAC) 6-2-600 MG Oral Tab Take 1 tablet by mouth daily. • Omega-3 Fatty Acids (FISH OIL) 600 MG Oral Cap Take  by mouth. • aspirin 81 MG Oral Chew Tab Chew 81 mg by mouth daily.      • LISINOPRIL 10 MG Oral Nocturia-follow  Rash-william derm  Edema of right lower extremity-ct abd  Chronic bilateral low back pain with bilateral sciatica-to physiatry  Fu in 4-6 weeks    No orders of the defined types were placed in this encounter.       Meds This Visit:  Requested

## 2020-08-28 NOTE — TELEPHONE ENCOUNTER
Referral updated, and information given to patient. Patient will try tylenol for pain. Patient verbalized understanding of instructions for metformin.

## 2020-08-31 ENCOUNTER — HOSPITAL ENCOUNTER (OUTPATIENT)
Dept: CT IMAGING | Facility: HOSPITAL | Age: 70
Discharge: HOME | End: 2020-08-31
Attending: INTERNAL MEDICINE
Payer: MEDICARE

## 2020-08-31 DIAGNOSIS — R60.0 EDEMA OF RIGHT LOWER EXTREMITY: ICD-10-CM

## 2020-08-31 PROCEDURE — 74177 CT ABD & PELVIS W/CONTRAST: CPT | Performed by: INTERNAL MEDICINE

## 2020-09-01 RX ORDER — DAPAGLIFLOZIN 10 MG/1
TABLET, FILM COATED ORAL
Qty: 90 TABLET | Refills: 0 | Status: SHIPPED | OUTPATIENT
Start: 2020-09-01 | End: 2020-12-21

## 2020-09-01 RX ORDER — BLOOD SUGAR DIAGNOSTIC
STRIP MISCELLANEOUS
Qty: 100 STRIP | Refills: 2 | Status: SHIPPED | OUTPATIENT
Start: 2020-09-01 | End: 2021-07-26

## 2020-09-02 ENCOUNTER — LAB ENCOUNTER (OUTPATIENT)
Dept: LAB | Age: 70
End: 2020-09-02
Attending: INTERNAL MEDICINE
Payer: MEDICARE

## 2020-09-02 DIAGNOSIS — N32.89 BLADDER WALL THICKENING: ICD-10-CM

## 2020-09-02 LAB
BILIRUB UR QL STRIP.AUTO: NEGATIVE
CLARITY UR REFRACT.AUTO: CLEAR
COLOR UR AUTO: YELLOW
GLUCOSE UR STRIP.AUTO-MCNC: >=500 MG/DL
KETONES UR STRIP.AUTO-MCNC: NEGATIVE MG/DL
LEUKOCYTE ESTERASE UR QL STRIP.AUTO: NEGATIVE
NITRITE UR QL STRIP.AUTO: NEGATIVE
PH UR STRIP.AUTO: 7 [PH] (ref 4.5–8)
PROT UR STRIP.AUTO-MCNC: NEGATIVE MG/DL
RBC UR QL AUTO: NEGATIVE
SP GR UR STRIP.AUTO: 1.03 (ref 1–1.03)
UROBILINOGEN UR STRIP.AUTO-MCNC: <2 MG/DL

## 2020-09-02 PROCEDURE — 81003 URINALYSIS AUTO W/O SCOPE: CPT

## 2020-09-03 ENCOUNTER — OFFICE VISIT (OUTPATIENT)
Dept: NEUROLOGY | Facility: CLINIC | Age: 70
End: 2020-09-03
Payer: MEDICARE

## 2020-09-03 VITALS — WEIGHT: 180 LBS | BODY MASS INDEX: 25.77 KG/M2 | HEIGHT: 70 IN

## 2020-09-03 DIAGNOSIS — M54.16 LEFT LUMBAR RADICULITIS: Primary | ICD-10-CM

## 2020-09-03 PROCEDURE — 99204 OFFICE O/P NEW MOD 45 MIN: CPT | Performed by: PHYSICAL MEDICINE & REHABILITATION

## 2020-09-03 RX ORDER — MELOXICAM 15 MG/1
TABLET ORAL
Qty: 20 TABLET | Refills: 0 | Status: SHIPPED | OUTPATIENT
Start: 2020-09-03 | End: 2020-09-24

## 2020-09-03 NOTE — H&P
[facility] 38    History and Physical    Fletangtthaydee Mccord Patient Status:  No patient class for patient encounter    1950 MRN [id number]   [facility]  Attending No att. providers found   Hosp Day # 0 therapy, and has had an MRI of the lumbar spine. No history of lumbosacral injections or surgeries. His wife assists in managing his diabetes, and is concerned about the effects of any potential steroids that are administered.       History     Past Medic and well-nourished. HENT:   Head: Normocephalic and atraumatic. Eyes: Conjunctivae and EOM are normal.   Cardiovascular: Intact distal pulses. Pulmonary/Chest: Effort normal.   Abdominal: Soft.  Musculoskeletal:      Comments: Lumbar range of motion: L1-L2: Mild facet hypertrophy. No spinal canal or foraminal narrowing. L2-L3:  Mild facet hypertrophy. No spinal canal or foraminal narrowing. L3-L4:  Mild facet hypertrophy. No spinal canal or foraminal narrowing.      L4-5:  Mild disc bulge injection.     Steffanie Michael, DO  9/3/2020

## 2020-09-09 NOTE — TELEPHONE ENCOUNTER
PASSED per protocol, refill sent.   Last PE 8.24.20   Future Appointments   Date Time Provider Bart Teixeirai   9/10/2020 11:40 AM YAN Javier [facility] [place]   9/30/2020  1:15 PM Peri Johnson MD [facility]   10/6/2020 10:30 A

## 2020-09-22 RX ORDER — NATEGLINIDE 60 MG/1
TABLET ORAL
Qty: 90 TABLET | Refills: 0 | Status: SHIPPED | OUTPATIENT
Start: 2020-09-22 | End: 2020-12-16

## 2020-09-23 DIAGNOSIS — M54.16 LEFT LUMBAR RADICULITIS: ICD-10-CM

## 2020-09-23 NOTE — TELEPHONE ENCOUNTER
Refill request for meloxicam 15 mg, take 1 tab daily as needed, #20, no refills    LOV: 9/3/20  NOV: None  Last refilled on 9/3/20 for 20 tabs

## 2020-09-24 RX ORDER — MELOXICAM 15 MG/1
TABLET ORAL
Qty: 20 TABLET | Refills: 0 | Status: SHIPPED | OUTPATIENT
Start: 2020-09-24 | End: 2020-10-06

## 2020-09-24 NOTE — TELEPHONE ENCOUNTER
Will refill meloxicam this time, but if he needs more after this refill he should see me for follow up.

## 2020-09-30 ENCOUNTER — OFFICE VISIT (OUTPATIENT)
Dept: INTERNAL MEDICINE CLINIC | Facility: CLINIC | Age: 70
End: 2020-09-30
Payer: MEDICARE

## 2020-09-30 VITALS
OXYGEN SATURATION: 97 % | SYSTOLIC BLOOD PRESSURE: 150 MMHG | DIASTOLIC BLOOD PRESSURE: 70 MMHG | TEMPERATURE: 97 F | WEIGHT: 186.63 LBS | HEART RATE: 68 BPM | BODY MASS INDEX: 27 KG/M2

## 2020-09-30 DIAGNOSIS — I10 ESSENTIAL HYPERTENSION: Primary | ICD-10-CM

## 2020-09-30 DIAGNOSIS — E11.9 TYPE 2 DIABETES MELLITUS WITHOUT COMPLICATION, WITHOUT LONG-TERM CURRENT USE OF INSULIN (HCC): ICD-10-CM

## 2020-09-30 PROCEDURE — 99213 OFFICE O/P EST LOW 20 MIN: CPT | Performed by: INTERNAL MEDICINE

## 2020-09-30 RX ORDER — CYANOCOBALAMIN 1000 UG/ML
INJECTION INTRAMUSCULAR; SUBCUTANEOUS
COMMUNITY
Start: 2020-09-26

## 2020-09-30 RX ORDER — SITAGLIPTIN 100 MG/1
100 TABLET, FILM COATED ORAL DAILY
COMMUNITY
Start: 2020-08-27 | End: 2020-10-20 | Stop reason: ALTCHOICE

## 2020-09-30 RX ORDER — LISINOPRIL 20 MG/1
20 TABLET ORAL DAILY
Qty: 30 TABLET | Refills: 1 | Status: SHIPPED | OUTPATIENT
Start: 2020-09-30 | End: 2020-10-22

## 2020-09-30 NOTE — PROGRESS NOTES
HPI:    Patient ID: Aliya Ross is a 72year old male.     HPI  Here for htn, and dm, right le edema improved, CT reviewed, pt states he drinks A LOT of coffee and blames his bp on that  /70 (BP Location: Right arm, Patient Position: Sitting, Cuff daily.       • Omega-3 Fatty Acids (FISH OIL) 600 MG Oral Cap Take  by mouth. • aspirin 81 MG Oral Chew Tab Chew 81 mg by mouth daily. • cyanocobalamin 1000 MCG/ML Injection Solution      • JANUVIA 100 MG Oral Tab Take 100 mg by mouth daily.

## 2020-10-06 ENCOUNTER — OFFICE VISIT (OUTPATIENT)
Dept: NEUROLOGY | Facility: CLINIC | Age: 70
End: 2020-10-06
Payer: MEDICARE

## 2020-10-06 VITALS — HEIGHT: 70 IN | WEIGHT: 181 LBS | BODY MASS INDEX: 25.91 KG/M2

## 2020-10-06 DIAGNOSIS — M54.16 LEFT LUMBAR RADICULITIS: Primary | ICD-10-CM

## 2020-10-06 PROCEDURE — 99214 OFFICE O/P EST MOD 30 MIN: CPT | Performed by: PHYSICAL MEDICINE & REHABILITATION

## 2020-10-06 RX ORDER — CELECOXIB 100 MG/1
100 CAPSULE ORAL 2 TIMES DAILY
Qty: 60 CAPSULE | Refills: 1 | Status: SHIPPED | OUTPATIENT
Start: 2020-10-06 | End: 2020-12-08

## 2020-10-06 NOTE — PROGRESS NOTES
HPI:    Patient ID: Gadiel Shea is a 72year old male. This is a 80-year-old male who presents for follow-up.   Pain has stopped radiating into the left lower extremity, though he continues to have intermittent left lower back pain at the level of th 180 tablet 0   • FARXIGA 10 MG Oral Tab TAKE 1 TABLET BY MOUTH EVERY DAY IN THE MORNING 90 tablet 0   • ACCU-CHEK PERRY PLUS In Vitro Strip USE AS DIRECTED TO TEST DAILY 100 strip 2   • ATORVASTATIN 10 MG Oral Tab TAKE 1 TABLET BY MOUTH EVERY DAY 90 tablet consider left S1 TFESI under fluoroscopy.     Morgan Paez DO  Physical Medicine and [address] [facility] [address]

## 2020-10-06 NOTE — PATIENT INSTRUCTIONS
Recommend physical therapy. Take celebrex twice daily as needed for pain; stop the meloxicam. Come see me if you do not improve in the next 2 months.

## 2020-10-20 ENCOUNTER — OFFICE VISIT (OUTPATIENT)
Dept: ENDOCRINOLOGY CLINIC | Facility: CLINIC | Age: 70
End: 2020-10-20
Payer: MEDICARE

## 2020-10-20 VITALS
OXYGEN SATURATION: 97 % | HEIGHT: 70 IN | HEART RATE: 72 BPM | WEIGHT: 188 LBS | TEMPERATURE: 97 F | BODY MASS INDEX: 26.92 KG/M2 | DIASTOLIC BLOOD PRESSURE: 80 MMHG | SYSTOLIC BLOOD PRESSURE: 128 MMHG

## 2020-10-20 DIAGNOSIS — E11.65 TYPE 2 DIABETES MELLITUS WITH HYPERGLYCEMIA, WITHOUT LONG-TERM CURRENT USE OF INSULIN (HCC): Primary | ICD-10-CM

## 2020-10-20 PROCEDURE — 99214 OFFICE O/P EST MOD 30 MIN: CPT | Performed by: NURSE PRACTITIONER

## 2020-10-20 NOTE — PROGRESS NOTES
Patient presents with:  Diabetes: room-17 cf. pt did bring meter. HPI:   Lyudmila Woodward is a 72year old male presenting for type 2 DM medication management.  In the past 3m, DM control has been stable  Last A1C was 6.6% (8-)- update on A1C due  Obstructive sleep apnea (adult) (pediatric)     AHI 6 autoPAP 6-16 Sleep RX    • Type II or unspecified type diabetes mellitus without mention of complication, not stated as uncontrolled      Past Surgical History:   Procedure Laterality Date   • HERNIA GUERRA daily.       • Omega-3 Fatty Acids (FISH OIL) 600 MG Oral Cap Take  by mouth. • aspirin 81 MG Oral Chew Tab Chew 81 mg by mouth daily.          DM associated review of  symptoms:   Endocrine: Polyuria, polyphagia, polydipsia: no  Neurological: Paresthes times/week      The patient is asked to return in 3  m  but recommended to contact DM clinic sooner if questions or concerns. No orders of the defined types were placed in this encounter.     DM quality  A1C/Blood pressure: as reported above   Nephropa

## 2020-10-20 NOTE — PATIENT INSTRUCTIONS
Your A1C: 6.6% (update will be due )   MEDICATIONS:     Stop Januvia   Continue:   Nateglinide  60mg before dinner   Farxiga 10mg once daily in AM   Metformin 1000mg twice daily     Blood sugar testing:   Remember to bring your glucose meter or bloo event that your preferred pharmacy does not have the requested medication in stock (e.g. Backordered), it is your responsibility to find another pharmacy that has the requested medication available.   We will gladly send a new prescription to that pharmacy

## 2020-10-22 RX ORDER — LISINOPRIL 20 MG/1
TABLET ORAL
Qty: 90 TABLET | Refills: 0 | Status: SHIPPED | OUTPATIENT
Start: 2020-10-22 | End: 2021-01-27

## 2020-10-22 NOTE — TELEPHONE ENCOUNTER
Last VISIT 10/20/20 CAB DM, 9/30/20 JL HTN    Last REFILL 9/30/20 Lisinopril 30T 1R    Last LABS 9/2/20 UA, 8/17/20 PSA, HgA1c, Lipid, CMP, CBC    Future Appointments   Date Time Provider Bart Sarmiento   10/30/2020  1:40 PM Vj Gray MD [facility]

## 2020-10-30 ENCOUNTER — OFFICE VISIT (OUTPATIENT)
Dept: INTERNAL MEDICINE CLINIC | Facility: CLINIC | Age: 70
End: 2020-10-30
Payer: MEDICARE

## 2020-10-30 VITALS
SYSTOLIC BLOOD PRESSURE: 134 MMHG | BODY MASS INDEX: 27 KG/M2 | OXYGEN SATURATION: 96 % | TEMPERATURE: 98 F | WEIGHT: 186 LBS | DIASTOLIC BLOOD PRESSURE: 78 MMHG | HEART RATE: 77 BPM

## 2020-10-30 DIAGNOSIS — I10 ESSENTIAL HYPERTENSION: Primary | ICD-10-CM

## 2020-10-30 PROCEDURE — 99212 OFFICE O/P EST SF 10 MIN: CPT | Performed by: INTERNAL MEDICINE

## 2020-11-02 NOTE — PROGRESS NOTES
HPI:    Patient ID: Hemant Moya is a 72year old male.     HPI  Her for htn fu, feels well, dm, sugars ok  /78   Pulse 77   Temp 98 °F (36.7 °C) (Temporal)   Wt 186 lb (84.4 kg)   SpO2 96%   BMI 26.69 kg/m²     Review of Systems   Constitutional: Exam   Constitutional: He is oriented to person, place, and time. He appears well-developed and well-nourished. HENT:   Head: Normocephalic and atraumatic. Cardiovascular: Normal rate, regular rhythm and normal heart sounds. No murmur heard.   Pulmona

## 2020-11-16 ENCOUNTER — OFFICE VISIT (OUTPATIENT)
Dept: PHYSICAL THERAPY | Facility: HOSPITAL | Age: 70
End: 2020-11-16
Attending: PHYSICAL MEDICINE & REHABILITATION
Payer: MEDICARE

## 2020-11-16 PROCEDURE — 97161 PT EVAL LOW COMPLEX 20 MIN: CPT

## 2020-11-16 PROCEDURE — 97140 MANUAL THERAPY 1/> REGIONS: CPT

## 2020-11-16 NOTE — PROGRESS NOTES
SPINE EVALUATION:   Referring Physician: Dr. Sang Prescott  Diagnosis: L lumbar radiculitis     Date of Service: 11/16/2020     PATIENT SUMMARY   Sj Mcgowan is a 72year old male who presents to therapy today with complaints of L sided gluteal pain describ objective tests and measures show limited lumbar ROM, lumbar shift, adverse neural tension, limited LE flexibility. Functional deficits include but are not limited to bending, lifting, squatting, sitting.   Signs and symptoms are consistent with diagnosis exam findings, treatment diagnosis, treatment plan, expectations, and prognosis.  Pt was also provided recommendations for activity modifications, pain science education , detrimental fear avoidance behaviors and importance of remaining active  Patient was Date____________________    Certification From: 25/73/6683  To:2/14/2021

## 2020-11-18 ENCOUNTER — OFFICE VISIT (OUTPATIENT)
Dept: PHYSICAL THERAPY | Facility: HOSPITAL | Age: 70
End: 2020-11-18
Attending: PHYSICAL MEDICINE & REHABILITATION
Payer: MEDICARE

## 2020-11-18 PROCEDURE — 97110 THERAPEUTIC EXERCISES: CPT

## 2020-11-18 PROCEDURE — 97140 MANUAL THERAPY 1/> REGIONS: CPT

## 2020-11-18 NOTE — PROGRESS NOTES
Dx: L lumbar radiculitis            Authorized # of Visits:  8 per POC         Next MD visit: none scheduled  Fall Risk: standard         Precautions: n/a             Subjective: Patient reports that his symptoms are about the same.  He may feel a little be MARI, gr III+, 10 min                                      Charges: Thanh 2( 35 min) manual x 1 ( 10 min)   Total Timed Treatment: 45 min     Total Treatment Time: 45 min

## 2020-11-23 ENCOUNTER — OFFICE VISIT (OUTPATIENT)
Dept: PHYSICAL THERAPY | Facility: HOSPITAL | Age: 70
End: 2020-11-23
Attending: PHYSICAL MEDICINE & REHABILITATION
Payer: MEDICARE

## 2020-11-23 PROCEDURE — 97140 MANUAL THERAPY 1/> REGIONS: CPT

## 2020-11-23 PROCEDURE — 97110 THERAPEUTIC EXERCISES: CPT

## 2020-11-23 NOTE — PROGRESS NOTES
Dx: L lumbar radiculitis            Authorized # of Visits:  8 per POC         Next MD visit: none scheduled  Fall Risk: standard         Precautions: n/a             Subjective: Patient reports that he had increased pain the day following previous session press up w/ shift correction, 2x10        PPT w/ 2 leg march, 3x5 standing shift correction against wall in slight extension, [id number]        quadruped leg lift, [id number] Bridge, 2x10        quadruped hip extension - pain quadruped leg lift, [id number]

## 2020-11-30 ENCOUNTER — OFFICE VISIT (OUTPATIENT)
Dept: PHYSICAL THERAPY | Facility: HOSPITAL | Age: 70
End: 2020-11-30
Attending: PHYSICAL MEDICINE & REHABILITATION
Payer: MEDICARE

## 2020-11-30 PROCEDURE — 97112 NEUROMUSCULAR REEDUCATION: CPT

## 2020-11-30 PROCEDURE — 97110 THERAPEUTIC EXERCISES: CPT

## 2020-11-30 NOTE — PROGRESS NOTES
Dx: L lumbar radiculitis            Authorized # of Visits:  8 per POC         Next MD visit: none scheduled  Fall Risk: standard         Precautions: n/a             Subjective: Patient reports that progress is slow.  Pain is certainly not worse and may be extension, 2x5 standing lumbar shift against wall, 15       Bridge, 3x5 Prone press up w/ shift correction, 2x10 Prone press up w/ shift correction, 2x10       PPT w/ 2 leg march, 3x5 standing shift correction against wall in slight extension, [id number] S/l

## 2020-12-08 ENCOUNTER — OFFICE VISIT (OUTPATIENT)
Dept: NEUROLOGY | Facility: CLINIC | Age: 70
End: 2020-12-08
Payer: MEDICARE

## 2020-12-08 VITALS — WEIGHT: 188 LBS | HEIGHT: 70 IN | BODY MASS INDEX: 26.92 KG/M2

## 2020-12-08 DIAGNOSIS — M54.16 LEFT LUMBAR RADICULITIS: Primary | ICD-10-CM

## 2020-12-08 PROCEDURE — 99214 OFFICE O/P EST MOD 30 MIN: CPT | Performed by: PHYSICAL MEDICINE & REHABILITATION

## 2020-12-08 RX ORDER — CELECOXIB 100 MG/1
100 CAPSULE ORAL 2 TIMES DAILY PRN
Qty: 60 CAPSULE | Refills: 0 | Status: SHIPPED | OUTPATIENT
Start: 2020-12-08 | End: 2021-01-06

## 2020-12-08 NOTE — PROGRESS NOTES
HPI:    Patient ID: Koffi Aleman is a 72year old male. 61-year-old male presents for follow-up. Has been participating in physical therapy and does feel improvement.   He gives me multiple examples of tasks that he has been able to tolerate that he for Pain. 60 capsule 0   • metFORMIN HCl 1000 MG Oral Tab Take 1 tablet (1,000 mg total) by mouth 2 (two) times daily with meals.  180 tablet 3   • LISINOPRIL 20 MG Oral Tab TAKE 1 TABLET BY MOUTH EVERY DAY 90 tablet 0   • cyanocobalamin 1000 MCG/ML Injecti ASSESSMENT/PLAN:   Left lumbar radiculitis  (primary encounter diagnosis)    Continues to improve, though slowly.   I discussed with him that in general it can be harmful to take NSAIDs chronically, and it would be best for him to either take the medica

## 2020-12-08 NOTE — PATIENT INSTRUCTIONS
Decrease the celebrex use to as needed. Continue with the physical therapy and with walking as you are able.

## 2020-12-09 ENCOUNTER — MED REC SCAN ONLY (OUTPATIENT)
Dept: NEUROLOGY | Facility: CLINIC | Age: 70
End: 2020-12-09

## 2020-12-09 ENCOUNTER — APPOINTMENT (OUTPATIENT)
Dept: PHYSICAL THERAPY | Facility: HOSPITAL | Age: 70
End: 2020-12-09
Attending: PHYSICAL MEDICINE & REHABILITATION
Payer: MEDICARE

## 2020-12-11 ENCOUNTER — OFFICE VISIT (OUTPATIENT)
Dept: PHYSICAL THERAPY | Facility: HOSPITAL | Age: 70
End: 2020-12-11
Attending: PHYSICAL MEDICINE & REHABILITATION
Payer: MEDICARE

## 2020-12-11 PROCEDURE — 97112 NEUROMUSCULAR REEDUCATION: CPT

## 2020-12-11 PROCEDURE — 97110 THERAPEUTIC EXERCISES: CPT

## 2020-12-11 NOTE — PROGRESS NOTES
Dx: L lumbar radiculitis            Authorized # of Visits:  8 per POC         Next MD visit: none scheduled  Fall Risk: standard         Precautions: n/a             Subjective: Patient is having more pain since previous session.  He believes that squattin plank on table, 5x10 sec     quadruped leg lift, [id number] Bridge, 2x10 supine hip hiking, 5 min total Lumbar flexion standing as assessment standing multifidus iso w/ forward step     quadruped hip extension - pain quadruped leg lift, [id number] SLS w/ hip h

## 2020-12-15 ENCOUNTER — OFFICE VISIT (OUTPATIENT)
Dept: PHYSICAL THERAPY | Facility: HOSPITAL | Age: 70
End: 2020-12-15
Attending: PHYSICAL MEDICINE & REHABILITATION
Payer: MEDICARE

## 2020-12-15 PROCEDURE — 97110 THERAPEUTIC EXERCISES: CPT

## 2020-12-15 PROCEDURE — 97112 NEUROMUSCULAR REEDUCATION: CPT

## 2020-12-16 RX ORDER — NATEGLINIDE 60 MG/1
TABLET, COATED ORAL
Qty: 90 TABLET | Refills: 0 | Status: SHIPPED | OUTPATIENT
Start: 2020-12-16 | End: 2021-03-11

## 2020-12-16 NOTE — PROGRESS NOTES
Dx: L lumbar radiculitis            Authorized # of Visits:  8 per POC         Next MD visit: none scheduled  Fall Risk: standard         Precautions: n/a             Subjective: Patient reports that his pain is somewhat improved from previous session but 2x10 standing lumbar shift against wall Lateral walk, 2 laps    Yellow TB 3 laps    Bridge, 3x5 Prone press up w/ shift correction, 2x10 Prone press up w/ shift correction, 2x10 Squat, 7#, 3x5 supine axial distraction shoulder flexion w/ foot on step, 4#,

## 2020-12-18 ENCOUNTER — OFFICE VISIT (OUTPATIENT)
Dept: PHYSICAL THERAPY | Facility: HOSPITAL | Age: 70
End: 2020-12-18
Attending: PHYSICAL MEDICINE & REHABILITATION
Payer: MEDICARE

## 2020-12-18 PROCEDURE — 97110 THERAPEUTIC EXERCISES: CPT

## 2020-12-18 NOTE — PROGRESS NOTES
Dx: L lumbar radiculitis            Authorized # of Visits:  8 per POC         Next MD visit: none scheduled  Fall Risk: standard         Precautions: n/a             Subjective: Patient reports that he is now able to sit without pain.  Exercises do not [employer] laps Lateral walk, yellow TB, 4 laps   Bridge, 3x5 Prone press up w/ shift correction, 2x10 Prone press up w/ shift correction, 2x10 Squat, 7#, 3x5 supine axial distraction shoulder flexion w/ foot on step, 4#, 2x10 Tandem walk w/ suitcase carry, 5#, 3 lap

## 2020-12-21 RX ORDER — SITAGLIPTIN 100 MG/1
TABLET, FILM COATED ORAL
Qty: 90 TABLET | Refills: 1 | Status: SHIPPED | OUTPATIENT
Start: 2020-12-21 | End: 2021-06-17

## 2020-12-21 RX ORDER — DAPAGLIFLOZIN 10 MG/1
TABLET, FILM COATED ORAL
Qty: 90 TABLET | Refills: 0 | Status: SHIPPED | OUTPATIENT
Start: 2020-12-21 | End: 2021-03-16

## 2020-12-22 ENCOUNTER — OFFICE VISIT (OUTPATIENT)
Dept: PHYSICAL THERAPY | Facility: HOSPITAL | Age: 70
End: 2020-12-22
Attending: PHYSICAL MEDICINE & REHABILITATION
Payer: MEDICARE

## 2020-12-22 PROCEDURE — 97110 THERAPEUTIC EXERCISES: CPT

## 2020-12-23 NOTE — PROGRESS NOTES
Dx: L lumbar radiculitis            Authorized # of Visits:  8 per POC         Next MD visit: none scheduled  Fall Risk: standard         Precautions: n/a             Subjective: Patient that symptoms are about the same compared to previous session.  Still laps Lateral walk, yellow TB, 4 laps Hook lying hip ER, green TB, 2x15    Bridge, 3x5 Prone press up w/ shift correction, 2x10 Prone press up w/ shift correction, 2x10 Squat, 7#, 3x5 supine axial distraction shoulder flexion w/ foot on step, 4#, 2x10 Tande

## 2020-12-29 ENCOUNTER — OFFICE VISIT (OUTPATIENT)
Dept: PHYSICAL THERAPY | Facility: HOSPITAL | Age: 70
End: 2020-12-29
Attending: PHYSICAL MEDICINE & REHABILITATION
Payer: MEDICARE

## 2020-12-29 PROCEDURE — 97110 THERAPEUTIC EXERCISES: CPT

## 2020-12-30 NOTE — PROGRESS NOTES
Progress Summary  Pt has attended 10 visits in Physical Therapy.      Dx: L lumbar radiculitis            Authorized # of Visits:  14 per POC         Next MD visit: none scheduled  Fall Risk: standard         Precautions: n/a             Subjective: Patien objects from low surfaces. 4. Patient will demonstrate improvement in FOTO score equal or greater than WILLIAM within 6 weeks in order to improve functional mobility.     Plan: continue PT to decrease low back/gluteal pain    Date: 11/18/2020  Tx#: 2/8 Date: 1 4\", 2x5 multifidus w/ SLR, 5x5 standing multifidus contraction w/ partial forward lunge, 2x5 Quadruped hip extension, 3x5 Hip extension leaning on table, 2x12 Wall slide lift off, 20   Prone L4, L5 L UPA, gr III+, 10 min Prone lumbar shift correction, 5 m

## 2021-01-05 ENCOUNTER — OFFICE VISIT (OUTPATIENT)
Dept: PHYSICAL THERAPY | Facility: HOSPITAL | Age: 71
End: 2021-01-05
Attending: PHYSICAL MEDICINE & REHABILITATION
Payer: MEDICARE

## 2021-01-05 PROCEDURE — 97110 THERAPEUTIC EXERCISES: CPT

## 2021-01-05 PROCEDURE — 97140 MANUAL THERAPY 1/> REGIONS: CPT

## 2021-01-06 DIAGNOSIS — M54.16 LEFT LUMBAR RADICULITIS: ICD-10-CM

## 2021-01-06 RX ORDER — CELECOXIB 100 MG/1
100 CAPSULE ORAL 2 TIMES DAILY PRN
Qty: 60 CAPSULE | Refills: 0 | Status: SHIPPED | OUTPATIENT
Start: 2021-01-06 | End: 2021-02-08

## 2021-01-06 NOTE — TELEPHONE ENCOUNTER
Medication request: Celecoxib 100 mg oral cap take 1 capsule by mouth two times daily as needed for pain.  Qty 60     LOV 12/08/2020   NOV 03/09/2021    [place]: 12/08/2020      Spoke with patient who stated that he would like to have another refi

## 2021-01-06 NOTE — PROGRESS NOTES
Dx: L lumbar radiculitis            Authorized # of Visits:  14 per POC         Next MD visit: none scheduled  Fall Risk: standard         Precautions: n/a             Subjective: Patient generally continues to be better.  His pain is manageable but is st improve functional mobility.     Plan: continue PT to decrease low back/gluteal pain    Date: 11/18/2020  Tx#: 2/8 Date: 11/23/20  Tx#: 3/8 Date: 11/30/20  Tx#: 4/8 Date: 12/8/20  Tx#: 5/8 Date: 12/11/20  Tx#: 6/8 Date: 12/15/20  Tx#: 7/8 Date: 12/18/20  Tx hike w/ manual resistance      Lateral step up, 4\" step w/ mirror cuing Lateral step up to SLS, 4\", 2x5 multifidus w/ SLR, 5x5 standing multifidus contraction w/ partial forward lunge, 2x5 Quadruped hip extension, 3x5 Hip extension leaning on table, 2x12

## 2021-01-08 ENCOUNTER — OFFICE VISIT (OUTPATIENT)
Dept: PHYSICAL THERAPY | Facility: HOSPITAL | Age: 71
End: 2021-01-08
Attending: PHYSICAL MEDICINE & REHABILITATION
Payer: MEDICARE

## 2021-01-08 PROCEDURE — 97140 MANUAL THERAPY 1/> REGIONS: CPT

## 2021-01-08 PROCEDURE — 97110 THERAPEUTIC EXERCISES: CPT

## 2021-01-08 NOTE — PROGRESS NOTES
Discharge Summary  Pt has attended 12 visits in Physical Therapy.    Dx: L lumbar radiculitis            Authorized # of Visits:  14 per POC         Next MD visit: none scheduled  Fall Risk: standard         Precautions: n/a             Subjective: Patient (N/T)      Date: 11/18/2020  Tx#: 2/8 Date: 11/23/20  Tx#: 3/8 Date: 11/30/20  Tx#: 4/8 Date: 12/8/20  Tx#: 5/8 Date: 12/11/20  Tx#: 6/8 Date: 12/15/20  Tx#: 7/8 Date: 12/18/20  Tx#: 8/10 Date: 12/22/20  Tx#: 9/10 Date:   12/29/20  Tx#: 10 Date: 1/5/21  Tx 27#, 2x15 seated cable abdominal crunch, 15#, 2x15 supine R hip hike w/ manual resistance standing hip extension w/ cable, 10#, 3x15     Lateral step up, 4\" step w/ mirror cuing Lateral step up to SLS, 4\", 2x5 multifidus w/ SLR, 5x5 standing multifidus c

## 2021-01-25 ENCOUNTER — OFFICE VISIT (OUTPATIENT)
Dept: ENDOCRINOLOGY CLINIC | Facility: CLINIC | Age: 71
End: 2021-01-25
Payer: MEDICARE

## 2021-01-25 VITALS
BODY MASS INDEX: 27.06 KG/M2 | HEART RATE: 76 BPM | DIASTOLIC BLOOD PRESSURE: 76 MMHG | SYSTOLIC BLOOD PRESSURE: 126 MMHG | WEIGHT: 189 LBS | HEIGHT: 70 IN | OXYGEN SATURATION: 98 %

## 2021-01-25 DIAGNOSIS — E11.65 TYPE 2 DIABETES MELLITUS WITH HYPERGLYCEMIA, WITHOUT LONG-TERM CURRENT USE OF INSULIN (HCC): Primary | ICD-10-CM

## 2021-01-25 LAB
CARTRIDGE LOT#: 736 NUMERIC
HEMOGLOBIN A1C: 6.5 % (ref 4.3–5.6)

## 2021-01-25 PROCEDURE — 83036 HEMOGLOBIN GLYCOSYLATED A1C: CPT | Performed by: NURSE PRACTITIONER

## 2021-01-25 PROCEDURE — 99214 OFFICE O/P EST MOD 30 MIN: CPT | Performed by: NURSE PRACTITIONER

## 2021-01-25 RX ORDER — BLOOD SUGAR DIAGNOSTIC
STRIP MISCELLANEOUS
Qty: 100 STRIP | Refills: 3 | Status: SHIPPED | OUTPATIENT
Start: 2021-01-25 | End: 2022-01-25

## 2021-01-25 NOTE — PROGRESS NOTES
Patient presents with:  Diabetes: follow up, cd, rm 12, does not have meter      HPI:   Karen Jade is a 72year old male presenting for type 2 DM medication management.  In the past 3m, DM control has been stable  A1C today is 6.5%( last A1C was 6.6% (8- stones    • Obstructive sleep apnea (adult) (pediatric)     AHI 6 autoPAP 6-16 Sleep RX    • Type II or unspecified type diabetes mellitus without mention of complication, not stated as uncontrolled      Past Surgical History:   Procedure Laterality Date Tab Chew 600 mg by mouth as needed. • Coenzyme Q10 (COQ10) 400 MG Oral Cap Take by mouth. • Wlueqnndv-Uwxcifotj-Qirehqfzlo (CEREFOLIN NAC) 6-2-600 MG Oral Tab Take 1 tablet by mouth daily.        • Omega-3 Fatty Acids (FISH OIL) 600 MG Oral Cap Ta reminded to take w food to avoid GI sxs       Reminded pt on A1C and blood sugar targets (Fasting < 130 and post prandial <894 ) and complications associated with hyperglycemia and uncontrolled DM (on AVS)   Recommended SMBG 1x daily y   Reviewed s/s and t

## 2021-01-25 NOTE — PATIENT INSTRUCTIONS
Your A1C: 6.5% (last A1C 6.6%)   This is really good for you  The A1C test provides us with your average blood sugar for the past 3 months. Keeping an A1C less than 7% helps reduce or delay health problems that are related to diabetes.    The main goal of d are not acceptable to reach your goal of improving diabetes    Watch for low blood sugars: (less than 70 )  Symptoms of low blood sugar:   · Shakiness or dizziness  · Cold, clammy skin or sweating  · Feeling hungry  · Headache  · Nervousness  · A hard, fas for labs, you will have 30 days to complete the  requested labs before future refills are authorized.    · In the event that your preferred pharmacy does not have the requested medication in stock (e.g. Backordered), it is your responsibility to find anothe

## 2021-01-27 RX ORDER — LISINOPRIL 20 MG/1
TABLET ORAL
Qty: 90 TABLET | Refills: 0 | Status: SHIPPED | OUTPATIENT
Start: 2021-01-27 | End: 2021-04-26

## 2021-01-27 NOTE — TELEPHONE ENCOUNTER
Last visit-  10/30/2020    Last refill-  10/22/2020 lisinopril 20mg QTY90 0R    Last labs-  08/17/2020 psa screen, hemoglobin a1c, lipid, cmp, cbc  Future Appointments   Date Time Provider Bart Sarmiento   3/8/2021  2:00 PM Ellyn Johnson MD [facility] 7

## 2021-02-06 DIAGNOSIS — M54.16 LEFT LUMBAR RADICULITIS: ICD-10-CM

## 2021-02-08 RX ORDER — CELECOXIB 100 MG/1
100 CAPSULE ORAL 2 TIMES DAILY PRN
Qty: 60 CAPSULE | Refills: 0 | Status: SHIPPED | OUTPATIENT
Start: 2021-02-08

## 2021-02-08 NOTE — TELEPHONE ENCOUNTER
Medication request: Celecoxib 100mg TAKE 1 CAPSULE (100 MG TOTAL) BY MOUTH 2 (TWO) TIMES DAILY AS NEEDED FOR PAIN.    LOV: 12/08/20   NOV: 03/09/21     [place] 01/06/21 #60 r-0

## 2021-03-04 DIAGNOSIS — Z23 NEED FOR VACCINATION: ICD-10-CM

## 2021-03-07 ENCOUNTER — IMMUNIZATION (OUTPATIENT)
Dept: LAB | Age: 71
End: 2021-03-07
Attending: HOSPITALIST
Payer: MEDICARE

## 2021-03-07 DIAGNOSIS — Z23 NEED FOR VACCINATION: Primary | ICD-10-CM

## 2021-03-07 PROCEDURE — 0001A SARSCOV2 VAC 30MCG/0.3ML IM: CPT

## 2021-03-08 ENCOUNTER — OFFICE VISIT (OUTPATIENT)
Dept: INTERNAL MEDICINE CLINIC | Facility: CLINIC | Age: 71
End: 2021-03-08
Payer: MEDICARE

## 2021-03-08 ENCOUNTER — TELEPHONE (OUTPATIENT)
Dept: INTERNAL MEDICINE CLINIC | Facility: CLINIC | Age: 71
End: 2021-03-08

## 2021-03-08 VITALS
DIASTOLIC BLOOD PRESSURE: 74 MMHG | TEMPERATURE: 97 F | SYSTOLIC BLOOD PRESSURE: 132 MMHG | BODY MASS INDEX: 28 KG/M2 | HEART RATE: 80 BPM | RESPIRATION RATE: 16 BRPM | WEIGHT: 192 LBS

## 2021-03-08 DIAGNOSIS — I10 ESSENTIAL HYPERTENSION: ICD-10-CM

## 2021-03-08 DIAGNOSIS — Z12.5 SCREENING FOR MALIGNANT NEOPLASM OF PROSTATE: ICD-10-CM

## 2021-03-08 DIAGNOSIS — Z12.11 SPECIAL SCREENING FOR MALIGNANT NEOPLASMS, COLON: ICD-10-CM

## 2021-03-08 DIAGNOSIS — E78.49 OTHER HYPERLIPIDEMIA: ICD-10-CM

## 2021-03-08 DIAGNOSIS — E11.9 TYPE 2 DIABETES MELLITUS WITHOUT COMPLICATION, WITHOUT LONG-TERM CURRENT USE OF INSULIN (HCC): ICD-10-CM

## 2021-03-08 DIAGNOSIS — I10 ESSENTIAL HYPERTENSION: Primary | ICD-10-CM

## 2021-03-08 DIAGNOSIS — Z00.00 ROUTINE GENERAL MEDICAL EXAMINATION AT A HEALTH CARE FACILITY: Primary | ICD-10-CM

## 2021-03-08 PROCEDURE — 99213 OFFICE O/P EST LOW 20 MIN: CPT | Performed by: INTERNAL MEDICINE

## 2021-03-08 NOTE — PROGRESS NOTES
Adrian Randall is a 72year old male who presents for Blood Pressure (SN Rm 15; F/U)-dm, hyperchol    Chief Complaint Reviewed and Verified  Nursing Notes Reviewed and   Verified  Tobacco Reviewed  Allergies Reviewed  Medications Reviewed    Problem Lis Eyes: Negative for visual disturbance. Respiratory: Negative for shortness of breath. Cardiovascular: Negative for chest pain. Genitourinary: Negative for dysuria. Neurological: Negative for headaches.        Objective:   /74 (BP Location:

## 2021-03-09 ENCOUNTER — OFFICE VISIT (OUTPATIENT)
Dept: NEUROLOGY | Facility: CLINIC | Age: 71
End: 2021-03-09
Payer: MEDICARE

## 2021-03-09 VITALS
HEART RATE: 84 BPM | SYSTOLIC BLOOD PRESSURE: 146 MMHG | BODY MASS INDEX: 27.49 KG/M2 | DIASTOLIC BLOOD PRESSURE: 78 MMHG | WEIGHT: 192 LBS | RESPIRATION RATE: 20 BRPM | HEIGHT: 70 IN

## 2021-03-09 DIAGNOSIS — M54.16 LEFT LUMBAR RADICULITIS: Primary | ICD-10-CM

## 2021-03-09 PROCEDURE — 99214 OFFICE O/P EST MOD 30 MIN: CPT | Performed by: PHYSICAL MEDICINE & REHABILITATION

## 2021-03-09 NOTE — PROGRESS NOTES
Progress note    C/C: left buttock pain    HPI: 71 yo m presents for f/u. Left buttock pain much improved. Completed PT in January, released to [facility]. Able to lift more, approximately 10 pounds, without provoking pain.  Still can provoke pain with forward johnny

## 2021-03-10 NOTE — H&P
[facility], [address] [place], [place] [address]    History and Physical    Mead Obeds Patient Status:  No patient class for patient encounter    1950 MRN [id number]   Location [facility], [address] [place] [address] Attending No att. Cherise Zarate 08/17/2020    CREATSERUM 0.86 08/17/2020    BUN 17 08/17/2020     08/17/2020    K 4.2 08/17/2020     08/17/2020    CO2 32.0 08/17/2020     (H) 08/17/2020    CA 9.0 08/17/2020    ALB 4.0 08/17/2020    ALKPHO 33 (L) 08/17/2020    BILT 0.6

## 2021-03-10 NOTE — PROGRESS NOTES
HPI/Subjective:   Patient ID: Koffi Aleman is a 72year old male.     HPI  Here for dm, htn, hypercol, feels well  /74 (BP Location: Right arm, Patient Position: Sitting, Cuff Size: adult)   Pulse 80   Temp 97.2 °F (36.2 °C) (Temporal)   Resp 16 tablet by mouth daily. • Omega-3 Fatty Acids (FISH OIL) 600 MG Oral Cap Take  by mouth. • aspirin 81 MG Oral Chew Tab Chew 81 mg by mouth daily. Allergies:   Other                   RASH, ITCHING    Comment:UV rays/Sunlight  Flu Virus Vaccin

## 2021-03-11 RX ORDER — NATEGLINIDE 60 MG/1
TABLET, COATED ORAL
Qty: 90 TABLET | Refills: 0 | Status: SHIPPED | OUTPATIENT
Start: 2021-03-11 | End: 2021-06-14

## 2021-03-16 RX ORDER — DAPAGLIFLOZIN 10 MG/1
TABLET, FILM COATED ORAL
Qty: 90 TABLET | Refills: 0 | Status: SHIPPED | OUTPATIENT
Start: 2021-03-16 | End: 2021-07-15

## 2021-03-22 RX ORDER — ATORVASTATIN CALCIUM 10 MG/1
TABLET, FILM COATED ORAL
Qty: 90 TABLET | Refills: 1 | Status: SHIPPED | OUTPATIENT
Start: 2021-03-22 | End: 2021-09-20

## 2021-03-26 ENCOUNTER — OFFICE VISIT (OUTPATIENT)
Dept: SURGERY | Facility: CLINIC | Age: 71
End: 2021-03-26
Payer: MEDICARE

## 2021-03-26 VITALS — WEIGHT: 190 LBS | TEMPERATURE: 98 F | BODY MASS INDEX: 27.2 KG/M2 | HEIGHT: 70 IN

## 2021-03-26 DIAGNOSIS — Z80.0 FAMILY HISTORY OF MALIGNANT NEOPLASM OF COLON: ICD-10-CM

## 2021-03-26 DIAGNOSIS — E11.9 TYPE 2 DIABETES MELLITUS WITHOUT COMPLICATION, WITHOUT LONG-TERM CURRENT USE OF INSULIN (HCC): ICD-10-CM

## 2021-03-26 DIAGNOSIS — Z01.818 PRE-OP TESTING: Primary | ICD-10-CM

## 2021-03-26 DIAGNOSIS — G47.33 OSA (OBSTRUCTIVE SLEEP APNEA): ICD-10-CM

## 2021-03-26 PROCEDURE — 99213 OFFICE O/P EST LOW 20 MIN: CPT | Performed by: SURGERY

## 2021-03-26 RX ORDER — SODIUM, POTASSIUM,MAG SULFATES 17.5-3.13G
SOLUTION, RECONSTITUTED, ORAL ORAL
Qty: 1 KIT | Refills: 0 | Status: SHIPPED | OUTPATIENT
Start: 2021-03-26 | End: 2021-10-11 | Stop reason: ALTCHOICE

## 2021-03-26 NOTE — H&P
New Patient Visit Note       Active Problems      1. Pre-op testing    2. Family history of malignant neoplasm of colon    3. Type 2 diabetes mellitus without complication, without long-term current use of insulin ([facility] [address]    4.  BAKARI (obstructive sleep apnea) Activity      Alcohol use: No        Alcohol/week: 0.0 standard drinks      Drug use: No      Sexual activity: Yes    Other Topics      Concerns:        Caffeine Concern: Yes          3-4 cups coffee daily        Exercise: Yes    Social History Narrative Disp: 90 tablet, Rfl: 0  •  Glucose Blood (ACCU-CHEK GUIDE) In Vitro Strip, Test 1 x daily, Disp: 100 strip, Rfl: 3  •  JANUVIA 100 MG Oral Tab, TAKE 1 TABLET BY MOUTH EVERY DAY, Disp: 90 tablet, Rfl: 1  •  metFORMIN HCl 1000 MG Oral Tab, Take 1 tablet (1, arthralgias and myalgias. Skin: Negative for color change and rash. Neurological: Negative for tremors, syncope and weakness. Hematological: Negative for adenopathy. Does not bruise/bleed easily.    Psychiatric/Behavioral: Negative for behavioral prob present. Orders Placed This Encounter      Covid-19 Testing by PCR ([facility])      Imaging & Referrals   None    Follow Up  Return in about 1 year (around 3/26/2022).     June Estrella MD

## 2021-03-28 ENCOUNTER — IMMUNIZATION (OUTPATIENT)
Dept: LAB | Age: 71
End: 2021-03-28
Attending: HOSPITALIST
Payer: MEDICARE

## 2021-03-28 DIAGNOSIS — Z23 NEED FOR VACCINATION: Primary | ICD-10-CM

## 2021-03-28 PROCEDURE — 0002A SARSCOV2 VAC 30MCG/0.3ML IM: CPT

## 2021-04-26 RX ORDER — LISINOPRIL 20 MG/1
TABLET ORAL
Qty: 90 TABLET | Refills: 0 | Status: SHIPPED | OUTPATIENT
Start: 2021-04-26 | End: 2021-08-27

## 2021-06-11 ENCOUNTER — TELEPHONE (OUTPATIENT)
Dept: SURGERY | Facility: CLINIC | Age: 71
End: 2021-06-11

## 2021-06-11 RX ORDER — POLYETHYLENE GLYCOL 3350, SODIUM CHLORIDE, SODIUM BICARBONATE, POTASSIUM CHLORIDE 420; 11.2; 5.72; 1.48 G/4L; G/4L; G/4L; G/4L
POWDER, FOR SOLUTION ORAL
Qty: 4000 ML | Refills: 0 | Status: SHIPPED | OUTPATIENT
Start: 2021-06-11 | End: 2021-10-11 | Stop reason: ALTCHOICE

## 2021-06-11 NOTE — TELEPHONE ENCOUNTER
Patient called office stating Elissa Manuel is not covered, patient wanted Trilyte called into [facility].

## 2021-06-14 RX ORDER — NATEGLINIDE 60 MG/1
TABLET, COATED ORAL
Qty: 90 TABLET | Refills: 0 | Status: SHIPPED | OUTPATIENT
Start: 2021-06-14 | End: 2021-09-07

## 2021-06-14 NOTE — TELEPHONE ENCOUNTER
Requested Prescriptions     Pending Prescriptions Disp Refills   • NATEGLINIDE 60 MG Oral Tab [Pharmacy Med Name: NATEGLINIDE 60 MG TABLET] 90 tablet 0     Sig: TAKE ONE TABLET BY MOUTH BEFORE DINNER     FILLED- 03/11/21  LOV- 01/25/21  FOV- 07/26/21  LAST

## 2021-06-17 RX ORDER — SITAGLIPTIN 100 MG/1
TABLET, FILM COATED ORAL
Qty: 90 TABLET | Refills: 1 | Status: SHIPPED | OUTPATIENT
Start: 2021-06-17 | End: 2021-11-19

## 2021-06-24 ENCOUNTER — PATIENT OUTREACH (OUTPATIENT)
Dept: SURGERY | Facility: CLINIC | Age: 71
End: 2021-06-24

## 2021-07-06 ENCOUNTER — OFFICE VISIT (OUTPATIENT)
Dept: NEUROLOGY | Facility: CLINIC | Age: 71
End: 2021-07-06
Payer: MEDICARE

## 2021-07-06 VITALS
DIASTOLIC BLOOD PRESSURE: 60 MMHG | BODY MASS INDEX: 27.2 KG/M2 | SYSTOLIC BLOOD PRESSURE: 120 MMHG | WEIGHT: 190 LBS | HEIGHT: 70 IN

## 2021-07-06 DIAGNOSIS — M54.16 LEFT LUMBAR RADICULITIS: Primary | ICD-10-CM

## 2021-07-06 PROCEDURE — 99214 OFFICE O/P EST MOD 30 MIN: CPT | Performed by: PHYSICAL MEDICINE & REHABILITATION

## 2021-07-06 NOTE — PROGRESS NOTES
Progress note    C/C: left buttock pain    HPI: 23-year-old male presents for follow-up. Able to garden, and perform activity that requires repetitive or prolonged forward bending, though he will still feel some discomfort in the left buttock.   He has not

## 2021-07-15 RX ORDER — DAPAGLIFLOZIN 10 MG/1
TABLET, FILM COATED ORAL
Qty: 90 TABLET | Refills: 0 | Status: SHIPPED | OUTPATIENT
Start: 2021-07-15 | End: 2021-10-14

## 2021-07-19 ENCOUNTER — TELEPHONE (OUTPATIENT)
Dept: PHYSICAL THERAPY | Facility: HOSPITAL | Age: 71
End: 2021-07-19

## 2021-07-26 ENCOUNTER — OFFICE VISIT (OUTPATIENT)
Dept: ENDOCRINOLOGY CLINIC | Facility: CLINIC | Age: 71
End: 2021-07-26
Payer: MEDICARE

## 2021-07-26 VITALS
SYSTOLIC BLOOD PRESSURE: 118 MMHG | WEIGHT: 192 LBS | HEART RATE: 86 BPM | OXYGEN SATURATION: 98 % | HEIGHT: 70 IN | RESPIRATION RATE: 18 BRPM | BODY MASS INDEX: 27.49 KG/M2 | DIASTOLIC BLOOD PRESSURE: 80 MMHG

## 2021-07-26 DIAGNOSIS — I10 ESSENTIAL HYPERTENSION: ICD-10-CM

## 2021-07-26 DIAGNOSIS — E11.65 TYPE 2 DIABETES MELLITUS WITH HYPERGLYCEMIA, WITHOUT LONG-TERM CURRENT USE OF INSULIN (HCC): Primary | ICD-10-CM

## 2021-07-26 DIAGNOSIS — E78.49 OTHER HYPERLIPIDEMIA: ICD-10-CM

## 2021-07-26 LAB
CARTRIDGE LOT#: 825 NUMERIC
HEMOGLOBIN A1C: 7 % (ref 4.3–5.6)

## 2021-07-26 PROCEDURE — 99214 OFFICE O/P EST MOD 30 MIN: CPT | Performed by: NURSE PRACTITIONER

## 2021-07-26 PROCEDURE — 83036 HEMOGLOBIN GLYCOSYLATED A1C: CPT | Performed by: NURSE PRACTITIONER

## 2021-07-26 RX ORDER — BLOOD SUGAR DIAGNOSTIC
1 STRIP MISCELLANEOUS DAILY
Qty: 100 STRIP | Refills: 2 | Status: SHIPPED | OUTPATIENT
Start: 2021-07-26

## 2021-07-26 RX ORDER — LACTULOSE 10 G/15ML
15 SOLUTION ORAL 2 TIMES DAILY
COMMUNITY
Start: 2021-05-11

## 2021-07-26 NOTE — PROGRESS NOTES
Patient presents with:  Diabetes: F/U, pt brought meter, pt wants to discuss insulin options       Luis Lewis is a 80year old male presenting for type 2 DM medication management.   Christelle Herrera MD, primary care physician    In the past 3m, DM control has or infections: (past 3m) no     Allergies:  Other and Flu Virus Vaccine    Past Medical History:   Diagnosis Date   • Essential hypertension    • Hematuria    • Hyperlipidemia    • Kidney stones    • Obstructive sleep apnea (adult) (pediatric)     AHI 6 aut latanoprost 0.005 % Ophthalmic Solution      • Glucose Blood (ACCU-CHEK PERRY PLUS) In Vitro Strip 1 each by Other route 2 (two) times daily. Use as directed.  100 strip 1   • MEMANTINE HCL 10 MG Oral Tab TAKE 1 TABLET BY MOUTH TWICE A DAY 60 tablet 0   • C affect. Assessment/Plan:    Hypertension  Well controlled today   CPM .      Dyslipidemia  Last Lab  [id number] labs before  appt w PCP   Continue: atorvastatin rx.           Type 2 diabetes mellitus without long-term current use of insuli strip          Refill:  2    DM quality  A1C/Blood pressure: as reported above   Nephropathy screenin.  continue ace /arb rx. LIPID screening: labs -  . atorvastatin rx.    Last dilated eye exam: Last Dilated Eye Exam: 20 ([address] )

## 2021-07-26 NOTE — PATIENT INSTRUCTIONS
We are here to help you manage your diabetes.  Please continue with your primary care physician/provider for your routine health care maintenance   Please do your labs - I will check the VIT B 12   Your A1C: 7.0% (last A1C 6.5%)   This is increased slightly Blood sugars greater than 200 are not acceptable to reach your goal of improving diabetes    Watch for low blood sugars: (less than 70 )  Symptoms of low blood sugar:   · Shakiness or dizziness  · Cold, clammy skin or sweating  · Feeling hungry  · Headac safe prescribing. If you are due for labs, you will have 30 days to complete the  requested labs before future refills are authorized.    · In the event that your preferred pharmacy does not have the requested medication in stock (e.g. Backordered), it is y

## 2021-08-02 ENCOUNTER — TELEPHONE (OUTPATIENT)
Dept: PHYSICAL THERAPY | Facility: HOSPITAL | Age: 71
End: 2021-08-02

## 2021-08-03 ENCOUNTER — OFFICE VISIT (OUTPATIENT)
Dept: PHYSICAL THERAPY | Facility: HOSPITAL | Age: 71
End: 2021-08-03
Attending: PHYSICAL MEDICINE & REHABILITATION
Payer: MEDICARE

## 2021-08-03 PROCEDURE — 97161 PT EVAL LOW COMPLEX 20 MIN: CPT

## 2021-08-03 PROCEDURE — 97110 THERAPEUTIC EXERCISES: CPT

## 2021-08-04 NOTE — PROGRESS NOTES
SPINE EVALUATION:   Referring Physician: Dr. Danna Clarke  Diagnosis: Left lumbar radiculitis (M54.16)     Date of Service: 8/3/2021     PATIENT SUMMARY   Caden Moss is a 80year old male who presents to therapy today with complaints of L buttock pain.  Pa unspecified type diabetes mellitus without mention of complication, not stated as uncontrolled      Pt denies diplopia, dysarthria, dysphasia, dizziness, drop attacks, bowel/bladder changes, saddle anesthesia, and SHANICE LE N/T.    ASSESSMENT  Behrooz present Thoracic Lumbar Rotation and Extension - 2-3 x daily - 7 x weekly - 10 reps - 5 sec hold    Pt education was provided on exam findings, treatment diagnosis, treatment plan, expectations, and prognosis.  Pt was also provided recommendations for importance of Date____________________    Certification From: 9/0/2788  To:11/1/2021

## 2021-08-10 ENCOUNTER — OFFICE VISIT (OUTPATIENT)
Dept: PHYSICAL THERAPY | Facility: HOSPITAL | Age: 71
End: 2021-08-10
Attending: PHYSICAL MEDICINE & REHABILITATION
Payer: MEDICARE

## 2021-08-10 PROCEDURE — 97110 THERAPEUTIC EXERCISES: CPT

## 2021-08-10 NOTE — PROGRESS NOTES
Dx: Left lumbar radiculitis (M54.16)             Authorized # of Visits:   10         Next MD visit: none scheduled  Fall Risk: standard         Precautions: n/a             Subjective: Patient reports that he is slightly better.  Stretches were painful but

## 2021-08-16 ENCOUNTER — OFFICE VISIT (OUTPATIENT)
Dept: PHYSICAL THERAPY | Facility: HOSPITAL | Age: 71
End: 2021-08-16
Attending: PHYSICAL MEDICINE & REHABILITATION
Payer: MEDICARE

## 2021-08-16 PROCEDURE — 97110 THERAPEUTIC EXERCISES: CPT

## 2021-08-16 NOTE — PROGRESS NOTES
Dx: Left lumbar radiculitis (M54.16)             Authorized # of Visits:   10         Next MD visit: none scheduled  Fall Risk: standard         Precautions: n/a             Subjective: Patient reports that pain continues to improve overall.  He does get pa Hook lying abdominal isometric Lumbar shift against wall        Lateral step down, 6\" step, 10 Single leg hip abduction, yellow TB, 2x10        seated thoracic extension over chair Gait training, 5 min        Hook lying lumbar rotation stretch S/l lumba

## 2021-08-18 ENCOUNTER — OFFICE VISIT (OUTPATIENT)
Dept: PHYSICAL THERAPY | Facility: HOSPITAL | Age: 71
End: 2021-08-18
Attending: PHYSICAL MEDICINE & REHABILITATION
Payer: MEDICARE

## 2021-08-18 PROCEDURE — 97110 THERAPEUTIC EXERCISES: CPT

## 2021-08-18 NOTE — PROGRESS NOTES
Dx: Left lumbar radiculitis (M54.16)             Authorized # of Visits:   10         Next MD visit: none scheduled  Fall Risk: standard         Precautions: n/a             Subjective: Patient reports that he has a mild increase in discomfort after doing isometric Lumbar shift against wall RDL, 3x5       Lateral step down, 6\" step, 10 Single leg hip abduction, yellow TB, 2x10 modified SLS w/ trunk side flexion, 8#       seated thoracic extension over chair Gait training, 5 min Gait trainingw/ video feed b

## 2021-08-23 ENCOUNTER — OFFICE VISIT (OUTPATIENT)
Dept: PHYSICAL THERAPY | Facility: HOSPITAL | Age: 71
End: 2021-08-23
Attending: PHYSICAL MEDICINE & REHABILITATION
Payer: MEDICARE

## 2021-08-23 PROCEDURE — 97110 THERAPEUTIC EXERCISES: CPT

## 2021-08-23 NOTE — PROGRESS NOTES
Dx: Left lumbar radiculitis (M54.16)             Authorized # of Visits:   10         Next MD visit: none scheduled  Fall Risk: standard         Precautions: n/a             Subjective: Patient reports that HS stretching is effective.  He has been able to c on step, 3x20 sec stationary bike, lv 8 4 min stationary bike, lv 8 4 min      FR thoracic extension Supine HS stretch, 3x30 sec Supine HS stretch, 4x30 sec Supine HS stretch, 4x30 sec      Hook lying abdominal isometric Lumbar shift against wall RDL, 3x5

## 2021-08-27 ENCOUNTER — OFFICE VISIT (OUTPATIENT)
Dept: PHYSICAL THERAPY | Facility: HOSPITAL | Age: 71
End: 2021-08-27
Attending: PHYSICAL MEDICINE & REHABILITATION
Payer: MEDICARE

## 2021-08-27 PROCEDURE — 97110 THERAPEUTIC EXERCISES: CPT

## 2021-08-27 RX ORDER — LISINOPRIL 20 MG/1
TABLET ORAL
Qty: 90 TABLET | Refills: 0 | Status: SHIPPED | OUTPATIENT
Start: 2021-08-27 | End: 2021-11-23

## 2021-08-27 NOTE — TELEPHONE ENCOUNTER
Been Following JL  Last OV 3/8/21  Last CPE 8/24/20  Last Labs A1c 7/26/21    Last Rx fill Lisinopril 20mg #90 0R 4/26/21    Future Appointments   Date Time Provider Bart Sarmiento   8/27/2021  3:30 PM Sheila Pena, PT [facility] PHYS [address]   8/31/20

## 2021-08-27 NOTE — PROGRESS NOTES
Dx: Left lumbar radiculitis (M54.16)             Authorized # of Visits:   10         Next MD visit: none scheduled  Fall Risk: standard         Precautions: n/a             Subjective: Patient states that he has almost no pain.  Only slight stiffness in th 4 min Dead lift, 30#, 2x10     FR thoracic extension Supine HS stretch, 3x30 sec Supine HS stretch, 4x30 sec Supine HS stretch, 4x30 sec Shuttle SLP, 50#, 2x15     Hook lying abdominal isometric Lumbar shift against wall RDL, 3x5 Clam plus, red TB, 2x8 tan

## 2021-08-31 ENCOUNTER — OFFICE VISIT (OUTPATIENT)
Dept: PHYSICAL THERAPY | Facility: HOSPITAL | Age: 71
End: 2021-08-31
Attending: PHYSICAL MEDICINE & REHABILITATION
Payer: MEDICARE

## 2021-08-31 PROCEDURE — 97110 THERAPEUTIC EXERCISES: CPT

## 2021-08-31 PROCEDURE — 97140 MANUAL THERAPY 1/> REGIONS: CPT

## 2021-08-31 NOTE — PROGRESS NOTES
Dx: Left lumbar radiculitis (M54.16)             Authorized # of Visits:   10         Next MD visit: none scheduled  Fall Risk: standard         Precautions: n/a             Subjective: Patient has a bit of stiffness in the AM that goes away after about 5 downward dog stretching    FR thoracic extension Supine HS stretch, 3x30 sec Supine HS stretch, 4x30 sec Supine HS stretch, 4x30 sec Shuttle SLP, 50#, 2x15 forward lunge    Hook lying abdominal isometric Lumbar shift against wall RDL, 3x5 Clam plus, red TB

## 2021-09-03 ENCOUNTER — OFFICE VISIT (OUTPATIENT)
Dept: PHYSICAL THERAPY | Facility: HOSPITAL | Age: 71
End: 2021-09-03
Attending: PHYSICAL MEDICINE & REHABILITATION
Payer: MEDICARE

## 2021-09-03 PROCEDURE — 97140 MANUAL THERAPY 1/> REGIONS: CPT

## 2021-09-03 PROCEDURE — 97110 THERAPEUTIC EXERCISES: CPT

## 2021-09-03 NOTE — PROGRESS NOTES
Dx: Left lumbar radiculitis (M54.16)             Authorized # of Visits:   10         Next MD visit: none scheduled  Fall Risk: standard         Precautions: n/a             Subjective: Patient has been able to do additional stretches at home with some moisés stretch, 4x30 sec Shuttle SLP, 50#, 2x15 forward lunge Cable side bend, 35#, 15   Hook lying abdominal isometric Lumbar shift against wall RDL, 3x5 Clam plus, red TB, 2x8 tandem walk w/ 5# shoulder abduction, 3 laps Floor<>stand transfer training Dual cabl

## 2021-09-07 RX ORDER — NATEGLINIDE 60 MG/1
TABLET, COATED ORAL
Qty: 90 TABLET | Refills: 0 | Status: SHIPPED | OUTPATIENT
Start: 2021-09-07 | End: 2021-10-27

## 2021-09-20 RX ORDER — ATORVASTATIN CALCIUM 10 MG/1
TABLET, FILM COATED ORAL
Qty: 90 TABLET | Refills: 0 | Status: SHIPPED | OUTPATIENT
Start: 2021-09-20 | End: 2021-12-23

## 2021-09-20 NOTE — TELEPHONE ENCOUNTER
Been Following JL  Last OV 3/8/21  Last CPE 8/24/20  Last Labs A1c 7/26/21    Last Rx fill Atorvastatin 10mg #90 1R 3/22/21    Future Appointments   Date Time Provider Bart Sarmiento   9/22/2021  8:30 AM Jeff Isidro, PT [facility] PHYS [address]   10/4/

## 2021-09-22 ENCOUNTER — OFFICE VISIT (OUTPATIENT)
Dept: PHYSICAL THERAPY | Facility: HOSPITAL | Age: 71
End: 2021-09-22
Attending: PHYSICAL MEDICINE & REHABILITATION
Payer: MEDICARE

## 2021-09-22 PROCEDURE — 97140 MANUAL THERAPY 1/> REGIONS: CPT

## 2021-09-22 PROCEDURE — 97110 THERAPEUTIC EXERCISES: CPT

## 2021-09-22 NOTE — TELEPHONE ENCOUNTER
Future Appointments   Date Time Provider Bart Sarmiento   10/11/2021 10:20 AM Elaine Her MD [facility]

## 2021-09-22 NOTE — PROGRESS NOTES
Dx: Left lumbar radiculitis (M54.16)             Authorized # of Visits:   10         Next MD visit: none scheduled  Fall Risk: standard         Precautions: n/a             Subjective: Patient reports that he feels like he has regressed somewhat since pre min stationary bike, lv 8 4 min Dead lift, 30#, 2x10 downward dog stretching Cable trunk rotation, 15#, 15 Seated lumbar flexion, [id number]   FR thoracic extension Supine HS stretch, 3x30 sec Supine HS stretch, 4x30 sec Supine HS stretch, 4x30 sec Shuttle

## 2021-09-22 NOTE — TELEPHONE ENCOUNTER
FWD to [facility]AILEY RIBEIRO [facility], please assist in scheduling appt per JL

## 2021-10-04 ENCOUNTER — OFFICE VISIT (OUTPATIENT)
Dept: PHYSICAL THERAPY | Facility: HOSPITAL | Age: 71
End: 2021-10-04
Attending: PHYSICAL MEDICINE & REHABILITATION
Payer: MEDICARE

## 2021-10-04 PROCEDURE — 97110 THERAPEUTIC EXERCISES: CPT

## 2021-10-04 NOTE — PROGRESS NOTES
Progress Summary  Pt has attended 10 visits in Physical Therapy.    Dx: Left lumbar radiculitis (M54.16)             Authorized # of Visits:   10         Next MD visit: none scheduled  Fall Risk: standard         Precautions: n/a             Subjective: Pa will demonstrate proper squat form with no increase in pain within 6 weeks in order to lift objects from low surfaces. (met)  4.  Patient will demonstrate improvement in FOTO score equal or greater than WILLIAM within 6 weeks in order to improve functional mobi stool weight shift R, 2x10 SB wall sit, 15    12# deadlift, 2x12 Walk w/ R hip flexion 2 laps Standing HS stretch with hip IR, 5x10 sec Standing HS stretch with hip IR, 5x10 sec  SLR w/ distraction, 5 min Bridge, 20 SB single wall partial sit, R 2x12; L 2x

## 2021-10-06 ENCOUNTER — LAB ENCOUNTER (OUTPATIENT)
Dept: LAB | Facility: HOSPITAL | Age: 71
End: 2021-10-06
Attending: INTERNAL MEDICINE
Payer: MEDICARE

## 2021-10-06 ENCOUNTER — LAB ENCOUNTER (OUTPATIENT)
Dept: LAB | Facility: HOSPITAL | Age: 71
End: 2021-10-06
Attending: NURSE PRACTITIONER
Payer: MEDICARE

## 2021-10-06 DIAGNOSIS — E78.49 OTHER HYPERLIPIDEMIA: ICD-10-CM

## 2021-10-06 DIAGNOSIS — E11.65 TYPE 2 DIABETES MELLITUS WITH HYPERGLYCEMIA, WITHOUT LONG-TERM CURRENT USE OF INSULIN (HCC): ICD-10-CM

## 2021-10-06 DIAGNOSIS — I10 ESSENTIAL HYPERTENSION: ICD-10-CM

## 2021-10-06 DIAGNOSIS — Z12.5 SCREENING FOR MALIGNANT NEOPLASM OF PROSTATE: ICD-10-CM

## 2021-10-06 DIAGNOSIS — E11.9 TYPE 2 DIABETES MELLITUS WITHOUT COMPLICATION, WITHOUT LONG-TERM CURRENT USE OF INSULIN (HCC): ICD-10-CM

## 2021-10-06 DIAGNOSIS — Z00.00 ROUTINE GENERAL MEDICAL EXAMINATION AT A HEALTH CARE FACILITY: ICD-10-CM

## 2021-10-06 PROCEDURE — 82043 UR ALBUMIN QUANTITATIVE: CPT

## 2021-10-06 PROCEDURE — 36415 COLL VENOUS BLD VENIPUNCTURE: CPT

## 2021-10-06 PROCEDURE — 82607 VITAMIN B-12: CPT

## 2021-10-06 PROCEDURE — 82570 ASSAY OF URINE CREATININE: CPT

## 2021-10-06 PROCEDURE — 83036 HEMOGLOBIN GLYCOSYLATED A1C: CPT

## 2021-10-06 PROCEDURE — 80053 COMPREHEN METABOLIC PANEL: CPT

## 2021-10-06 PROCEDURE — 85025 COMPLETE CBC W/AUTO DIFF WBC: CPT

## 2021-10-06 PROCEDURE — 80061 LIPID PANEL: CPT

## 2021-10-11 ENCOUNTER — OFFICE VISIT (OUTPATIENT)
Dept: INTERNAL MEDICINE CLINIC | Facility: CLINIC | Age: 71
End: 2021-10-11
Payer: MEDICARE

## 2021-10-11 VITALS
HEIGHT: 69.49 IN | HEART RATE: 76 BPM | TEMPERATURE: 98 F | DIASTOLIC BLOOD PRESSURE: 70 MMHG | SYSTOLIC BLOOD PRESSURE: 108 MMHG | BODY MASS INDEX: 27.51 KG/M2 | WEIGHT: 190 LBS

## 2021-10-11 DIAGNOSIS — E78.49 OTHER HYPERLIPIDEMIA: ICD-10-CM

## 2021-10-11 DIAGNOSIS — Z00.00 ROUTINE GENERAL MEDICAL EXAMINATION AT A HEALTH CARE FACILITY: ICD-10-CM

## 2021-10-11 DIAGNOSIS — G47.33 OSA (OBSTRUCTIVE SLEEP APNEA): Primary | ICD-10-CM

## 2021-10-11 DIAGNOSIS — R35.0 URINARY FREQUENCY: ICD-10-CM

## 2021-10-11 DIAGNOSIS — E11.9 TYPE 2 DIABETES MELLITUS WITHOUT COMPLICATION, WITHOUT LONG-TERM CURRENT USE OF INSULIN (HCC): ICD-10-CM

## 2021-10-11 DIAGNOSIS — I10 ESSENTIAL HYPERTENSION: ICD-10-CM

## 2021-10-11 PROCEDURE — G0008 ADMIN INFLUENZA VIRUS VAC: HCPCS | Performed by: INTERNAL MEDICINE

## 2021-10-11 PROCEDURE — 81003 URINALYSIS AUTO W/O SCOPE: CPT | Performed by: INTERNAL MEDICINE

## 2021-10-11 PROCEDURE — 90662 IIV NO PRSV INCREASED AG IM: CPT | Performed by: INTERNAL MEDICINE

## 2021-10-11 PROCEDURE — 99213 OFFICE O/P EST LOW 20 MIN: CPT | Performed by: INTERNAL MEDICINE

## 2021-10-11 PROCEDURE — G0439 PPPS, SUBSEQ VISIT: HCPCS | Performed by: INTERNAL MEDICINE

## 2021-10-11 NOTE — PROGRESS NOTES
Subjective:   Patient ID: Sienna Abraham is a 80year old male.     HPI  Here for awv, states had itching after flu shot here years ago but in reviewing it was a pneumonia vaccine which he had itching after so he does want flu shot, sugars high, co urinary MG TOTAL) BY MOUTH 2 (TWO) TIMES DAILY AS NEEDED FOR PAIN.  60 capsule 0   • Glucose Blood (ACCU-CHEK GUIDE) In Vitro Strip Test 1 x daily 100 strip 3   • cyanocobalamin 1000 MCG/ML Injection Solution      • latanoprost 0.005 % Ophthalmic Solution      • Gl fordetails  Type 2 diabetes mellitus without complication, without long-term current use of insulin (HCC)-fu with CB to discuss GLP1 vs basal insulin in a few months  Essential hypertension-cont meds  Other hyperlipidemia-cont meds  Urinary frequency-ua an

## 2021-10-14 ENCOUNTER — TELEPHONE (OUTPATIENT)
Dept: PHYSICAL THERAPY | Facility: HOSPITAL | Age: 71
End: 2021-10-14

## 2021-10-14 RX ORDER — DAPAGLIFLOZIN 10 MG/1
TABLET, FILM COATED ORAL
Qty: 90 TABLET | Refills: 0 | Status: SHIPPED | OUTPATIENT
Start: 2021-10-14 | End: 2022-01-03

## 2021-10-14 NOTE — TELEPHONE ENCOUNTER
Requested Prescriptions     Pending Prescriptions Disp Refills   • FARXIGA 10 MG Oral Tab [Pharmacy Med Name: FARXIGA 10 MG TABLET] 90 tablet 0     Sig: TAKE 1 TABLET BY MOUTH EVERY DAY IN THE MORNING       LOV:7/26/21  FOV:10/27/21    Refill:7/15/21    a1

## 2021-10-18 ENCOUNTER — APPOINTMENT (OUTPATIENT)
Dept: PHYSICAL THERAPY | Facility: HOSPITAL | Age: 71
End: 2021-10-18
Attending: PHYSICAL MEDICINE & REHABILITATION
Payer: MEDICARE

## 2021-10-27 ENCOUNTER — TELEPHONE (OUTPATIENT)
Dept: ENDOCRINOLOGY CLINIC | Facility: CLINIC | Age: 71
End: 2021-10-27

## 2021-10-27 ENCOUNTER — OFFICE VISIT (OUTPATIENT)
Dept: ENDOCRINOLOGY CLINIC | Facility: CLINIC | Age: 71
End: 2021-10-27
Payer: MEDICARE

## 2021-10-27 VITALS
SYSTOLIC BLOOD PRESSURE: 114 MMHG | WEIGHT: 191 LBS | OXYGEN SATURATION: 98 % | BODY MASS INDEX: 28 KG/M2 | RESPIRATION RATE: 18 BRPM | HEART RATE: 70 BPM | DIASTOLIC BLOOD PRESSURE: 56 MMHG

## 2021-10-27 DIAGNOSIS — E78.5 DYSLIPIDEMIA: ICD-10-CM

## 2021-10-27 DIAGNOSIS — E11.65 TYPE 2 DIABETES MELLITUS WITH HYPERGLYCEMIA, WITHOUT LONG-TERM CURRENT USE OF INSULIN (HCC): Primary | ICD-10-CM

## 2021-10-27 DIAGNOSIS — I10 ESSENTIAL HYPERTENSION: ICD-10-CM

## 2021-10-27 PROCEDURE — 99214 OFFICE O/P EST MOD 30 MIN: CPT | Performed by: NURSE PRACTITIONER

## 2021-10-27 RX ORDER — GLIPIZIDE 2.5 MG/1
2.5 TABLET, EXTENDED RELEASE ORAL
Qty: 30 TABLET | Refills: 1 | Status: SHIPPED | OUTPATIENT
Start: 2021-10-27 | End: 2021-11-26

## 2021-10-27 NOTE — PATIENT INSTRUCTIONS
We are here to help you manage your diabetes.  Please continue with your primary care physician/provider for your routine health care maintenance     Your A1C: 7.3%   This has increased for you and we will continue to work together on lowering your blood valerio sugar) enters the blood more slowly   4. By making you feel full which helps decrease the amount of food that you eat.      Common side effects:   Nausea or diarrhea   These effects usually go away over time as your body gets used to the medicine heartbeat  · Weakness  · Confusion or irritability  · Blurred eyesight  · Having nightmares or waking up confused or sweating  · Numbness or tingling in the lips or tongue    Treatment of Low Blood sugar Action Plan  1.  Check blood glucose to be sure that another pharmacy that has the requested medication available.   We will gladly send a new prescription to that pharmacy at your request.     Thank you   Dev Walker   [address] [place] [address] doctors    Dr Rodo Noe  Phone: [phone]

## 2021-10-27 NOTE — TELEPHONE ENCOUNTER
Received fax from [facility] on diabetic eye exam that was on 8/7/2021.    Pt has no retinopathy abstracted 10/27/21

## 2021-10-27 NOTE — PROGRESS NOTES
Patient presents with:  Diabetes: pt brought meter       Smita Harmon is a 80year old male presents for type 2 DM medication management. Roslyn Travis MD, primary care physician    In the past 3m, his diabetes control has increased slightly.   Most recent A mg/dL 101 (H)       DM Complications:  Microvascular:   Neuropathy: no  Retinopathy: no  Nephropathy: no    Macrovascular:  PVD: no  CAD: no  Stroke/CVA: no    Modifying factors:  Medication adherence: yes   Recent steroids, illness or infections: (past 3m BY MOUTH 2 (TWO) TIMES DAILY AS NEEDED FOR PAIN.  60 capsule 0   • cyanocobalamin 1000 MCG/ML Injection Solution      • latanoprost 0.005 % Ophthalmic Solution      • MEMANTINE HCL 10 MG Oral Tab TAKE 1 TABLET BY MOUTH TWICE A DAY 60 tablet 0   • Calcium Ca normal.  Bilateral monofilament/sensation of both feet is normal. Vibration to dorsum to the first toe perceived.    Bilateral 2+ pedal pulse pedal pulse exam       Assessment/Plan:    Hypertension  Well controlled today   CPM .      Dyslipidemia  Last Lab rx.   Last dilated eye exam: Last Dilated Eye Exam: 12/16/20 [facility] EYE ) Exam shows retinopathy?  Eye Exam shows Diabetic Retinopathy?: No  Last diabetic foot exam: Last Foot Exam: 01/25/21  Date of last PHQ-2 depression screen: PHQ-2 - Date of last depr

## 2021-11-19 RX ORDER — SITAGLIPTIN 100 MG/1
TABLET, FILM COATED ORAL
Qty: 90 TABLET | Refills: 1 | Status: SHIPPED | OUTPATIENT
Start: 2021-11-19

## 2021-11-19 NOTE — TELEPHONE ENCOUNTER
Requested Prescriptions     Pending Prescriptions Disp Refills   • JANUVIA 100 MG Oral Tab [Pharmacy Med Name: Jaya Barrientos 100 MG TABLET] 90 tablet 1     Sig: TAKE 1 TABLET BY MOUTH EVERY DAY     FILLED- 06/17/21 filled by Dr. Kaur Wilkes- 10/27/21  FOV- 87

## 2021-11-23 RX ORDER — LISINOPRIL 20 MG/1
TABLET ORAL
Qty: 90 TABLET | Refills: 0 | Status: SHIPPED | OUTPATIENT
Start: 2021-11-23

## 2021-11-23 NOTE — TELEPHONE ENCOUNTER
PASSED per protocol, refill sent.   Last PE 10.11.21   Future Appointments   Date Time Provider Bart Sarmiento   12/14/2021 12:20 PM Guadlupe Crigler [facility]RA CEVALLOS   12/14/2021  1:45 PM Randi Robert MD [facility]   3/1/2022 10:45 AM Camilo Donaldson

## 2021-11-26 RX ORDER — GLIPIZIDE 2.5 MG/1
TABLET, EXTENDED RELEASE ORAL
Qty: 30 TABLET | Refills: 1 | Status: SHIPPED | OUTPATIENT
Start: 2021-11-26 | End: 2021-12-22

## 2021-11-26 NOTE — TELEPHONE ENCOUNTER
Requested Prescriptions     Pending Prescriptions Disp Refills   • GLIPIZIDE ER 2.5 MG Oral Tablet 24 Hr [Pharmacy Med Name: GLIPIZIDE ER 2.5 MG TABLET] 30 tablet 1     Sig: TAKE 1 TABLET BY MOUTH DAILY WITH BREAKFAST.        LOV 10/27/21  FOV 3/1/22  Refil

## 2021-12-06 NOTE — TELEPHONE ENCOUNTER
Requested Prescriptions     Pending Prescriptions Disp Refills   • NATEGLINIDE 60 MG Oral Tab [Pharmacy Med Name: NATEGLINIDE 60 MG TABLET] 90 tablet 0     Sig: TAKE 1 TABLET BY MOUTH EVERY DAY BEFORE DINNER.      FILLED- 9/7/21  LOV- 10/27/21  FOV- 3/1/22

## 2021-12-06 NOTE — TELEPHONE ENCOUNTER
Per last office note, nateglinide was stopped  Taking glipizide ER 2.5mg  once daily   Please confirm w pt and contact pharmacy to cx rx

## 2021-12-17 RX ORDER — NATEGLINIDE 60 MG/1
TABLET, COATED ORAL
Qty: 90 TABLET | Refills: 0 | OUTPATIENT
Start: 2021-12-17

## 2021-12-22 RX ORDER — GLIPIZIDE 2.5 MG/1
2.5 TABLET, EXTENDED RELEASE ORAL
Qty: 90 TABLET | Refills: 1 | Status: SHIPPED | OUTPATIENT
Start: 2021-12-22 | End: 2022-06-14 | Stop reason: ALTCHOICE

## 2021-12-23 RX ORDER — ATORVASTATIN CALCIUM 10 MG/1
TABLET, FILM COATED ORAL
Qty: 90 TABLET | Refills: 0 | Status: SHIPPED | OUTPATIENT
Start: 2021-12-23

## 2022-01-03 RX ORDER — DAPAGLIFLOZIN 10 MG/1
TABLET, FILM COATED ORAL
Qty: 90 TABLET | Refills: 0 | Status: SHIPPED | OUTPATIENT
Start: 2022-01-03

## 2022-01-03 NOTE — TELEPHONE ENCOUNTER
Requested Prescriptions     Pending Prescriptions Disp Refills   • FARXIGA 10 MG Oral Tab [Pharmacy Med Name: FARXIGA 10 MG TABLET] 90 tablet 0     Sig: TAKE 1 TABLET BY MOUTH EVERY DAY IN THE MORNING     FILLED- 10/14/21  LOV- 10/27/21  FOV- 3/1/22  LAST

## 2022-02-09 RX ORDER — LISINOPRIL 20 MG/1
TABLET ORAL
Qty: 90 TABLET | Refills: 0 | Status: SHIPPED | OUTPATIENT
Start: 2022-02-09

## 2022-03-01 ENCOUNTER — TELEPHONE (OUTPATIENT)
Dept: ENDOCRINOLOGY CLINIC | Facility: CLINIC | Age: 72
End: 2022-03-01

## 2022-03-01 ENCOUNTER — OFFICE VISIT (OUTPATIENT)
Dept: ENDOCRINOLOGY CLINIC | Facility: CLINIC | Age: 72
End: 2022-03-01
Payer: MEDICARE

## 2022-03-01 VITALS
SYSTOLIC BLOOD PRESSURE: 130 MMHG | OXYGEN SATURATION: 98 % | HEART RATE: 85 BPM | DIASTOLIC BLOOD PRESSURE: 66 MMHG | BODY MASS INDEX: 29 KG/M2 | WEIGHT: 197.19 LBS | RESPIRATION RATE: 18 BRPM

## 2022-03-01 DIAGNOSIS — I10 ESSENTIAL HYPERTENSION: ICD-10-CM

## 2022-03-01 DIAGNOSIS — E78.5 DYSLIPIDEMIA: ICD-10-CM

## 2022-03-01 DIAGNOSIS — E11.65 TYPE 2 DIABETES MELLITUS WITH HYPERGLYCEMIA, WITHOUT LONG-TERM CURRENT USE OF INSULIN (HCC): Primary | ICD-10-CM

## 2022-03-01 LAB
CARTRIDGE LOT#: 899 NUMERIC
HEMOGLOBIN A1C: 7.1 % (ref 4.3–5.6)

## 2022-03-01 PROCEDURE — 99214 OFFICE O/P EST MOD 30 MIN: CPT | Performed by: NURSE PRACTITIONER

## 2022-03-01 PROCEDURE — 83036 HEMOGLOBIN GLYCOSYLATED A1C: CPT | Performed by: NURSE PRACTITIONER

## 2022-03-01 NOTE — TELEPHONE ENCOUNTER
Called for pt diabetic eye exam at [facility] spoke to Kalyan Chau and she is faxing over form

## 2022-03-02 NOTE — TELEPHONE ENCOUNTER
Received fax pt has no retinopathy in either eyes.  Sent to scan exam date 12/7/21 abstracted 3/2/22

## 2022-03-24 RX ORDER — ATORVASTATIN CALCIUM 10 MG/1
TABLET, FILM COATED ORAL
Qty: 90 TABLET | Refills: 0 | Status: SHIPPED | OUTPATIENT
Start: 2022-03-24 | End: 2022-03-25

## 2022-03-24 NOTE — TELEPHONE ENCOUNTER
Last visit- 10/11/2021     Last refill- 12/23/2021 atorvastatin 10mg QTY90 0R    Last labs- 10/06/2021 hemolobin a1c, psa screen, lipid, cmp, cbc  Future Appointments   Date Time Provider Bart Guillermina   3/25/2022  1:40 PM Dedrick Mendoza MD [facility]   4/18/2022  1:30 PM Marilin Ray MD [facility]   7/12/2022  2:30 PM Rekha Carrillo APRN [facility] SIL       Per Protocol- Passed

## 2022-03-25 ENCOUNTER — OFFICE VISIT (OUTPATIENT)
Dept: INTERNAL MEDICINE CLINIC | Facility: CLINIC | Age: 72
End: 2022-03-25
Payer: MEDICARE

## 2022-03-25 VITALS
TEMPERATURE: 98 F | OXYGEN SATURATION: 98 % | RESPIRATION RATE: 16 BRPM | DIASTOLIC BLOOD PRESSURE: 78 MMHG | HEART RATE: 80 BPM | SYSTOLIC BLOOD PRESSURE: 132 MMHG | WEIGHT: 200 LBS | BODY MASS INDEX: 29.62 KG/M2 | HEIGHT: 69 IN

## 2022-03-25 DIAGNOSIS — E11.9 TYPE 2 DIABETES MELLITUS WITHOUT COMPLICATION, WITHOUT LONG-TERM CURRENT USE OF INSULIN (HCC): Primary | ICD-10-CM

## 2022-03-25 DIAGNOSIS — E16.2 HYPOGLYCEMIA: ICD-10-CM

## 2022-03-25 DIAGNOSIS — I10 ESSENTIAL HYPERTENSION: ICD-10-CM

## 2022-03-25 DIAGNOSIS — G89.29 CHRONIC BILATERAL LOW BACK PAIN WITHOUT SCIATICA: ICD-10-CM

## 2022-03-25 DIAGNOSIS — E78.49 OTHER HYPERLIPIDEMIA: ICD-10-CM

## 2022-03-25 DIAGNOSIS — M54.50 CHRONIC BILATERAL LOW BACK PAIN WITHOUT SCIATICA: ICD-10-CM

## 2022-03-25 PROCEDURE — 99214 OFFICE O/P EST MOD 30 MIN: CPT | Performed by: INTERNAL MEDICINE

## 2022-03-25 RX ORDER — ATORVASTATIN CALCIUM 20 MG/1
20 TABLET, FILM COATED ORAL NIGHTLY
Qty: 90 TABLET | Refills: 1 | Status: SHIPPED | OUTPATIENT
Start: 2022-03-25

## 2022-03-28 RX ORDER — DAPAGLIFLOZIN 10 MG/1
TABLET, FILM COATED ORAL
Qty: 90 TABLET | Refills: 0 | Status: SHIPPED | OUTPATIENT
Start: 2022-03-28

## 2022-04-12 ENCOUNTER — TELEPHONE (OUTPATIENT)
Dept: PHYSICAL THERAPY | Facility: HOSPITAL | Age: 72
End: 2022-04-12

## 2022-04-13 ENCOUNTER — OFFICE VISIT (OUTPATIENT)
Dept: PHYSICAL THERAPY | Facility: HOSPITAL | Age: 72
End: 2022-04-13
Attending: INTERNAL MEDICINE
Payer: MEDICARE

## 2022-04-13 DIAGNOSIS — M54.50 CHRONIC BILATERAL LOW BACK PAIN WITHOUT SCIATICA: ICD-10-CM

## 2022-04-13 DIAGNOSIS — G89.29 CHRONIC BILATERAL LOW BACK PAIN WITHOUT SCIATICA: ICD-10-CM

## 2022-04-13 PROCEDURE — 97110 THERAPEUTIC EXERCISES: CPT

## 2022-04-13 PROCEDURE — 97161 PT EVAL LOW COMPLEX 20 MIN: CPT

## 2022-04-15 ENCOUNTER — APPOINTMENT (OUTPATIENT)
Dept: PHYSICAL THERAPY | Facility: HOSPITAL | Age: 72
End: 2022-04-15
Attending: INTERNAL MEDICINE
Payer: MEDICARE

## 2022-04-20 ENCOUNTER — OFFICE VISIT (OUTPATIENT)
Dept: PHYSICAL THERAPY | Facility: HOSPITAL | Age: 72
End: 2022-04-20
Attending: INTERNAL MEDICINE
Payer: MEDICARE

## 2022-04-20 PROCEDURE — 97110 THERAPEUTIC EXERCISES: CPT

## 2022-04-27 ENCOUNTER — OFFICE VISIT (OUTPATIENT)
Dept: PHYSICAL THERAPY | Facility: HOSPITAL | Age: 72
End: 2022-04-27
Attending: INTERNAL MEDICINE
Payer: MEDICARE

## 2022-04-27 PROCEDURE — 97110 THERAPEUTIC EXERCISES: CPT

## 2022-05-03 ENCOUNTER — OFFICE VISIT (OUTPATIENT)
Dept: PHYSICAL THERAPY | Facility: HOSPITAL | Age: 72
End: 2022-05-03
Attending: INTERNAL MEDICINE
Payer: MEDICARE

## 2022-05-03 PROCEDURE — 97110 THERAPEUTIC EXERCISES: CPT

## 2022-05-03 PROCEDURE — 97140 MANUAL THERAPY 1/> REGIONS: CPT

## 2022-05-04 ENCOUNTER — APPOINTMENT (OUTPATIENT)
Dept: PHYSICAL THERAPY | Facility: HOSPITAL | Age: 72
End: 2022-05-04
Attending: INTERNAL MEDICINE
Payer: MEDICARE

## 2022-05-10 ENCOUNTER — OFFICE VISIT (OUTPATIENT)
Dept: PHYSICAL THERAPY | Facility: HOSPITAL | Age: 72
End: 2022-05-10
Attending: INTERNAL MEDICINE
Payer: MEDICARE

## 2022-05-10 PROCEDURE — 97140 MANUAL THERAPY 1/> REGIONS: CPT

## 2022-05-10 PROCEDURE — 97110 THERAPEUTIC EXERCISES: CPT

## 2022-05-10 RX ORDER — LISINOPRIL 20 MG/1
TABLET ORAL
Qty: 90 TABLET | Refills: 0 | Status: SHIPPED | OUTPATIENT
Start: 2022-05-10

## 2022-05-10 NOTE — TELEPHONE ENCOUNTER
PASSED per protocol, refill sent.     Last PE: 10--JL-Wellness    Future Appointments   Date Time Provider Bart Sarmiento   5/10/2022  7:00 PM Herschel Spatz, PT [address] [facility] PHYS [address]   5/13/2022 11:00 AM Herschel Spatz, PT [address] [facility] PHYS [facility] AT [facility] AT [facility]   5/17/2022  3:15 PM Herschel Spatz, PT [address] [facility] PHYS [address]   5/20/2022  1:15 PM Herschel Spatz, PT [address] [facility] PHYS [address]   5/24/2022  1:45 PM Herschel Spatz, PT [address] [facility] PHYS [address]   6/14/2022  2:30 PM PAWEL Zapata [facility] S[facility]   7/6/2022  2:40 PM Joe Andrade MD [facility]   7/12/2022  2:30 PM Lali Carrillo APRN [facility] SIL

## 2022-05-13 ENCOUNTER — APPOINTMENT (OUTPATIENT)
Dept: PHYSICAL THERAPY | Facility: HOSPITAL | Age: 72
End: 2022-05-13
Attending: INTERNAL MEDICINE
Payer: MEDICARE

## 2022-05-16 RX ORDER — SITAGLIPTIN 100 MG/1
TABLET, FILM COATED ORAL
Qty: 90 TABLET | Refills: 1 | Status: SHIPPED | OUTPATIENT
Start: 2022-05-16

## 2022-05-17 ENCOUNTER — APPOINTMENT (OUTPATIENT)
Dept: PHYSICAL THERAPY | Facility: HOSPITAL | Age: 72
End: 2022-05-17
Attending: INTERNAL MEDICINE
Payer: MEDICARE

## 2022-05-20 ENCOUNTER — OFFICE VISIT (OUTPATIENT)
Dept: PHYSICAL THERAPY | Facility: HOSPITAL | Age: 72
End: 2022-05-20
Attending: INTERNAL MEDICINE
Payer: MEDICARE

## 2022-05-20 PROCEDURE — 97110 THERAPEUTIC EXERCISES: CPT

## 2022-05-24 ENCOUNTER — OFFICE VISIT (OUTPATIENT)
Dept: PHYSICAL THERAPY | Facility: HOSPITAL | Age: 72
End: 2022-05-24
Attending: INTERNAL MEDICINE
Payer: MEDICARE

## 2022-05-24 PROCEDURE — 97110 THERAPEUTIC EXERCISES: CPT

## 2022-06-14 ENCOUNTER — OFFICE VISIT (OUTPATIENT)
Dept: ENDOCRINOLOGY CLINIC | Facility: CLINIC | Age: 72
End: 2022-06-14
Payer: MEDICARE

## 2022-06-14 VITALS
HEART RATE: 71 BPM | RESPIRATION RATE: 17 BRPM | BODY MASS INDEX: 29 KG/M2 | WEIGHT: 198.63 LBS | OXYGEN SATURATION: 98 % | SYSTOLIC BLOOD PRESSURE: 126 MMHG | DIASTOLIC BLOOD PRESSURE: 64 MMHG

## 2022-06-14 DIAGNOSIS — E11.65 TYPE 2 DIABETES MELLITUS WITH HYPERGLYCEMIA, WITHOUT LONG-TERM CURRENT USE OF INSULIN (HCC): Primary | ICD-10-CM

## 2022-06-14 DIAGNOSIS — I10 ESSENTIAL HYPERTENSION: ICD-10-CM

## 2022-06-14 DIAGNOSIS — E78.5 DYSLIPIDEMIA: ICD-10-CM

## 2022-06-14 LAB
CARTRIDGE LOT#: 970 NUMERIC
HEMOGLOBIN A1C: 7.2 % (ref 4.3–5.6)

## 2022-06-14 PROCEDURE — 99214 OFFICE O/P EST MOD 30 MIN: CPT | Performed by: NURSE PRACTITIONER

## 2022-06-14 PROCEDURE — 83036 HEMOGLOBIN GLYCOSYLATED A1C: CPT | Performed by: NURSE PRACTITIONER

## 2022-06-14 RX ORDER — SEMAGLUTIDE 1.34 MG/ML
0.5 INJECTION, SOLUTION SUBCUTANEOUS WEEKLY
Qty: 4.5 ML | Refills: 1 | Status: SHIPPED | OUTPATIENT
Start: 2022-06-14

## 2022-06-20 RX ORDER — ATORVASTATIN CALCIUM 10 MG/1
TABLET, FILM COATED ORAL
Qty: 90 TABLET | Refills: 0 | OUTPATIENT
Start: 2022-06-20

## 2022-06-27 ENCOUNTER — TELEPHONE (OUTPATIENT)
Dept: ENDOCRINOLOGY CLINIC | Facility: CLINIC | Age: 72
End: 2022-06-27

## 2022-06-27 RX ORDER — BLOOD SUGAR DIAGNOSTIC
1 STRIP MISCELLANEOUS DAILY
Qty: 100 STRIP | Refills: 2 | Status: SHIPPED | OUTPATIENT
Start: 2022-06-27

## 2022-06-29 ENCOUNTER — TELEPHONE (OUTPATIENT)
Dept: ENDOCRINOLOGY CLINIC | Facility: CLINIC | Age: 72
End: 2022-06-29

## 2022-06-29 RX ORDER — BLOOD SUGAR DIAGNOSTIC
1 STRIP MISCELLANEOUS DAILY
Qty: 200 STRIP | Refills: 2 | Status: SHIPPED | OUTPATIENT
Start: 2022-06-29

## 2022-06-29 RX ORDER — GLIPIZIDE 2.5 MG/1
TABLET, EXTENDED RELEASE ORAL
Qty: 90 TABLET | Refills: 1 | OUTPATIENT
Start: 2022-06-29

## 2022-06-29 NOTE — TELEPHONE ENCOUNTER
[facility] will not allow exceptions for increase in testing freqency ; they  only follow medicare guidelines for recommended BG testing   1 x daily (no insulin)   3 x daily (if using insulin)     We can send rx to any other non [facility] pharmacy if he wants to continue 2 x daily testing     With the medication changes- I would recommend increase in BG checks

## 2022-06-29 NOTE — TELEPHONE ENCOUNTER
Received fax from [employer] [id number] stating due to patient insurance Acc-Chek test strips requires only once daily testing

## 2022-07-01 ENCOUNTER — LAB ENCOUNTER (OUTPATIENT)
Dept: LAB | Facility: HOSPITAL | Age: 72
End: 2022-07-01
Attending: INTERNAL MEDICINE
Payer: MEDICARE

## 2022-07-01 DIAGNOSIS — E11.9 TYPE 2 DIABETES MELLITUS WITHOUT COMPLICATION, WITHOUT LONG-TERM CURRENT USE OF INSULIN (HCC): ICD-10-CM

## 2022-07-01 LAB
ALBUMIN SERPL-MCNC: 3.7 G/DL (ref 3.4–5)
ALBUMIN/GLOB SERPL: 1 {RATIO} (ref 1–2)
ALP LIVER SERPL-CCNC: 30 U/L
ALT SERPL-CCNC: 29 U/L
ANION GAP SERPL CALC-SCNC: 4 MMOL/L (ref 0–18)
AST SERPL-CCNC: 17 U/L (ref 15–37)
BASOPHILS # BLD AUTO: 0.06 X10(3) UL (ref 0–0.2)
BASOPHILS NFR BLD AUTO: 0.8 %
BILIRUB SERPL-MCNC: 0.4 MG/DL (ref 0.1–2)
BUN BLD-MCNC: 17 MG/DL (ref 7–18)
CALCIUM BLD-MCNC: 9.2 MG/DL (ref 8.5–10.1)
CHLORIDE SERPL-SCNC: 107 MMOL/L (ref 98–112)
CHOLEST SERPL-MCNC: 144 MG/DL (ref ?–200)
CO2 SERPL-SCNC: 28 MMOL/L (ref 21–32)
CREAT BLD-MCNC: 0.96 MG/DL
CREAT UR-SCNC: 109 MG/DL
EOSINOPHIL # BLD AUTO: 0.16 X10(3) UL (ref 0–0.7)
EOSINOPHIL NFR BLD AUTO: 2 %
ERYTHROCYTE [DISTWIDTH] IN BLOOD BY AUTOMATED COUNT: 13 %
FASTING PATIENT LIPID ANSWER: YES
FASTING STATUS PATIENT QL REPORTED: YES
GLOBULIN PLAS-MCNC: 3.8 G/DL (ref 2.8–4.4)
GLUCOSE BLD-MCNC: 154 MG/DL (ref 70–99)
HCT VFR BLD AUTO: 45.9 %
HDLC SERPL-MCNC: 41 MG/DL (ref 40–59)
HGB BLD-MCNC: 15 G/DL
IMM GRANULOCYTES # BLD AUTO: 0.01 X10(3) UL (ref 0–1)
IMM GRANULOCYTES NFR BLD: 0.1 %
LDLC SERPL CALC-MCNC: 78 MG/DL (ref ?–100)
LYMPHOCYTES # BLD AUTO: 2.63 X10(3) UL (ref 1–4)
LYMPHOCYTES NFR BLD AUTO: 33.7 %
MCH RBC QN AUTO: 30.2 PG (ref 26–34)
MCHC RBC AUTO-ENTMCNC: 32.7 G/DL (ref 31–37)
MCV RBC AUTO: 92.4 FL
MICROALBUMIN UR-MCNC: 0.74 MG/DL
MICROALBUMIN/CREAT 24H UR-RTO: 6.8 UG/MG (ref ?–30)
MONOCYTES # BLD AUTO: 0.63 X10(3) UL (ref 0.1–1)
MONOCYTES NFR BLD AUTO: 8.1 %
NEUTROPHILS # BLD AUTO: 4.32 X10 (3) UL (ref 1.5–7.7)
NEUTROPHILS # BLD AUTO: 4.32 X10(3) UL (ref 1.5–7.7)
NEUTROPHILS NFR BLD AUTO: 55.3 %
NONHDLC SERPL-MCNC: 103 MG/DL (ref ?–130)
OSMOLALITY SERPL CALC.SUM OF ELEC: 293 MOSM/KG (ref 275–295)
PLATELET # BLD AUTO: 297 10(3)UL (ref 150–450)
POTASSIUM SERPL-SCNC: 4.4 MMOL/L (ref 3.5–5.1)
PROT SERPL-MCNC: 7.5 G/DL (ref 6.4–8.2)
RBC # BLD AUTO: 4.97 X10(6)UL
SODIUM SERPL-SCNC: 139 MMOL/L (ref 136–145)
TRIGL SERPL-MCNC: 140 MG/DL (ref 30–149)
VLDLC SERPL CALC-MCNC: 22 MG/DL (ref 0–30)
WBC # BLD AUTO: 7.8 X10(3) UL (ref 4–11)

## 2022-07-01 PROCEDURE — 82043 UR ALBUMIN QUANTITATIVE: CPT

## 2022-07-01 PROCEDURE — 80053 COMPREHEN METABOLIC PANEL: CPT

## 2022-07-01 PROCEDURE — 85025 COMPLETE CBC W/AUTO DIFF WBC: CPT

## 2022-07-01 PROCEDURE — 82570 ASSAY OF URINE CREATININE: CPT

## 2022-07-01 PROCEDURE — 80061 LIPID PANEL: CPT

## 2022-07-01 PROCEDURE — 36415 COLL VENOUS BLD VENIPUNCTURE: CPT

## 2022-07-06 ENCOUNTER — OFFICE VISIT (OUTPATIENT)
Dept: INTERNAL MEDICINE CLINIC | Facility: CLINIC | Age: 72
End: 2022-07-06
Payer: MEDICARE

## 2022-07-06 VITALS
DIASTOLIC BLOOD PRESSURE: 64 MMHG | BODY MASS INDEX: 28.88 KG/M2 | HEIGHT: 69 IN | RESPIRATION RATE: 16 BRPM | WEIGHT: 195 LBS | HEART RATE: 68 BPM | OXYGEN SATURATION: 97 % | TEMPERATURE: 97 F | SYSTOLIC BLOOD PRESSURE: 126 MMHG

## 2022-07-06 DIAGNOSIS — E78.49 OTHER HYPERLIPIDEMIA: ICD-10-CM

## 2022-07-06 DIAGNOSIS — I10 ESSENTIAL HYPERTENSION: Primary | ICD-10-CM

## 2022-07-06 DIAGNOSIS — E11.9 TYPE 2 DIABETES MELLITUS WITHOUT COMPLICATION, WITHOUT LONG-TERM CURRENT USE OF INSULIN (HCC): ICD-10-CM

## 2022-07-06 PROCEDURE — 99214 OFFICE O/P EST MOD 30 MIN: CPT | Performed by: INTERNAL MEDICINE

## 2022-07-27 ENCOUNTER — TELEPHONE (OUTPATIENT)
Dept: INTERNAL MEDICINE CLINIC | Facility: CLINIC | Age: 72
End: 2022-07-27

## 2022-07-27 ENCOUNTER — PATIENT MESSAGE (OUTPATIENT)
Dept: ENDOCRINOLOGY CLINIC | Facility: CLINIC | Age: 72
End: 2022-07-27

## 2022-07-27 RX ORDER — SEMAGLUTIDE 1.34 MG/ML
1 INJECTION, SOLUTION SUBCUTANEOUS WEEKLY
Qty: 9 ML | Refills: 1 | Status: SHIPPED | OUTPATIENT
Start: 2022-07-27

## 2022-07-27 RX ORDER — SEMAGLUTIDE 1.34 MG/ML
1 INJECTION, SOLUTION SUBCUTANEOUS WEEKLY
Qty: 3 ML | Refills: 2 | Status: SHIPPED | OUTPATIENT
Start: 2022-07-27 | End: 2022-07-27

## 2022-07-27 NOTE — TELEPHONE ENCOUNTER
Orders Placed This Encounter      Semaglutide, 1 MG/DOSE, (OZEMPIC, 1 MG/DOSE,) 4 MG/3ML Subcutaneous Solution Pen-injector          Sig: Inject 1 mg into the skin once a week.           Dispense:  3 mL          Refill:  2

## 2022-07-27 NOTE — TELEPHONE ENCOUNTER
Future Appointments   Date Time Provider Bart Sarmiento   12/23/2022 10:40 AM Danyel Kumar MD [facility]     Orders to    [facility]         aware must fast no call back required

## 2022-08-16 RX ORDER — LISINOPRIL 20 MG/1
TABLET ORAL
Qty: 90 TABLET | Refills: 0 | Status: SHIPPED | OUTPATIENT
Start: 2022-08-16

## 2022-09-26 RX ORDER — DAPAGLIFLOZIN 10 MG/1
TABLET, FILM COATED ORAL
Qty: 90 TABLET | Refills: 0 | Status: SHIPPED | OUTPATIENT
Start: 2022-09-26

## 2022-09-26 RX ORDER — ATORVASTATIN CALCIUM 20 MG/1
TABLET, FILM COATED ORAL
Qty: 90 TABLET | Refills: 0 | Status: SHIPPED | OUTPATIENT
Start: 2022-09-26

## 2022-09-26 NOTE — TELEPHONE ENCOUNTER
PASSED per protocol, refill sent.     Last PE--10--JL-Wellness      Future Appointments   Date Time Provider Bart Teixeirai   10/11/2022  3:15 PM PAWEL Zelaya [facility]   12/23/2022 10:40 AM Aj Clark MD [facility]

## 2022-10-11 ENCOUNTER — OFFICE VISIT (OUTPATIENT)
Dept: ENDOCRINOLOGY CLINIC | Facility: CLINIC | Age: 72
End: 2022-10-11
Payer: MEDICARE

## 2022-10-11 VITALS
OXYGEN SATURATION: 98 % | RESPIRATION RATE: 16 BRPM | HEART RATE: 74 BPM | WEIGHT: 187.38 LBS | DIASTOLIC BLOOD PRESSURE: 66 MMHG | SYSTOLIC BLOOD PRESSURE: 134 MMHG | BODY MASS INDEX: 28 KG/M2

## 2022-10-11 DIAGNOSIS — I10 ESSENTIAL HYPERTENSION: ICD-10-CM

## 2022-10-11 DIAGNOSIS — E78.5 DYSLIPIDEMIA: ICD-10-CM

## 2022-10-11 DIAGNOSIS — E11.65 TYPE 2 DIABETES MELLITUS WITH HYPERGLYCEMIA, WITHOUT LONG-TERM CURRENT USE OF INSULIN (HCC): Primary | ICD-10-CM

## 2022-10-11 PROCEDURE — 99214 OFFICE O/P EST MOD 30 MIN: CPT | Performed by: NURSE PRACTITIONER

## 2022-10-11 RX ORDER — BLOOD SUGAR DIAGNOSTIC
STRIP MISCELLANEOUS
Qty: 200 STRIP | Refills: 3 | Status: SHIPPED | OUTPATIENT
Start: 2022-10-11 | End: 2023-10-11

## 2022-10-11 RX ORDER — SEMAGLUTIDE 2.68 MG/ML
2 INJECTION, SOLUTION SUBCUTANEOUS WEEKLY
Qty: 9 ML | Refills: 1 | Status: SHIPPED | OUTPATIENT
Start: 2022-10-11

## 2022-11-14 RX ORDER — LISINOPRIL 20 MG/1
TABLET ORAL
Qty: 90 TABLET | Refills: 0 | Status: SHIPPED | OUTPATIENT
Start: 2022-11-14

## 2022-11-23 NOTE — TELEPHONE ENCOUNTER
Labs completed 7/22; did you want to do any labs before appt?     Your Appointments    Friday December 23, 2022 10:40 AM  Medicare Annual Well Visit with Eva French MD  [place] [address], [facility] ([facility] IM/FM [place]) [address] [facility] [address]  [phone]      Monday March 13, 2023  2:00 PM  Apn/Md Diabetic Follow Up with PAWEL [facility] ( [facility]) [address] [place] [address]  [phone]

## 2022-12-23 ENCOUNTER — OFFICE VISIT (OUTPATIENT)
Dept: INTERNAL MEDICINE CLINIC | Facility: CLINIC | Age: 72
End: 2022-12-23
Payer: MEDICARE

## 2022-12-23 VITALS
TEMPERATURE: 97 F | WEIGHT: 185 LBS | DIASTOLIC BLOOD PRESSURE: 76 MMHG | HEART RATE: 78 BPM | SYSTOLIC BLOOD PRESSURE: 132 MMHG | BODY MASS INDEX: 27.4 KG/M2 | HEIGHT: 69 IN | RESPIRATION RATE: 16 BRPM | OXYGEN SATURATION: 98 %

## 2022-12-23 DIAGNOSIS — I10 ESSENTIAL HYPERTENSION: ICD-10-CM

## 2022-12-23 DIAGNOSIS — Z00.00 ROUTINE GENERAL MEDICAL EXAMINATION AT A HEALTH CARE FACILITY: Primary | ICD-10-CM

## 2022-12-23 DIAGNOSIS — E78.49 OTHER HYPERLIPIDEMIA: ICD-10-CM

## 2022-12-23 DIAGNOSIS — G47.33 OSA (OBSTRUCTIVE SLEEP APNEA): ICD-10-CM

## 2022-12-23 DIAGNOSIS — K42.9 UMBILICAL HERNIA WITHOUT OBSTRUCTION AND WITHOUT GANGRENE: ICD-10-CM

## 2022-12-23 DIAGNOSIS — E11.9 TYPE 2 DIABETES MELLITUS WITHOUT COMPLICATION, WITHOUT LONG-TERM CURRENT USE OF INSULIN (HCC): ICD-10-CM

## 2022-12-23 PROCEDURE — 99214 OFFICE O/P EST MOD 30 MIN: CPT | Performed by: INTERNAL MEDICINE

## 2022-12-23 PROCEDURE — G0439 PPPS, SUBSEQ VISIT: HCPCS | Performed by: INTERNAL MEDICINE

## 2022-12-23 PROCEDURE — 1126F AMNT PAIN NOTED NONE PRSNT: CPT | Performed by: INTERNAL MEDICINE

## 2022-12-30 RX ORDER — ATORVASTATIN CALCIUM 20 MG/1
TABLET, FILM COATED ORAL
Qty: 90 TABLET | Refills: 0 | Status: SHIPPED | OUTPATIENT
Start: 2022-12-30

## 2023-01-03 RX ORDER — DAPAGLIFLOZIN 10 MG/1
TABLET, FILM COATED ORAL
Qty: 90 TABLET | Refills: 0 | Status: SHIPPED | OUTPATIENT
Start: 2023-01-03

## 2023-02-16 RX ORDER — LISINOPRIL 20 MG/1
TABLET ORAL
Qty: 90 TABLET | Refills: 0 | Status: SHIPPED | OUTPATIENT
Start: 2023-02-16

## 2023-02-16 NOTE — TELEPHONE ENCOUNTER
PASSED per protocol, refill sent.     Last PE: 12--jl     Future Appointments   Date Time Provider [facility] Guillermina   3/13/2023  2:00 PM Juliet Carrillo, PAWEL [facility] S[facility]

## 2023-03-13 ENCOUNTER — OFFICE VISIT (OUTPATIENT)
Dept: ENDOCRINOLOGY CLINIC | Facility: CLINIC | Age: 73
End: 2023-03-13
Payer: MEDICARE

## 2023-03-13 VITALS
RESPIRATION RATE: 18 BRPM | SYSTOLIC BLOOD PRESSURE: 136 MMHG | WEIGHT: 186.19 LBS | HEART RATE: 83 BPM | DIASTOLIC BLOOD PRESSURE: 72 MMHG | OXYGEN SATURATION: 98 % | BODY MASS INDEX: 28 KG/M2

## 2023-03-13 DIAGNOSIS — E78.5 DYSLIPIDEMIA: ICD-10-CM

## 2023-03-13 DIAGNOSIS — K59.00 CONSTIPATION, UNSPECIFIED CONSTIPATION TYPE: ICD-10-CM

## 2023-03-13 DIAGNOSIS — E11.65 TYPE 2 DIABETES MELLITUS WITH HYPERGLYCEMIA, WITHOUT LONG-TERM CURRENT USE OF INSULIN (HCC): Primary | ICD-10-CM

## 2023-03-13 DIAGNOSIS — I10 ESSENTIAL HYPERTENSION: ICD-10-CM

## 2023-03-13 LAB
CARTRIDGE LOT#: 301 NUMERIC
CREAT UR-SCNC: 27.7 MG/DL
GLUCOSE BLOOD: 150
HEMOGLOBIN A1C: 7.5 % (ref 4.3–5.6)
MICROALBUMIN UR-MCNC: 0.65 MG/DL
MICROALBUMIN/CREAT 24H UR-RTO: 23.5 UG/MG (ref ?–30)
TEST STRIP LOT #: NORMAL NUMERIC

## 2023-03-13 PROCEDURE — 82570 ASSAY OF URINE CREATININE: CPT | Performed by: NURSE PRACTITIONER

## 2023-03-13 PROCEDURE — 82043 UR ALBUMIN QUANTITATIVE: CPT | Performed by: NURSE PRACTITIONER

## 2023-03-13 RX ORDER — SEMAGLUTIDE 2.68 MG/ML
2 INJECTION, SOLUTION SUBCUTANEOUS WEEKLY
Qty: 9 ML | Refills: 1 | Status: SHIPPED | OUTPATIENT
Start: 2023-03-13

## 2023-03-24 ENCOUNTER — OFFICE VISIT (OUTPATIENT)
Facility: LOCATION | Age: 73
End: 2023-03-24
Payer: MEDICARE

## 2023-03-24 VITALS — TEMPERATURE: 98 F | HEART RATE: 78 BPM

## 2023-03-24 DIAGNOSIS — G47.33 OSA (OBSTRUCTIVE SLEEP APNEA): ICD-10-CM

## 2023-03-24 DIAGNOSIS — E11.9 TYPE 2 DIABETES MELLITUS WITHOUT COMPLICATION, WITHOUT LONG-TERM CURRENT USE OF INSULIN (HCC): ICD-10-CM

## 2023-03-24 DIAGNOSIS — K42.9 UMBILICAL HERNIA WITHOUT OBSTRUCTION AND WITHOUT GANGRENE: Primary | ICD-10-CM

## 2023-03-24 PROCEDURE — 99214 OFFICE O/P EST MOD 30 MIN: CPT | Performed by: SURGERY

## 2023-03-27 ENCOUNTER — TELEPHONE (OUTPATIENT)
Dept: ENDOCRINOLOGY CLINIC | Facility: CLINIC | Age: 73
End: 2023-03-27

## 2023-03-27 RX ORDER — ATORVASTATIN CALCIUM 20 MG/1
TABLET, FILM COATED ORAL
Qty: 90 TABLET | Refills: 0 | Status: SHIPPED | OUTPATIENT
Start: 2023-03-27

## 2023-03-27 NOTE — TELEPHONE ENCOUNTER
Assessment:      Healthy 4 y o  male child  1  Encounter for routine child health examination without abnormal findings  SYRINGE/SINGLE-DOSE VIAL: influenza vaccine, [id number], quadrivalent, 0 5 mL, preservative-free, for patients 3+ yr (FLUZONE)   2  Allergic rhinitis, unspecified seasonality, unspecified trigger     3  Iron deficiency anemia, unspecified iron deficiency anemia type  POCT hemoglobin fingerstick   4  Nutritional counseling     5  Immunization counseling     6  BMI (body mass index), pediatric, 5% to less than 85% for age            Plan:          1  Anticipatory guidance discussed  Gave handout on well-child issues at this age  2  Development: appropriate for age    1  Immunizations today: per orders  Discussed with: mother    4  Follow-up visit in 1 year for next well child visit, or sooner as needed  Subjective:       Michelle Brown is a 4 y o  male who is brought infor this well-child visit  Current Issues:  Current concerns include     Well Child Assessment:  History was provided by the mother  Julianna lives with his mother, father and sister  Nutrition  Types of intake include cereals, eggs, cow's milk, fish, juices, fruits, vegetables and meats (picky- eats only cereal)  Dental  The patient has a dental home  The patient brushes teeth regularly  The patient flosses regularly  Last dental exam was 6-12 months ago  Elimination  Toilet training is complete  Sleep  The patient sleeps in his parents' bed  Average sleep duration is 10 hours  The patient does not snore  There are no sleep problems  Safety  There is no smoking in the home  Home has working smoke alarms? yes  Home has working carbon monoxide alarms? yes  There is no gun in home  There is an appropriate car seat in use  Screening  Immunizations are up-to-date  There are no risk factors for anemia  There are no risk factors for dyslipidemia  There are no risk factors for tuberculosis   There are no risk factors for Vega requesting for A1C 6 month update for refill on Ozempic faxed them A1C result too [phone] confirmed lead toxicity  Social  The caregiver enjoys the child  Childcare is provided at child's home  The childcare provider is a parent  The following portions of the patient's history were reviewed and updated as appropriate: allergies, current medications, past family history, past medical history, past social history, past surgical history and problem list        Parents' Status Q A Comments    as of 11/9/2018 Mother's occupation Homemaker     Father's occupation Hvac       Developmental 4 Years Appropriate Q A Comments    as of 11/9/2018 Can wash and dry hands without help Yes Yes on 11/9/2018 (Age - 5yrs)    Correctly adds 's' to words to make them plural Yes Yes on 11/9/2018 (Age - 5yrs)    Can balance on 1 foot for 2 seconds or more given 3 chances Yes Yes on 11/9/2018 (Age - 5yrs)    Can copy a picture of a [place] Yes Yes on 11/9/2018 (Age - 5yrs)    Can stack 8 small (< 2") blocks without them falling Yes Yes on 11/9/2018 (Age - 5yrs)    Plays games involving taking turns and following rules (hide & seek,  & robbers, etc ) Yes Yes on 11/9/2018 (Age - 5yrs)    Can put on pants, shirt, dress, or socks without help (except help with snaps, buttons, and belts) Yes Yes on 11/9/2018 (Age - 5yrs)    Can say full name Yes Yes on 11/9/2018 (Age - 5yrs)            Objective:        Vitals:    11/09/18 1057   BP: (!) 94/56   Weight: 18 6 kg (41 lb)   Height: 3' 7" (1 092 m)     Growth parameters are noted and are appropriate for age  Wt Readings from Last 1 Encounters:   11/09/18 18 6 kg (41 lb) (80 %, Z= 0 85)*     * Growth percentiles are based on CDC 2-20 Years data  Ht Readings from Last 1 Encounters:   11/09/18 3' 7" (1 092 m) (90 %, Z= 1 29)*     * Growth percentiles are based on CDC 2-20 Years data  Body mass index is 15 59 kg/m²      Vitals:    11/09/18 1057   BP: (!) 94/56   Weight: 18 6 kg (41 lb)   Height: 3' 7" (1 092 m)        Hearing Screening    125Hz 250Hz 500Hz 1000Hz 2000Hz 3000Hz 4000Hz 6000Hz 8000Hz   Right ear: 45 96 05 49 37 06 20     Left ear: 20 20 20 20 20 20 20        Visual Acuity Screening    Right eye Left eye Both eyes   Without correction: 20/20 20/20 20/20   With correction:          Physical Exam   Constitutional: He appears well-developed  HENT:   Right Ear: Tympanic membrane normal    Left Ear: Tympanic membrane normal    Mouth/Throat: Dentition is normal  Oropharynx is clear  Eyes: Pupils are equal, round, and reactive to light  Conjunctivae are normal    Neck: Normal range of motion  Cardiovascular: Normal rate and regular rhythm  Pulmonary/Chest: Effort normal and breath sounds normal    Abdominal: Soft  There is no hepatosplenomegaly  Genitourinary: Penis normal    Musculoskeletal: Normal range of motion  Neurological: He is alert  He exhibits normal muscle tone  Skin: Skin is warm  No rash noted  Nursing note and vitals reviewed

## 2023-05-17 RX ORDER — LISINOPRIL 20 MG/1
TABLET ORAL
Qty: 90 TABLET | Refills: 0 | Status: SHIPPED | OUTPATIENT
Start: 2023-05-17

## 2023-05-19 LAB
CARTRIDGE LOT#: ABNORMAL NUMERIC
HEMOGLOBIN A1C: 7.2 % (ref 4.3–5.6)

## 2023-05-23 ENCOUNTER — PATIENT MESSAGE (OUTPATIENT)
Dept: ENDOCRINOLOGY CLINIC | Facility: CLINIC | Age: 73
End: 2023-05-23

## 2023-05-24 NOTE — TELEPHONE ENCOUNTER
From: Sonia Grady  To: Joann Krabbe, APRN  Sent: 5/23/2023 6:05 PM CDT  Subject: Request for refill for metfotmin    I am low on metformin. Please order a refill for 2000 ml. Thanks!

## 2023-06-19 RX ORDER — LISINOPRIL 20 MG/1
20 TABLET ORAL DAILY
Qty: 90 TABLET | Refills: 0 | OUTPATIENT
Start: 2023-06-19

## 2023-06-26 RX ORDER — ATORVASTATIN CALCIUM 20 MG/1
TABLET, FILM COATED ORAL
Qty: 90 TABLET | Refills: 0 | Status: SHIPPED | OUTPATIENT
Start: 2023-06-26

## 2023-07-17 ENCOUNTER — TELEPHONE (OUTPATIENT)
Dept: ENDOCRINOLOGY CLINIC | Facility: CLINIC | Age: 73
End: 2023-07-17

## 2023-07-17 ENCOUNTER — OFFICE VISIT (OUTPATIENT)
Dept: ENDOCRINOLOGY CLINIC | Facility: CLINIC | Age: 73
End: 2023-07-17
Payer: MEDICARE

## 2023-07-17 VITALS
HEART RATE: 92 BPM | WEIGHT: 182 LBS | DIASTOLIC BLOOD PRESSURE: 80 MMHG | SYSTOLIC BLOOD PRESSURE: 138 MMHG | HEIGHT: 69 IN | BODY MASS INDEX: 26.96 KG/M2

## 2023-07-17 DIAGNOSIS — I10 ESSENTIAL HYPERTENSION: ICD-10-CM

## 2023-07-17 DIAGNOSIS — E11.65 TYPE 2 DIABETES MELLITUS WITH HYPERGLYCEMIA, WITHOUT LONG-TERM CURRENT USE OF INSULIN (HCC): Primary | ICD-10-CM

## 2023-07-17 DIAGNOSIS — E78.5 DYSLIPIDEMIA: ICD-10-CM

## 2023-07-17 RX ORDER — BLOOD-GLUCOSE,RECEIVER,CONT
1 EACH MISCELLANEOUS ONCE
Qty: 1 EACH | Refills: 0 | Status: SHIPPED | OUTPATIENT
Start: 2023-07-17 | End: 2023-07-17

## 2023-07-17 RX ORDER — BLOOD-GLUCOSE SENSOR
1 EACH MISCELLANEOUS
Qty: 9 EACH | Refills: 3 | Status: SHIPPED | OUTPATIENT
Start: 2023-07-17

## 2023-07-26 ENCOUNTER — PATIENT MESSAGE (OUTPATIENT)
Dept: ENDOCRINOLOGY CLINIC | Facility: CLINIC | Age: 73
End: 2023-07-26

## 2023-07-26 NOTE — TELEPHONE ENCOUNTER
From: Dale العلي  To: Rajni Irene  Sent: 7/26/2023 11:08 AM CDT  Subject: Dexcom    Hi Behrooz,     Spoke with [facility] and your Dexcom sensor and  are ready for .     Have a great day,     Krishna Lopez

## 2023-08-03 ENCOUNTER — TELEPHONE (OUTPATIENT)
Dept: ENDOCRINOLOGY CLINIC | Facility: CLINIC | Age: 73
End: 2023-08-03

## 2023-08-07 LAB
CARTRIDGE LOT#: ABNORMAL NUMERIC
HEMOGLOBIN A1C: 8 % (ref 4.3–5.6)

## 2023-08-16 ENCOUNTER — DIABETIC EDUCATION (OUTPATIENT)
Dept: ENDOCRINOLOGY CLINIC | Facility: CLINIC | Age: 73
End: 2023-08-16
Payer: MEDICARE

## 2023-08-16 DIAGNOSIS — E11.65 TYPE 2 DIABETES MELLITUS WITH HYPERGLYCEMIA, WITHOUT LONG-TERM CURRENT USE OF INSULIN (HCC): Primary | ICD-10-CM

## 2023-08-16 PROCEDURE — 95249 CONT GLUC MNTR PT PROV EQP: CPT | Performed by: DIETITIAN, REGISTERED

## 2023-08-16 NOTE — PROGRESS NOTES
Chhaya Kline   4/24/1950 was seen for Personal Continuous Glucose Monitoring Training/Start:    Date: 8/16/2023  Referring Provider: PAWEL Bullock  Start time: 2:30 pm End time: 3:30 pm    Sensor Type: Dexcom G7    1. Differences in sensor glucose and blood glucose meter reading. 2. Always verify with a fingerstick if symptoms do not match sensor reading. 2. The sensor calibration process, if applicable  3. How to choose and rotate sensor sites/alternative sites. Injections should be given at least 3 inches away from where sensor is placed. 4. How to handle problems like MRI, CT scans, travel, etc.   5. Phone applications that may assist with using continuous glucose monitor. Patient was able to set up Dexcom G7 cameron as well as Clarity cameron on his phone. He was connected to [employer] and is streaming with diabetes clinic. His Dexcom  was set up today as well. Patient demonstrated ability to insert and start the sensor successfully. Patient placed sensor: Back of upper left arm    Sensor Settings:  High Alarm: 200     Low Alarm: 70      Current Oral Medication:  Farxiga 10 mg once/day  Metformin 1000 mg twice/day    Current Injectable Medication:  Ozempic 2.0 mg once/week      Comments:     Written materials were provided for all the content areas covered. Patient verbalized understanding and has no further questions at this time. Plan: Patient is to follow up with MD office as instructed. Has appt scheduled with PAWEL Bullock on 10/31/23.     Ricardo Mireles RDN, LDN, Wilder Gutierrez

## 2023-09-27 RX ORDER — ATORVASTATIN CALCIUM 20 MG/1
TABLET, FILM COATED ORAL
Qty: 90 TABLET | Refills: 0 | Status: SHIPPED | OUTPATIENT
Start: 2023-09-27

## 2023-09-27 NOTE — TELEPHONE ENCOUNTER
Cholesterol Medication Protocol [id number]/27/2023 12:39 AM   Protocol Details ALT < 80    ALT resulted within past year    Lipid panel within past 12 months    Appointment within past 12 or next 3 months     Requested Prescriptions     Pending Prescriptions Disp Refills    ATORVASTATIN 20 MG Oral Tab [Pharmacy Med Name: ATORVASTATIN 20 MG TABLET] 90 tablet 0     Sig: TAKE 1 TABLET BY MOUTH EVERY DAY AT NIGHT        LOV:  12--jl-physical    LAST CPE: 12--jl-physical    Last Labs:  7-1-2022-lipid,cbc,cmp,tsh     Last Refill:  6--90 tabs with 0 refills     Your appointments       Date & Time Appointment Department [facility])    Oct 31, 2023  2:30 PM CDT Diabetes Pump follow up with PAWEL Zapata [address], [place] [facility] ([address])              [address], [facility], [address] [address]  [address] [place] [address]  [phone]

## 2023-10-04 RX ORDER — LISINOPRIL 20 MG/1
TABLET ORAL
Qty: 90 TABLET | Refills: 0 | Status: SHIPPED | OUTPATIENT
Start: 2023-10-04

## 2023-10-04 NOTE — TELEPHONE ENCOUNTER
21-Jul-2017 03:45 Requested Prescriptions     Pending Prescriptions Disp Refills    LISINOPRIL 20 MG Oral Tab [Pharmacy Med Name: LISINOPRIL 20 MG TABLET] 90 tablet 0     Sig: TAKE 1 TABLET BY MOUTH EVERY DAY       LOV: 12--jl-physical    LAST CPE: 12--jl-physical    Last Labs:  3--A1c;micro    Last Refill:  5-- 90 tabs with 0 refils    Your appointments       Date & Time Appointment Department [facility])    Oct 31, 2023  2:30 PM CDT Diabetes Pump follow up with Cynthia Arora, APRN [address],[facility], [place] [facility] ([address])              [address],[facility] [address], [facility], [address] [address] [address]  [phone]

## 2023-10-06 ENCOUNTER — TELEPHONE (OUTPATIENT)
Dept: INTERNAL MEDICINE CLINIC | Facility: CLINIC | Age: 73
End: 2023-10-06

## 2023-10-06 NOTE — TELEPHONE ENCOUNTER
Future Appointments   Date Time Provider Bart Sarmiento   12/22/2023  1:00 PM Bridgette Nava MD [facility]     Orders to  ed[place]- Pt informed that labs need to be completed no sooner than 2 weeks prior to the appt.  Pt aware to fast-no call back required

## 2023-10-06 NOTE — TELEPHONE ENCOUNTER
Future Appointments   Date Time Provider Bart Teixeirai   10/31/2023  2:30 PM Darrick Kayser, APRN [facility]   12/22/2023  1:00 PM Dedrick Mendoza MD [facility]

## 2023-10-31 ENCOUNTER — OFFICE VISIT (OUTPATIENT)
Dept: ENDOCRINOLOGY CLINIC | Facility: CLINIC | Age: 73
End: 2023-10-31

## 2023-10-31 ENCOUNTER — TELEPHONE (OUTPATIENT)
Dept: ENDOCRINOLOGY CLINIC | Facility: CLINIC | Age: 73
End: 2023-10-31

## 2023-10-31 VITALS
WEIGHT: 182.19 LBS | HEIGHT: 68.9 IN | DIASTOLIC BLOOD PRESSURE: 76 MMHG | TEMPERATURE: 99 F | SYSTOLIC BLOOD PRESSURE: 128 MMHG | OXYGEN SATURATION: 96 % | HEART RATE: 92 BPM | BODY MASS INDEX: 26.98 KG/M2 | RESPIRATION RATE: 16 BRPM

## 2023-10-31 DIAGNOSIS — Z23 FLU VACCINE NEED: ICD-10-CM

## 2023-10-31 DIAGNOSIS — E78.49 OTHER HYPERLIPIDEMIA: ICD-10-CM

## 2023-10-31 DIAGNOSIS — Z12.5 ENCOUNTER FOR SPECIAL SCREENING EXAMINATION FOR NEOPLASM OF PROSTATE: Primary | ICD-10-CM

## 2023-10-31 DIAGNOSIS — E11.65 TYPE 2 DIABETES MELLITUS WITH HYPERGLYCEMIA, WITHOUT LONG-TERM CURRENT USE OF INSULIN (HCC): ICD-10-CM

## 2023-10-31 DIAGNOSIS — I10 ESSENTIAL HYPERTENSION: ICD-10-CM

## 2023-10-31 LAB
CARTRIDGE LOT#: 642 NUMERIC
HEMOGLOBIN A1C: 7.1 % (ref 4.3–5.6)

## 2023-10-31 PROCEDURE — 90662 IIV NO PRSV INCREASED AG IM: CPT | Performed by: NURSE PRACTITIONER

## 2023-10-31 PROCEDURE — G0008 ADMIN INFLUENZA VIRUS VAC: HCPCS | Performed by: NURSE PRACTITIONER

## 2023-10-31 PROCEDURE — 95251 CONT GLUC MNTR ANALYSIS I&R: CPT | Performed by: NURSE PRACTITIONER

## 2023-10-31 PROCEDURE — 1126F AMNT PAIN NOTED NONE PRSNT: CPT | Performed by: NURSE PRACTITIONER

## 2023-10-31 PROCEDURE — 99214 OFFICE O/P EST MOD 30 MIN: CPT | Performed by: NURSE PRACTITIONER

## 2023-10-31 PROCEDURE — 83036 HEMOGLOBIN GLYCOSYLATED A1C: CPT | Performed by: NURSE PRACTITIONER

## 2023-10-31 RX ORDER — SEMAGLUTIDE 2.68 MG/ML
2 INJECTION, SOLUTION SUBCUTANEOUS WEEKLY
Qty: 9 ML | Refills: 1 | Status: SHIPPED | OUTPATIENT
Start: 2023-10-31

## 2023-10-31 NOTE — TELEPHONE ENCOUNTER
Received fax for diabetic eye exam patient has no retinopathy in either eyes abstracted and attached bar code taking to [address] for CB to review

## 2023-11-09 ENCOUNTER — PATIENT MESSAGE (OUTPATIENT)
Dept: ENDOCRINOLOGY CLINIC | Facility: CLINIC | Age: 73
End: 2023-11-09

## 2023-11-09 DIAGNOSIS — E11.65 TYPE 2 DIABETES MELLITUS WITH HYPERGLYCEMIA, WITHOUT LONG-TERM CURRENT USE OF INSULIN (HCC): ICD-10-CM

## 2023-11-09 RX ORDER — SEMAGLUTIDE 2.68 MG/ML
2 INJECTION, SOLUTION SUBCUTANEOUS WEEKLY
Qty: 9 ML | Refills: 1 | Status: SHIPPED | OUTPATIENT
Start: 2023-11-09

## 2023-12-14 ENCOUNTER — LAB ENCOUNTER (OUTPATIENT)
Dept: LAB | Facility: HOSPITAL | Age: 73
End: 2023-12-14
Attending: NURSE PRACTITIONER
Payer: MEDICARE

## 2023-12-14 DIAGNOSIS — E11.65 TYPE 2 DIABETES MELLITUS WITH HYPERGLYCEMIA, WITHOUT LONG-TERM CURRENT USE OF INSULIN (HCC): ICD-10-CM

## 2023-12-14 DIAGNOSIS — Z12.5 ENCOUNTER FOR SPECIAL SCREENING EXAMINATION FOR NEOPLASM OF PROSTATE: ICD-10-CM

## 2023-12-14 LAB
ALBUMIN SERPL-MCNC: 3.8 G/DL (ref 3.4–5)
ALBUMIN/GLOB SERPL: 1.1 {RATIO} (ref 1–2)
ALP LIVER SERPL-CCNC: 26 U/L
ALT SERPL-CCNC: 19 U/L
ANION GAP SERPL CALC-SCNC: 4 MMOL/L (ref 0–18)
AST SERPL-CCNC: 11 U/L (ref 15–37)
BILIRUB SERPL-MCNC: 0.4 MG/DL (ref 0.1–2)
BUN BLD-MCNC: 13 MG/DL (ref 9–23)
CALCIUM BLD-MCNC: 8.8 MG/DL (ref 8.5–10.1)
CHLORIDE SERPL-SCNC: 107 MMOL/L (ref 98–112)
CHOLEST SERPL-MCNC: 147 MG/DL (ref ?–200)
CO2 SERPL-SCNC: 29 MMOL/L (ref 21–32)
COMPLEXED PSA SERPL-MCNC: 2.24 NG/ML (ref ?–4)
CREAT BLD-MCNC: 0.88 MG/DL
EGFRCR SERPLBLD CKD-EPI 2021: 91 ML/MIN/1.73M2 (ref 60–?)
FASTING PATIENT LIPID ANSWER: YES
FASTING STATUS PATIENT QL REPORTED: YES
GLOBULIN PLAS-MCNC: 3.5 G/DL (ref 2.8–4.4)
GLUCOSE BLD-MCNC: 152 MG/DL (ref 70–99)
HDLC SERPL-MCNC: 44 MG/DL (ref 40–59)
LDLC SERPL CALC-MCNC: 82 MG/DL (ref ?–100)
NONHDLC SERPL-MCNC: 103 MG/DL (ref ?–130)
OSMOLALITY SERPL CALC.SUM OF ELEC: 293 MOSM/KG (ref 275–295)
POTASSIUM SERPL-SCNC: 4 MMOL/L (ref 3.5–5.1)
PROT SERPL-MCNC: 7.3 G/DL (ref 6.4–8.2)
SODIUM SERPL-SCNC: 140 MMOL/L (ref 136–145)
TRIGL SERPL-MCNC: 119 MG/DL (ref 30–149)
TSI SER-ACNC: 1.21 MIU/ML (ref 0.36–3.74)
VLDLC SERPL CALC-MCNC: 19 MG/DL (ref 0–30)

## 2023-12-14 PROCEDURE — 80061 LIPID PANEL: CPT

## 2023-12-14 PROCEDURE — 84443 ASSAY THYROID STIM HORMONE: CPT

## 2023-12-14 PROCEDURE — 80053 COMPREHEN METABOLIC PANEL: CPT

## 2023-12-14 PROCEDURE — 36415 COLL VENOUS BLD VENIPUNCTURE: CPT

## 2023-12-22 ENCOUNTER — OFFICE VISIT (OUTPATIENT)
Dept: INTERNAL MEDICINE CLINIC | Facility: CLINIC | Age: 73
End: 2023-12-22
Payer: MEDICARE

## 2023-12-22 VITALS
TEMPERATURE: 97 F | SYSTOLIC BLOOD PRESSURE: 124 MMHG | HEART RATE: 101 BPM | RESPIRATION RATE: 18 BRPM | DIASTOLIC BLOOD PRESSURE: 72 MMHG | OXYGEN SATURATION: 95 % | WEIGHT: 179.38 LBS | HEIGHT: 69 IN | BODY MASS INDEX: 26.57 KG/M2

## 2023-12-22 DIAGNOSIS — E78.49 OTHER HYPERLIPIDEMIA: ICD-10-CM

## 2023-12-22 DIAGNOSIS — Z00.00 ROUTINE GENERAL MEDICAL EXAMINATION AT A HEALTH CARE FACILITY: Primary | ICD-10-CM

## 2023-12-22 DIAGNOSIS — R35.1 NOCTURIA: ICD-10-CM

## 2023-12-22 DIAGNOSIS — G47.33 OSA (OBSTRUCTIVE SLEEP APNEA): ICD-10-CM

## 2023-12-22 DIAGNOSIS — E11.9 TYPE 2 DIABETES MELLITUS WITHOUT COMPLICATION, WITHOUT LONG-TERM CURRENT USE OF INSULIN (HCC): ICD-10-CM

## 2023-12-22 DIAGNOSIS — I10 ESSENTIAL HYPERTENSION: ICD-10-CM

## 2023-12-22 DIAGNOSIS — F03.A0 MILD DEMENTIA WITHOUT BEHAVIORAL DISTURBANCE, PSYCHOTIC DISTURBANCE, MOOD DISTURBANCE, OR ANXIETY, UNSPECIFIED DEMENTIA TYPE (HCC): ICD-10-CM

## 2023-12-22 PROCEDURE — G0439 PPPS, SUBSEQ VISIT: HCPCS | Performed by: INTERNAL MEDICINE

## 2023-12-22 PROCEDURE — 99214 OFFICE O/P EST MOD 30 MIN: CPT | Performed by: INTERNAL MEDICINE

## 2023-12-22 PROCEDURE — 1125F AMNT PAIN NOTED PAIN PRSNT: CPT | Performed by: INTERNAL MEDICINE

## 2023-12-22 RX ORDER — ACYCLOVIR 400 MG/1
TABLET ORAL
COMMUNITY
Start: 2023-07-24

## 2023-12-24 DIAGNOSIS — E78.49 OTHER HYPERLIPIDEMIA: Primary | ICD-10-CM

## 2023-12-24 DIAGNOSIS — I10 ESSENTIAL HYPERTENSION: Primary | ICD-10-CM

## 2023-12-25 RX ORDER — LISINOPRIL 20 MG/1
20 TABLET ORAL DAILY
Qty: 90 TABLET | Refills: 0 | Status: SHIPPED | OUTPATIENT
Start: 2023-12-25

## 2023-12-25 RX ORDER — ATORVASTATIN CALCIUM 20 MG/1
TABLET, FILM COATED ORAL
Qty: 90 TABLET | Refills: 1 | Status: SHIPPED | OUTPATIENT
Start: 2023-12-25

## 2024-02-08 NOTE — TELEPHONE ENCOUNTER
Requested Prescriptions     Pending Prescriptions Disp Refills    FARXIGA 10 MG Oral Tab [Pharmacy Med Name: FARXIGA 10 MG TABLET] 90 tablet 1     Sig: TAKE 1 TABLET (10 MG TOTAL) BY MOUTH EVERY MORNING.     Your appointments       Date & Time Appointment Department ([place])    May 13, 2024  1:30 PM CDT Diabetes Pump follow up with Yenifer Carrillo APRN [address] ([facility] DI[facility] [place])        Jun 28, 2024  1:40 PM CDT Follow up - Extended with Armando Antunez MD [address] ([facility] IM[facility] [place])              [address]  [facility] [place]  [address] [address] [phone] [address]  [facility] [place]  [address]  [facility] [id number] [phone]          Last A1c value was 7.1% done 10/31/2023.    Refill 7/17/23  LOV 10/31/23

## 2024-03-22 DIAGNOSIS — I10 ESSENTIAL HYPERTENSION: ICD-10-CM

## 2024-03-26 RX ORDER — LISINOPRIL 20 MG/1
20 TABLET ORAL DAILY
Qty: 90 TABLET | Refills: 0 | Status: SHIPPED | OUTPATIENT
Start: 2024-03-26

## 2024-03-26 NOTE — TELEPHONE ENCOUNTER
Last VISIT 12-22-23 JL physical     Last CPE 12-22-23     Last REFILL 12-25-23     Last LABS 12-14-23 cmp, lipid     Future Appointments   Date Time Provider Department Center   5/3/2024  9:30 AM Amalia Olguin,  [id number] [facility] [place]   5/13/2024  1:30 PM Yenifer Carrillo APRN [facility]   6/28/2024  1:40 PM Armando Antunez MD [facility]         Per PROTOCOL? Yes

## 2024-04-01 NOTE — TELEPHONE ENCOUNTER
Requested Prescriptions     Pending Prescriptions Disp Refills    metFORMIN HCl 1000 MG Oral Tab 180 tablet 3     Sig: Take 1 tablet (1,000 mg total) by mouth 2 (two) times daily with meals.     Future Appointments   Date Time Provider Department Center   5/3/2024  9:30 AM Amalia Olguin DO [id number] [place]   5/13/2024  1:30 PM Yenifer Carrillo APRN [id number]   6/28/2024  1:40 PM Armando Antunez MD [facility]     Last A1c value was 7.1% done 10/31/2023.  Refill 5/24/23  LOV 12/22/23

## 2024-05-03 ENCOUNTER — OFFICE VISIT (OUTPATIENT)
Facility: CLINIC | Age: 74
End: 2024-05-03
Payer: MEDICARE

## 2024-05-03 VITALS
BODY MASS INDEX: 26.51 KG/M2 | SYSTOLIC BLOOD PRESSURE: 120 MMHG | OXYGEN SATURATION: 99 % | HEIGHT: 69 IN | RESPIRATION RATE: 18 BRPM | DIASTOLIC BLOOD PRESSURE: 68 MMHG | WEIGHT: 179 LBS | HEART RATE: 92 BPM

## 2024-05-03 DIAGNOSIS — E11.9 TYPE 2 DIABETES MELLITUS WITHOUT COMPLICATION, WITHOUT LONG-TERM CURRENT USE OF INSULIN (HCC): Primary | ICD-10-CM

## 2024-05-03 DIAGNOSIS — E11.65 TYPE 2 DIABETES MELLITUS WITH HYPERGLYCEMIA, WITHOUT LONG-TERM CURRENT USE OF INSULIN (HCC): ICD-10-CM

## 2024-05-03 LAB — HEMOGLOBIN A1C: 7.3 % (ref 4.3–5.6)

## 2024-05-03 PROCEDURE — 83036 HEMOGLOBIN GLYCOSYLATED A1C: CPT | Performed by: STUDENT IN AN ORGANIZED HEALTH CARE EDUCATION/TRAINING PROGRAM

## 2024-05-03 PROCEDURE — 95249 CONT GLUC MNTR PT PROV EQP: CPT | Performed by: STUDENT IN AN ORGANIZED HEALTH CARE EDUCATION/TRAINING PROGRAM

## 2024-05-03 PROCEDURE — 99205 OFFICE O/P NEW HI 60 MIN: CPT | Performed by: STUDENT IN AN ORGANIZED HEALTH CARE EDUCATION/TRAINING PROGRAM

## 2024-05-03 RX ORDER — SEMAGLUTIDE 2.68 MG/ML
2 INJECTION, SOLUTION SUBCUTANEOUS WEEKLY
Qty: 9 ML | Refills: 1 | Status: SHIPPED | OUTPATIENT
Start: 2024-05-03

## 2024-05-03 NOTE — PATIENT INSTRUCTIONS
Please try to reduce your carb intake with lunch and dinner and try to do a little activity after each meal, in addition to formal exercise.   If your Dexcom gives you readings that don't make sense, confirm with the fingerstick test.   I sent your Ozempic to 2mg/week.     Return Visit   [X] Physician in 3 months

## 2024-05-08 NOTE — H&P
[facility] Endocrinology, Diabetes, and Metabolism Clinic Visit  5/3/2024    CONSULT - Reason for Visit:  Diabetes management. Patient was referred by: Armando Antunez MD      CHIEF COMPLAINT:    Chief Complaint   Patient presents with    New Patient     Pt here for second opinion on diabetes, per pt diabetes not controled, pt is on Dexcom, pt last A1C was done on 10/31/2023-7.1   A1c done today with 7.3  Eye exam 2023 Dr Mcnair was put in flowsheet, will in July 5 2024  Foot was done 10/31/2023            HISTORY OF PRESENT ILLNESS:  Behrooz Fallahi is a 74 year old male with past medical history of type 2 diabetes, hypertension, hyperlipidemia, BPH, glaucoma, obstructive sleep apnea who presents for evaluation of:    Diabetes mellitus - Type 2  -Current medications -Ozempic 2 mg weekly, metformin 1000 mg twice daily, Farxiga 10 mg daily  -Previous medications -sulfonylureas, discontinued due tohypoglycemia  -History of UTI or yeast infection - Denies  -History of pancreatitis - Denies  -History of thyroid CA - Denies  -Blood sugar log - Dexcom as below  -Hypoglycemia - Denies  -Hypoglycemia awareness - N/A  -Hypoglycemia treatment -N/A  -Diet -does not indulge in sugary snacks or drinks, however does tend to have a large amount of carbs such as rice with dinner  -Exercise -was not going to the gym or exercising frequently but has recently renewed to membership and will plan to increase activity  -Previous diabetes education -yes, was following in diabetes center   -Microvascular complications      -Retinopathy: No      -Nephropathy: No      -Neuropathy: No      -Gastroparesis: No  -Macrovascular complications      -CAD: No      -CVA: No      -PAD: No  -Comorbidities      -HTN: Yes      -HLD: Yes    CURRENT MEDICATIONS:      Current Outpatient Medications:     semaglutide (OZEMPIC, 2 MG/DOSE,) 8 MG/3ML Subcutaneous Solution Pen-injector, Inject 2 mg into the skin once a week., Disp: 9 mL, Rfl: 1    metFORMIN HCl 1000 MG  Oral Tab, Take 1 tablet (1,000 mg total) by mouth 2 (two) times daily with meals., Disp: 180 tablet, Rfl: 1    LISINOPRIL 20 MG Oral Tab, TAKE 1 TABLET BY MOUTH EVERY DAY, Disp: 90 tablet, Rfl: 0    dapagliflozin (FARXIGA) 10 MG Oral Tab, Take 1 tablet (10 mg total) by mouth every morning., Disp: 90 tablet, Rfl: 1    atorvastatin 20 MG Oral Tab, TAKE 1 TABLET BY MOUTH EVERY DAY AT NIGHT, Disp: 90 tablet, Rfl: 1    Continuous Blood Gluc  (DEXCOM G7 ) Does not apply Device, USE 1 DEVICE AS DIRECTED, Disp: , Rfl:     Continuous Blood Gluc Sensor (DEXCOM G7 SENSOR) Does not apply Misc, 1 each Every 10 days., Disp: 9 each, Rfl: 3    lactulose 10 GM/15ML Oral Solution, Take 15 mL (10 g total) by mouth 2 (two) times daily., Disp: , Rfl:     cyanocobalamin 1000 MCG/ML Injection Solution, , Disp: , Rfl:     MEMANTINE HCL 10 MG Oral Tab, TAKE 1 TABLET BY MOUTH TWICE A DAY, Disp: 60 tablet, Rfl: 0    Calcium Carbonate Antacid 600 MG Oral Chew Tab, Chew 600 mg by mouth as needed.  , Disp: , Rfl:     Coenzyme Q10 (COQ10) 400 MG Oral Cap, Take by mouth., Disp: , Rfl:     Uppvmsfie-Jzlcjgosm-Uhrtqbnqvt (CEREFOLIN NAC) 6-2-600 MG Oral Tab, Take 1 tablet by mouth daily., Disp: , Rfl:     Omega-3 Fatty Acids (FISH OIL) 600 MG Oral Cap, Take  by mouth., Disp: , Rfl:       Allergies, PMH, SocHx and FHx reviewed and updated as appropriate in Epic on    semaglutide (OZEMPIC, 2 MG/DOSE,) 8 MG/3ML Subcutaneous Solution Pen-injector Inject 2 mg into the skin once a week. 9 mL 1    metFORMIN HCl 1000 MG Oral Tab Take 1 tablet (1,000 mg total) by mouth 2 (two) times daily with meals. 180 tablet 1    LISINOPRIL 20 MG Oral Tab TAKE 1 TABLET BY MOUTH EVERY DAY 90 tablet 0    dapagliflozin (FARXIGA) 10 MG Oral Tab Take 1 tablet (10 mg total) by mouth every morning. 90 tablet 1    atorvastatin 20 MG Oral Tab TAKE 1 TABLET BY MOUTH EVERY DAY AT NIGHT 90 tablet 1    Continuous Blood Gluc  (DEXCOM G7 ) Does not apply  Device USE 1 DEVICE AS DIRECTED      Continuous Blood Gluc Sensor (DEXCOM G7 SENSOR) Does not apply Misc 1 each Every 10 days. 9 each 3    lactulose 10 GM/15ML Oral Solution Take 15 mL (10 g total) by mouth 2 (two) times daily.      cyanocobalamin 1000 MCG/ML Injection Solution       MEMANTINE HCL 10 MG Oral Tab TAKE 1 TABLET BY MOUTH TWICE A DAY 60 tablet 0    Calcium Carbonate Antacid 600 MG Oral Chew Tab Chew 600 mg by mouth as needed.        Coenzyme Q10 (COQ10) 400 MG Oral Cap Take by mouth.      Sezygbyln-Xdzbjyhhy-Rqyntuziyc (CEREFOLIN NAC) 6-2-600 MG Oral Tab Take 1 tablet by mouth daily.      Omega-3 Fatty Acids (FISH OIL) 600 MG Oral Cap Take  by mouth.       Allergies   Allergen Reactions    Other RASH and ITCHING     UV rays/Sunlight     Current Outpatient Medications   Medication Sig Dispense Refill    semaglutide (OZEMPIC, 2 MG/DOSE,) 8 MG/3ML Subcutaneous Solution Pen-injector Inject 2 mg into the skin once a week. 9 mL 1    metFORMIN HCl 1000 MG Oral Tab Take 1 tablet (1,000 mg total) by mouth 2 (two) times daily with meals. 180 tablet 1    LISINOPRIL 20 MG Oral Tab TAKE 1 TABLET BY MOUTH EVERY DAY 90 tablet 0    dapagliflozin (FARXIGA) 10 MG Oral Tab Take 1 tablet (10 mg total) by mouth every morning. 90 tablet 1    atorvastatin 20 MG Oral Tab TAKE 1 TABLET BY MOUTH EVERY DAY AT NIGHT 90 tablet 1    Continuous Blood Gluc  (DEXCOM G7 ) Does not apply Device USE 1 DEVICE AS DIRECTED      Continuous Blood Gluc Sensor (DEXCOM G7 SENSOR) Does not apply Misc 1 each Every 10 days. 9 each 3    lactulose 10 GM/15ML Oral Solution Take 15 mL (10 g total) by mouth 2 (two) times daily.      cyanocobalamin 1000 MCG/ML Injection Solution       MEMANTINE HCL 10 MG Oral Tab TAKE 1 TABLET BY MOUTH TWICE A DAY 60 tablet 0    Calcium Carbonate Antacid 600 MG Oral Chew Tab Chew 600 mg by mouth as needed.        Coenzyme Q10 (COQ10) 400 MG Oral Cap Take by mouth.      Fkkpulyjf-Cvwdxgcbj-Hqhdcdiykf  (CEREFOLIN NAC) 6-2-600 MG Oral Tab Take 1 tablet by mouth daily.      Omega-3 Fatty Acids (FISH OIL) 600 MG Oral Cap Take  by mouth.       Past Medical History:    Essential hypertension    Hematuria    Hyperlipidemia    Kidney stones    Obstructive sleep apnea (adult) (pediatric)    dx 2015 at [facility]  - AHI 6 autoPAP 6-16 Sleep RX     Type II or unspecified type diabetes mellitus without mention of complication, not stated as uncontrolled     Past Surgical History:   Procedure Laterality Date    Hernia surgery Bilateral 01/04/2018    inguinal hernia 1/4/2018 with Dr. Keating    Other surgical history  1/24/2014    Cystoscopy - Dr. Pathak      Social History     Socioeconomic History    Marital status:    Tobacco Use    Smoking status: Never    Smokeless tobacco: Never   Vaping Use    Vaping status: Never Used   Substance and Sexual Activity    Alcohol use: No     Alcohol/week: 0.0 standard drinks of alcohol    Drug use: No    Sexual activity: Yes   Other Topics Concern    Caffeine Concern Yes     Comment: 3-4 cups coffee daily    Exercise Yes   Social History Narrative    The patient does not use an assistive device..      The patient DOES  live in a home with stairs.     Family History   Problem Relation Age of Onset    Cancer Father         prostate     Diabetes Mother     Diabetes Sister     Diabetes Sister        REVIEW OF SYSTEMS:  Ten point review of systems has been performed and is otherwise negative and/or non-contributory, except as described above.        PHYSICAL EXAM:    Vitals:    05/03/24 0940   BP: 120/68   Pulse: 92   Resp: 18        Body mass index is 26.43 kg/m².    CONSTITUTIONAL:  awake, alert, cooperative, no apparent distress  PSYCH: normal affect  EYES:  No proptosis, no ptosis, conjunctiva normal  ENT:  Normocephalic, atraumatic  NECK: no adenopathy, thyroid symmetric, not enlarged and no tenderness,  LUNGS: clear to auscultation bilaterally, no crackles or wheezing  CARDIOVASCULAR:   regular rate and rhythm  ABDOMEN:  normal bowel sounds, soft, non-distended, non-tender  SKIN:  no bruising or bleeding, no rashes and no lesions  DIABETIC FOOT EXAM: Performed Oct 2023  Cushing features - No  Acromegaly features - No      DATA:    Lab Results   Component Value Date    A1C 7.3 (A) 05/03/2024     Lipid Profile:  Lab Results   Component Value Date    TRIG 119 12/14/2023    HDL 44 12/14/2023    LDL 82 12/14/2023     CMP Labs:  Lab Results   Component Value Date     (H) 12/14/2023     12/14/2023    K 4.0 12/14/2023     12/14/2023    CO2 29.0 12/14/2023    BUN 13 12/14/2023    CA 8.8 12/14/2023    ALB 3.8 12/14/2023    AST 11 (L) 12/14/2023    ALT 19 12/14/2023     3/13/23  2:24 PM    Microalbumin, Urine  mg/dL 0.65   Creatinine Ur Random  mg/dL 27.70   Malb/Cre Calc  <=30.0 ug/mg 23.5     Continuous Glucose Monitoring Interpretation    Behrooz Fallahi has undergone continuous glucose monitoring with the Dexcom G7 CGM with phone (connected to clinic profile).  The blood glucose tracings were evaluated for two weeks prior to office visit.    Blood glucose tracings demonstrated areas of  hyperglycemia areas of hyperglycemia post-meal, particularly post-dinner  There were no areas of hypoglycemia noted during the weeks of evaluation.      At goal () 56% of the time.  High 42% of the time.  Very high 1% of the time.  Low 0% of the time.  Very low <1% of the time.      ASSESSMENT AND PLAN:    Behrooz Fallahi is a 74 year old male who was referred by Dr. Armando Antunez MD for evaluation of:    #Type 2 diabetes  -Patient with longstanding diabetes, previously managed in diabetes center  -Current diabetes regimen: Ozempic 2 mg weekly, metformin 1000 mg twice daily, Farxiga 10 mg daily  -Previous medications: Sulfonylureas (discontinued due to hypoglycemia)  -Reviewed multifactorial nature of successful glycemic control involving pharmacotherapy, diet and carbohydrate management, and  optimization of physical activity. Reviewed goals of therapy with DM and rationale of A1C/glycemic goals in prevention of development/progression of organ damage and diabetes complications.  -Reviewed previous clinical course for diabetes, reassured patient that his diabetes is well-managed and his A1c is at goal for age though we can always try to optimize  -Reviewed that avoidance of hypoglycemia is key in his age group  -Extensively reviewed diet and lifestyle recommendations, rather than adding additional pharmacotherapy at this time recommend the patient increase his physical activity particularly following meals and recommend that he reduce his carbohydrate intake with dinnertime as this appears to be where most of his hypoglycemia is concentrated   -Recommend trial of 3 months of lifestyle modification  -Patient's sugars are still above his goal at that time, can consider pharmacotherapy modification at that time    -Surveillance for Complications & Risks  - HgA1c (goal <7.5%): 7.3% May 2024  - Renal: No previous nephropathy.  Currently on Farxiga 10 mg daily.  Currently on lisinopril 20 mg daily.  - Blood Pressure (goal <130/80): Yes  - Optho: Eye screening (yearly): Last exam 1/30/24 with no diabetic retinopathy.   - Neuro: Encouraged proper footwear and regular diabetic foot care  - HLD: LDL at goal Dec 2023. Pt on atorvastatin 20mg daily.  - Taking ASA: No      The above plan was discussed in detail with the patient who verbalized understanding and agreement.      A total of 60 minutes was spent today on obtaining history, reviewing pertinent labs, reviewing relevant pathophysiology with patient, evaluating patient, providing multiple treatment options, and completing documentation and orders.     Amalia Olguin DO  [facility] Endocrinology  5/3/2024    Note to patient: The 21 Century Cures Act makes medical notes like these available to patients in the interest of transparency. However, be advised this is a  medical document. It is intended as peer to peer communication. It is written in medical language and may contain abbreviations or verbiage that are unfamiliar. It may appear blunt or direct. Medical documents are intended to carry relevant information, facts as evident, and the clinical opinion of the practitioner.

## 2024-06-19 DIAGNOSIS — E78.49 OTHER HYPERLIPIDEMIA: ICD-10-CM

## 2024-06-21 RX ORDER — ATORVASTATIN CALCIUM 20 MG/1
20 TABLET, FILM COATED ORAL NIGHTLY
Qty: 90 TABLET | Refills: 3 | Status: SHIPPED | OUTPATIENT
Start: 2024-06-21

## 2024-06-21 NOTE — TELEPHONE ENCOUNTER
Refill passed per [facility] protocol.  Requested Prescriptions   Pending Prescriptions Disp Refills    ATORVASTATIN 20 MG Oral Tab [Pharmacy Med Name: ATORVASTATIN 20 MG TABLET] 90 tablet 1     Sig: TAKE 1 TABLET BY MOUTH EVERY DAY AT NIGHT       Cholesterol Medication Protocol Passed - 6/19/2024 12:09 AM        Passed - ALT < 80     Lab Results   Component Value Date    ALT 19 12/14/2023             Passed - ALT resulted within past year        Passed - Lipid panel within past 12 months     Lab Results   Component Value Date    CHOLEST 147 12/14/2023    TRIG 119 12/14/2023    HDL 44 12/14/2023    LDL 82 12/14/2023    VLDL 19 12/14/2023    TCHDLRATIO 3.60 07/21/2018    NONHDLC 103 12/14/2023             Passed - In person appointment or virtual visit in the past 12 mos or appointment in next 3 mos     Recent Outpatient Visits              1 month ago Type 2 diabetes mellitus without complication, without long-term current use of insulin ([facility])    [facility] Amalia Olguin DO    Office Visit    6 months ago Routine general medical examination at a health care facility    [address] Armando Antunez MD    Office Visit    7 months ago Encounter for special screening examination for neoplasm of prostate    [address] Yenifer Carrillo APRN    Office Visit    11 months ago Type 2 diabetes mellitus with hyperglycemia, without long-term current use of insulin ([facility])    [address] Yenifer Carrillo APRN    Office Visit    1 year ago Umbilical hernia without obstruction and without gangrene    [facility] Mehul Keating MD    Office Visit          Future Appointments         Provider Department Appt Notes    In 1 week Armando Antunez MD [address] 6 mo fup     In 1 month Amalia Olguin DO [facility] FU 3 months / pt wants 2nd opinion, per pt thinks sugars are not undercontrol / Diabetic management.  LOV 5/3/24                       Recent Outpatient Visits              1 month ago Type 2 diabetes mellitus without complication, without long-term current use of insulin ([facility])    [facility] Amalia Olguin DO    Office Visit    6 months ago Routine general medical examination at a health care facility    [address] Armando Antunez MD    Office Visit    7 months ago Encounter for special screening examination for neoplasm of prostate    [address] Yenifer Carrillo APRN    Office Visit    11 months ago Type 2 diabetes mellitus with hyperglycemia, without long-term current use of insulin ([facility])    [address] Yenifer Carrillo APRN    Office Visit    1 year ago Umbilical hernia without obstruction and without gangrene    [facility] Mehul Keating MD    Office Visit          Future Appointments         Provider Department Appt Notes    In 1 week Armando Antunez MD [address] 6 mo fup    In 1 month Amalia Olgiun DO [facility] FU 3 months / pt wants 2nd opinion, per pt thinks sugars are not undercontrol / Diabetic management.  LOV 5/3/24

## 2024-06-23 DIAGNOSIS — I10 ESSENTIAL HYPERTENSION: ICD-10-CM

## 2024-06-26 RX ORDER — LISINOPRIL 20 MG/1
20 TABLET ORAL DAILY
Qty: 90 TABLET | Refills: 3 | Status: SHIPPED | OUTPATIENT
Start: 2024-06-26

## 2024-06-26 NOTE — TELEPHONE ENCOUNTER
REFILL PASSED PER [facility] PROTOCOLS    Requested Prescriptions   Pending Prescriptions Disp Refills    LISINOPRIL 20 MG Oral Tab [Pharmacy Med Name: LISINOPRIL 20 MG TABLET] 90 tablet 0     Sig: TAKE 1 TABLET BY MOUTH EVERY DAY       Hypertension Medications Protocol Passed - 6/23/2024  7:01 AM        Passed - CMP or BMP in past 12 months        Passed - Last BP reading less than 140/90     BP Readings from Last 1 Encounters:   05/03/24 120/68               Passed - In person appointment or virtual visit in the past 12 mos or appointment in next 3 mos     Recent Outpatient Visits              1 month ago Type 2 diabetes mellitus without complication, without long-term current use of insulin ([facility])    [facility] Amalia Olguin DO    Office Visit    6 months ago Routine general medical examination at a health care facility    [address] Armando Antunez MD    Office Visit    7 months ago Encounter for special screening examination for neoplasm of prostate    [address] Yenifer Carrillo APRN    Office Visit    11 months ago Type 2 diabetes mellitus with hyperglycemia, without long-term current use of insulin ([facility])    [address] Yenifer Carrillo APRN    Office Visit    1 year ago Umbilical hernia without obstruction and without gangrene    [facility] Mehul Keating MD    Office Visit          Future Appointments         Provider Department Appt Notes    In 6 days Armando Antunez MD [address] 6 mo fup    In 1 month Amalia Olguin DO [facility] FU 3 months / pt wants 2nd opinion, per pt thinks sugars are not undercontrol / Diabetic management.  LOV 5/3/24                    Passed - EGFRCR or GFRNAA >  50     GFR Evaluation  EGFRCR: 91 , resulted on 12/14/2023               Future Appointments         Provider Department Appt Notes    In 6 days Armando Antunez MD [address] 6 mo fup    In 1 month Amalia Olguin DO [facility] 3 months / pt wants 2nd opinion, per pt thinks sugars are not undercontrol / Diabetic management.  LOV 5/3/24          Recent Outpatient Visits              1 month ago Type 2 diabetes mellitus without complication, without long-term current use of insulin ([facility])    [facility] Amalia Olguin DO    Office Visit    6 months ago Routine general medical examination at a health care facility    [address] Armando Antunez MD    Office Visit    7 months ago Encounter for special screening examination for neoplasm of prostate    [address] Yenifer Carrillo APRN    Office Visit    11 months ago Type 2 diabetes mellitus with hyperglycemia, without long-term current use of insulin (HCC)    [address] Yenifer Carrillo APRN    Office Visit    1 year ago Umbilical hernia without obstruction and without gangrene    [facility] Mehul Keating MD    Office Visit

## 2024-07-02 ENCOUNTER — OFFICE VISIT (OUTPATIENT)
Dept: INTERNAL MEDICINE CLINIC | Facility: CLINIC | Age: 74
End: 2024-07-02
Payer: MEDICARE

## 2024-07-02 VITALS
HEART RATE: 101 BPM | OXYGEN SATURATION: 97 % | WEIGHT: 180 LBS | SYSTOLIC BLOOD PRESSURE: 138 MMHG | RESPIRATION RATE: 18 BRPM | TEMPERATURE: 98 F | DIASTOLIC BLOOD PRESSURE: 76 MMHG | BODY MASS INDEX: 27 KG/M2

## 2024-07-02 DIAGNOSIS — Z12.11 SPECIAL SCREENING FOR MALIGNANT NEOPLASMS, COLON: ICD-10-CM

## 2024-07-02 DIAGNOSIS — E11.9 TYPE 2 DIABETES MELLITUS WITHOUT COMPLICATION, WITHOUT LONG-TERM CURRENT USE OF INSULIN (HCC): Primary | ICD-10-CM

## 2024-07-02 DIAGNOSIS — I10 ESSENTIAL HYPERTENSION: ICD-10-CM

## 2024-07-02 DIAGNOSIS — E78.49 OTHER HYPERLIPIDEMIA: ICD-10-CM

## 2024-07-02 DIAGNOSIS — M54.50 CHRONIC BILATERAL LOW BACK PAIN WITHOUT SCIATICA: ICD-10-CM

## 2024-07-02 DIAGNOSIS — G89.29 CHRONIC BILATERAL LOW BACK PAIN WITHOUT SCIATICA: ICD-10-CM

## 2024-07-02 PROCEDURE — 99214 OFFICE O/P EST MOD 30 MIN: CPT | Performed by: INTERNAL MEDICINE

## 2024-07-02 NOTE — PROGRESS NOTES
Subjective:   Patient ID: Behrooz Fallahi is a 74 year old male.    /76   Pulse 101   Temp 97.8 °F (36.6 °C)   Resp 18   Wt 180 lb (81.6 kg)   SpO2 97%   BMI 26.58 kg/m²     HPI  Here for htn, hyperchol, dm, sees endo but has not done urine yet, co chronic low back ache worse with movement, no weekness or radiation  History/Other:   Review of Systems   Constitutional:  Negative for fever.   Eyes:  Negative for visual disturbance.   Respiratory:  Negative for shortness of breath.    Cardiovascular:  Negative for chest pain.   Endocrine: Negative for polyuria.   Neurological:  Negative for headaches.     Current Outpatient Medications   Medication Sig Dispense Refill    lisinopril 20 MG Oral Tab Take 1 tablet (20 mg total) by mouth daily. 90 tablet 3    atorvastatin 20 MG Oral Tab Take 1 tablet (20 mg total) by mouth nightly. 90 tablet 3    semaglutide (OZEMPIC, 2 MG/DOSE,) 8 MG/3ML Subcutaneous Solution Pen-injector Inject 2 mg into the skin once a week. 9 mL 1    metFORMIN HCl 1000 MG Oral Tab Take 1 tablet (1,000 mg total) by mouth 2 (two) times daily with meals. 180 tablet 1    dapagliflozin (FARXIGA) 10 MG Oral Tab Take 1 tablet (10 mg total) by mouth every morning. 90 tablet 1    Continuous Blood Gluc  (DEXCOM G7 ) Does not apply Device USE 1 DEVICE AS DIRECTED      Continuous Blood Gluc Sensor (DEXCOM G7 SENSOR) Does not apply Misc 1 each Every 10 days. 9 each 3    lactulose 10 GM/15ML Oral Solution Take 15 mL (10 g total) by mouth 2 (two) times daily.      cyanocobalamin 1000 MCG/ML Injection Solution       MEMANTINE HCL 10 MG Oral Tab TAKE 1 TABLET BY MOUTH TWICE A DAY 60 tablet 0    Calcium Carbonate Antacid 600 MG Oral Chew Tab Chew 600 mg by mouth as needed.        Coenzyme Q10 (COQ10) 400 MG Oral Cap Take by mouth.      Omega-3 Fatty Acids (FISH OIL) 600 MG Oral Cap Take  by mouth.      Dgvgktbpt-Jycaxizqv-Awkfsojldw (CEREFOLIN NAC) 6-2-600 MG Oral Tab Take 1 tablet by mouth  daily. (Patient not taking: Reported on 7/2/2024)       Allergies:  Allergies   Allergen Reactions    Other RASH and ITCHING     UV rays/Sunlight       Objective:   Physical Exam  Constitutional:       Appearance: Normal appearance.   HENT:      Head: Normocephalic and atraumatic.      Right Ear: Tympanic membrane normal.      Left Ear: Tympanic membrane normal.   Eyes:      Pupils: Pupils are equal, round, and reactive to light.   Cardiovascular:      Rate and Rhythm: Regular rhythm.      Heart sounds: No murmur heard.  Pulmonary:      Breath sounds: Normal breath sounds.   Abdominal:      Palpations: Abdomen is soft.   Musculoskeletal:         General: No swelling.      Cervical back: Neck supple.   Skin:     General: Skin is warm.   Neurological:      General: No focal deficit present.      Mental Status: He is alert.      Comments: Bilateral barefoot skin diabetic exam is normal, visualized feet and the appearance is normal.  Bilateral monofilament/sensation of both feet is normal.  Pulsation pedal pulse exam of both lower legs/feet is normal as well.         Psychiatric:         Mood and Affect: Mood normal.         Assessment & Plan:   1. Type 2 diabetes mellitus without complication, without long-term current use of insulin ([facility]) foot exam done, urine for micro sent, fu with endo-   2. Other hyperlipidemia -cont meds, follow labs   3. Essential hypertension -cont meds, follow up    4. Chronic bilateral low back pain without sciatica -to PT   Fu in 6 months    No orders of the defined types were placed in this encounter.      Meds This Visit:  Requested Prescriptions      No prescriptions requested or ordered in this encounter       Imaging & Referrals:  None

## 2024-08-01 RX ORDER — DAPAGLIFLOZIN 10 MG/1
10 TABLET, FILM COATED ORAL EVERY MORNING
Qty: 90 TABLET | Refills: 1 | Status: SHIPPED | OUTPATIENT
Start: 2024-08-01

## 2024-08-05 ENCOUNTER — OFFICE VISIT (OUTPATIENT)
Facility: CLINIC | Age: 74
End: 2024-08-05
Payer: MEDICARE

## 2024-08-05 VITALS — HEIGHT: 69 IN | OXYGEN SATURATION: 98 % | BODY MASS INDEX: 26.66 KG/M2 | WEIGHT: 180 LBS

## 2024-08-05 DIAGNOSIS — E11.65 TYPE 2 DIABETES MELLITUS WITH HYPERGLYCEMIA, WITHOUT LONG-TERM CURRENT USE OF INSULIN (HCC): ICD-10-CM

## 2024-08-05 DIAGNOSIS — E11.9 TYPE 2 DIABETES MELLITUS WITHOUT COMPLICATION, WITHOUT LONG-TERM CURRENT USE OF INSULIN (HCC): Primary | ICD-10-CM

## 2024-08-05 LAB — HEMOGLOBIN A1C: 7.9 % (ref 4.3–5.6)

## 2024-08-05 PROCEDURE — 99214 OFFICE O/P EST MOD 30 MIN: CPT | Performed by: STUDENT IN AN ORGANIZED HEALTH CARE EDUCATION/TRAINING PROGRAM

## 2024-08-05 PROCEDURE — 83036 HEMOGLOBIN GLYCOSYLATED A1C: CPT | Performed by: STUDENT IN AN ORGANIZED HEALTH CARE EDUCATION/TRAINING PROGRAM

## 2024-08-05 RX ORDER — SEMAGLUTIDE 2.68 MG/ML
2 INJECTION, SOLUTION SUBCUTANEOUS WEEKLY
Qty: 9 ML | Refills: 1 | Status: SHIPPED | OUTPATIENT
Start: 2024-08-05

## 2024-08-05 RX ORDER — ACYCLOVIR 400 MG/1
1 TABLET ORAL
Qty: 9 EACH | Refills: 3 | Status: SHIPPED | OUTPATIENT
Start: 2024-08-05

## 2024-08-05 NOTE — PATIENT INSTRUCTIONS
Let's check lab tests (they do not have to be fasting) to make sure you are still producing insulin naturally.   Try to move your largest meal of the day to lunch.   Increase your intake of drinking pure water.   Try to go to the gym in a regular manner.   We sent over your refills.     Return Visit   [  ] Physician in 3 months (virtual is okay)

## 2024-08-06 ENCOUNTER — LAB ENCOUNTER (OUTPATIENT)
Dept: LAB | Facility: HOSPITAL | Age: 74
End: 2024-08-06
Attending: STUDENT IN AN ORGANIZED HEALTH CARE EDUCATION/TRAINING PROGRAM
Payer: MEDICARE

## 2024-08-06 DIAGNOSIS — E11.65 TYPE 2 DIABETES MELLITUS WITH HYPERGLYCEMIA, WITHOUT LONG-TERM CURRENT USE OF INSULIN (HCC): ICD-10-CM

## 2024-08-06 DIAGNOSIS — E11.9 TYPE 2 DIABETES MELLITUS WITHOUT COMPLICATION, WITHOUT LONG-TERM CURRENT USE OF INSULIN (HCC): ICD-10-CM

## 2024-08-06 LAB
ANION GAP SERPL CALC-SCNC: 4 MMOL/L (ref 0–18)
BUN BLD-MCNC: 13 MG/DL (ref 9–23)
CALCIUM BLD-MCNC: 9.7 MG/DL (ref 8.7–10.4)
CHLORIDE SERPL-SCNC: 104 MMOL/L (ref 98–112)
CHOLEST SERPL-MCNC: 145 MG/DL (ref ?–200)
CO2 SERPL-SCNC: 28 MMOL/L (ref 21–32)
CREAT BLD-MCNC: 0.88 MG/DL
CREAT UR-SCNC: 47.8 MG/DL
EGFRCR SERPLBLD CKD-EPI 2021: 91 ML/MIN/1.73M2 (ref 60–?)
FASTING PATIENT LIPID ANSWER: YES
FASTING STATUS PATIENT QL REPORTED: YES
GLUCOSE BLD-MCNC: 146 MG/DL (ref 70–99)
HDLC SERPL-MCNC: 39 MG/DL (ref 40–59)
LDLC SERPL CALC-MCNC: 77 MG/DL (ref ?–100)
MICROALBUMIN UR-MCNC: <0.5 MG/DL
NONHDLC SERPL-MCNC: 106 MG/DL (ref ?–130)
OSMOLALITY SERPL CALC.SUM OF ELEC: 285 MOSM/KG (ref 275–295)
POTASSIUM SERPL-SCNC: 4.2 MMOL/L (ref 3.5–5.1)
SODIUM SERPL-SCNC: 136 MMOL/L (ref 136–145)
TRIGL SERPL-MCNC: 167 MG/DL (ref 30–149)
VLDLC SERPL CALC-MCNC: 26 MG/DL (ref 0–30)

## 2024-08-06 PROCEDURE — 86341 ISLET CELL ANTIBODY: CPT

## 2024-08-06 PROCEDURE — 80061 LIPID PANEL: CPT

## 2024-08-06 PROCEDURE — 36415 COLL VENOUS BLD VENIPUNCTURE: CPT

## 2024-08-06 PROCEDURE — 82043 UR ALBUMIN QUANTITATIVE: CPT

## 2024-08-06 PROCEDURE — 82570 ASSAY OF URINE CREATININE: CPT

## 2024-08-06 PROCEDURE — 84681 ASSAY OF C-PEPTIDE: CPT

## 2024-08-06 PROCEDURE — 80048 BASIC METABOLIC PNL TOTAL CA: CPT

## 2024-08-07 LAB — C-PEPTIDE: 3.2 NG/ML

## 2024-08-07 NOTE — PROGRESS NOTES
[facility]    Name: Behrooz Fallahi    Date: 8/5/24    Behrooz Fallahi is a 74 year old male who presents for evaluation of T2DM management.     Chief complaint: Diabetes (Type 2 Dm 3mo F/u . /Last A1c 7.3 5/2024 /A1c today 7.9/Eye Exam - not due /Foot Exam - not due )       Subjective:   Initial HPI consult in May 2024    Interval History:  -Notes sugars have been notably higher the past several months despite strict adherence to medication and diet, no steroid administration or new medications  -No weight loss, jaundice, etc  -Walking 35-40 min every morning  -Dinner is largest meal and often contains rice    DM hx:     -Re: potential DM medication contraindication (if positive, checkbox selected):  [] History of pancreatitis  [] Personal/fam hx of medullary thyroid cancer/MEN2  [] History of recent/frequent UTI/yeast infxn  [] Previous amputation related to diabetes    -Presence of associated DM complications (if positive, checkbox selected):  [] Macrovascular complications (CAD/CVA/PAD)  [] Neuropathy  [] Retinopathy  [] Nephropathy  [] HTN  [] Hyperlipidemia  [] Stroke/TIA  [] Gastroparesis      Continuous Glucose Monitoring Interpretation  Behrooz Fallahi has undergone continuous glucose monitoring with the Dexcom G6 CGM with phone (connected to clinic profile).  The blood glucose tracings were evaluated for two weeks prior to office visit. Blood glucose tracings demonstrated areas of hyperglycemia throughout the entire day, no specific pattern. There were no areas of hypoglycemia noted during the weeks of evaluation.      Very high 24%  High 56%  In range 20%  Low 0%  Very low 0%        Previously trialed/failed DM meds: sulfonylureas (discontinued to hypoglycemia)    History/Other:    Allergies, PMH, SocHx and FHx reviewed and updated as appropriate in Epic on    semaglutide (OZEMPIC, 2 MG/DOSE,) 8 MG/3ML Subcutaneous Solution Pen-injector Inject 2 mg into the skin once a week. 9 mL 1     Continuous Glucose Sensor (DEXCOM G7 SENSOR) Does not apply Misc 1 each Every 10 days. 9 each 3    metFORMIN HCl 1000 MG Oral Tab Take 1 tablet (1,000 mg total) by mouth 2 (two) times daily with meals. 180 tablet 1    FARXIGA 10 MG Oral Tab TAKE 1 TABLET (10 MG TOTAL) BY MOUTH EVERY MORNING. 90 tablet 1    lisinopril 20 MG Oral Tab Take 1 tablet (20 mg total) by mouth daily. 90 tablet 3    atorvastatin 20 MG Oral Tab Take 1 tablet (20 mg total) by mouth nightly. 90 tablet 3    Continuous Blood Gluc  (DEXCOM G7 ) Does not apply Device USE 1 DEVICE AS DIRECTED      lactulose 10 GM/15ML Oral Solution Take 15 mL (10 g total) by mouth 2 (two) times daily.      cyanocobalamin 1000 MCG/ML Injection Solution       MEMANTINE HCL 10 MG Oral Tab TAKE 1 TABLET BY MOUTH TWICE A DAY 60 tablet 0    Calcium Carbonate Antacid 600 MG Oral Chew Tab Chew 600 mg by mouth as needed.        Coenzyme Q10 (COQ10) 400 MG Oral Cap Take by mouth.      Wjxiknejj-Rbqquwkwf-Emyjoaahma (CEREFOLIN NAC) 6-2-600 MG Oral Tab Take 1 tablet by mouth daily.      Omega-3 Fatty Acids (FISH OIL) 600 MG Oral Cap Take  by mouth.       Allergies   Allergen Reactions    Other RASH and ITCHING     UV rays/Sunlight     Current Outpatient Medications   Medication Sig Dispense Refill    semaglutide (OZEMPIC, 2 MG/DOSE,) 8 MG/3ML Subcutaneous Solution Pen-injector Inject 2 mg into the skin once a week. 9 mL 1    Continuous Glucose Sensor (DEXCOM G7 SENSOR) Does not apply Misc 1 each Every 10 days. 9 each 3    metFORMIN HCl 1000 MG Oral Tab Take 1 tablet (1,000 mg total) by mouth 2 (two) times daily with meals. 180 tablet 1    FARXIGA 10 MG Oral Tab TAKE 1 TABLET (10 MG TOTAL) BY MOUTH EVERY MORNING. 90 tablet 1    lisinopril 20 MG Oral Tab Take 1 tablet (20 mg total) by mouth daily. 90 tablet 3    atorvastatin 20 MG Oral Tab Take 1 tablet (20 mg total) by mouth nightly. 90 tablet 3    Continuous Blood Gluc  (DEXCOM G7 ) Does not apply  Device USE 1 DEVICE AS DIRECTED      lactulose 10 GM/15ML Oral Solution Take 15 mL (10 g total) by mouth 2 (two) times daily.      cyanocobalamin 1000 MCG/ML Injection Solution       MEMANTINE HCL 10 MG Oral Tab TAKE 1 TABLET BY MOUTH TWICE A DAY 60 tablet 0    Calcium Carbonate Antacid 600 MG Oral Chew Tab Chew 600 mg by mouth as needed.        Coenzyme Q10 (COQ10) 400 MG Oral Cap Take by mouth.      Kasedhaob-Wofixtwfs-Fsfpwhuifx (CEREFOLIN NAC) 6-2-600 MG Oral Tab Take 1 tablet by mouth daily.      Omega-3 Fatty Acids (FISH OIL) 600 MG Oral Cap Take  by mouth.       Past Medical History:    Essential hypertension    Hematuria    Hyperlipidemia    Kidney stones    Obstructive sleep apnea (adult) (pediatric)    [facility]  - AHI 6 autoPAP 6-16 Sleep RX     Type II or unspecified type diabetes mellitus without mention of complication, not stated as uncontrolled     Family History   Problem Relation Age of Onset    Cancer Father         prostate     Diabetes Mother     Diabetes Sister     Diabetes Sister      Social history: Reviewed.      ROS/Exam    REVIEW OF SYSTEMS: Ten point review of systems has been performed and is otherwise negative and/or non-contributory, except as described above.     VITALS  Vitals:    08/05/24 1317   SpO2: 98%   Weight: 180 lb (81.6 kg)   Height: 5' 9\" (1.753 m)       Wt Readings from Last 6 Encounters:   08/05/24 180 lb (81.6 kg)   07/02/24 180 lb (81.6 kg)   05/03/24 179 lb (81.2 kg)   12/22/23 179 lb 6.4 oz (81.4 kg)   10/31/23 182 lb 3.2 oz (82.6 kg)   07/17/23 182 lb (82.6 kg)       PHYSICAL EXAM  CONSTITUTIONAL:  awake, alert, cooperative, no apparent distress, and appears stated age   PSYCH: normal affect  LUNGS: breathing comfortably  CARDIOVASCULAR:  regular rate   NECK:  no palpable thyroid nodules     Labs/Imaging: Pertinent imaging reviewed.    Overall glucose control:  Lab Results   Component Value Date    A1C 7.9 (A) 08/05/2024    A1C 7.3 (A) 05/03/2024    A1C 7.1  (A) 10/31/2023    A1C 8.0 (A) 07/17/2023    A1C 7.5 (A) 03/13/2023       Supplementary Documentation:   -Surveillance for Diabetes Complications & Risks  Foot exam/neuropathy: Last Foot Exam: 07/02/24    Retinopathy screening: Last Dilated Eye Exam: 07/23/23  Eye Exam shows Diabetic Retinopathy?: No      Assessment & Plan:     ICD-10-CM    1. Type 2 diabetes mellitus without complication, without long-term current use of insulin (HCC)  E11.9 POC Hgb A1C     Basic Metabolic Panel (8) [E]     C-Peptide [E]     Edmar-65 Autoantibody     Zinc Transporter 8 Antibody     Islet Cell Cytoplasmic Antibody, IgG      2. Type 2 diabetes mellitus with hyperglycemia, without long-term current use of insulin (HCC)  E11.65 semaglutide (OZEMPIC, 2 MG/DOSE,) 8 MG/3ML Subcutaneous Solution Pen-injector     Continuous Glucose Sensor (DEXCOM G7 SENSOR) Does not apply Misc     Lipid Panel [E]          Behrooz Fallahi is a pleasant 74 year old male here for evaluation of:    #Diabetes- PMHx of Type 2 diabetes mellitus.     Last A1c value was 7.9% done 8/5/2024.  Goal <7%. Importance of better glucose control in preventing onset/progression of end-organ damage discussed, as well as goals of therapy and clinical significance of A1C.  -Current meds: Ozempic 2mg/week, metformin 1000mg BID, Farxiga 10mg daily  -Previous: sulfonylureas (hypoglycemia)  -Reviewed multifactorial nature of successful glycemic control involving pharmacotherapy, diet and carbohydrate management, and optimization of physical activity. Reviewed goals of therapy with DM and rationale of A1C/glycemic goals in prevention of development/progression of organ damage and diabetes complications.  -Reviewed previous clinical course for diabetes, reassured patient that his diabetes is well-managed and his A1c is at goal for age though we can always try to optimize  -Reviewed that avoidance of hypoglycemia is key in his age group  -Noted loss of glycemic control over the last  several months despite adherence to medication and relatively strict lifestyle control  -Obtain JOSIE labs; if negative consider additional pharmacotherapy  -Pt will increase physical activity and move largest meal of the day to lunch    -See above header \"Supplementary Documentation\" for surveillance for diabetes complications & risks    #Nephropathy screening/CKD:   Lab Results   Component Value Date    EGFRCR 91 08/06/2024    MICROALBCREA 23.5 03/13/2023      #Blood pressure control: SBP is not to goal <130   BP Readings from Last 1 Encounters:   07/02/24 138/76   BP Meds: lisinopril Tabs - 20 MG     #Hyperlipidemia/Lipids: LDL is to goal   Lab Results   Component Value Date    LDL 77 08/06/2024    TRIG 167 (H) 08/06/2024   Cholesterol Meds: atorvastatin Tabs - 20 MG          No follow-ups on file.    8/5/24  Amalia Olguin,     Note to patient: The 21 Century Cures Act makes medical notes like these available to patients in the interest of transparency. However, be advised this is a medical document. It is intended as peer to peer communication. It is written in medical language and may contain abbreviations or verbiage that are unfamiliar. It may appear blunt or direct. Medical documents are intended to carry relevant information, facts as evident, and the clinical opinion of the practitioner.

## 2024-08-08 LAB
GAD-65: <5 U/ML
PANCREATIC ISLET CELLS: NEGATIVE

## 2024-08-15 LAB — ZNT8 ABS: <15 U/ML

## 2024-08-21 ENCOUNTER — TELEPHONE (OUTPATIENT)
Facility: CLINIC | Age: 74
End: 2024-08-21

## 2024-08-21 NOTE — TELEPHONE ENCOUNTER
Continued from results management note.    \"Please let Behrooz know that his labs are NEGATIVE for type 1 diabetes. However, we still have insufficient control of his sugars without a clear explanation.    Options for additional medications are as follows:  1) Once daily long acting insulin  2) Once daily PO Actos (potential side effects are an increased risk of swelling and a tiny potential increased risk of bladder cancer, should not be used in patients with family history of bladder Ca or patients with history of working with chemicals/paints that can cause bladder Ca)  3) Repaglinide 1mg TID prior to meals (this is like a short acting glipizide which is taken only prior to meals).    He may also benefit from meeting with Samaria to review diet trends.    Thanks!\"    Patient made aware of all the above per Dr. Olguin. Patient states has a strong extended  familial (mostly female) history of cancer; colon, breast, but no reports of bladder cancer specifically.    Patient wants to discuss medication options and visit with Mera with his physician wife and patient to call office back once he's decided how he'd like to proceed. RN will send patient a [facility] message Dr. Olguin's options/information.    Waiting to hear back from patient.    Message routed to Dr. Olguin.

## 2024-11-15 ENCOUNTER — OFFICE VISIT (OUTPATIENT)
Facility: CLINIC | Age: 74
End: 2024-11-15
Payer: MEDICARE

## 2024-11-15 VITALS
BODY MASS INDEX: 26.9 KG/M2 | WEIGHT: 181.63 LBS | HEIGHT: 69 IN | HEART RATE: 88 BPM | SYSTOLIC BLOOD PRESSURE: 132 MMHG | OXYGEN SATURATION: 99 % | DIASTOLIC BLOOD PRESSURE: 80 MMHG | RESPIRATION RATE: 18 BRPM

## 2024-11-15 DIAGNOSIS — E11.9 TYPE 2 DIABETES MELLITUS WITHOUT COMPLICATION, WITHOUT LONG-TERM CURRENT USE OF INSULIN (HCC): Primary | ICD-10-CM

## 2024-11-15 LAB — HEMOGLOBIN A1C: 7.6 % (ref 4.3–5.6)

## 2024-11-15 PROCEDURE — 99215 OFFICE O/P EST HI 40 MIN: CPT | Performed by: STUDENT IN AN ORGANIZED HEALTH CARE EDUCATION/TRAINING PROGRAM

## 2024-11-15 PROCEDURE — 95249 CONT GLUC MNTR PT PROV EQP: CPT | Performed by: STUDENT IN AN ORGANIZED HEALTH CARE EDUCATION/TRAINING PROGRAM

## 2024-11-15 PROCEDURE — 83036 HEMOGLOBIN GLYCOSYLATED A1C: CPT | Performed by: STUDENT IN AN ORGANIZED HEALTH CARE EDUCATION/TRAINING PROGRAM

## 2024-11-15 RX ORDER — REPAGLINIDE 1 MG/1
1 TABLET ORAL
Qty: 90 TABLET | Refills: 1 | Status: SHIPPED | OUTPATIENT
Start: 2024-11-15

## 2024-11-15 NOTE — PROGRESS NOTES
[facility]    Name: Behrooz Fallahi    Date: 11/15/24    Behrooz Fallahi is a 74 year old male who presents for evaluation of T2DM management.     Chief complaint: Follow - Up (Pt here for f/u for diabetes, Dexcom CGM, /A1C-7.6/Last A1C-08/05/2024 7.9/Last Foot exam- 07/02/2024/Last Eye exam- 06/05/2024)       Subjective:   Initial HPI consult in May 2024    Interval History 8/5/24:  -Notes sugars have been notably higher the past several months despite strict adherence to medication and diet, no steroid administration or new medications  -No weight loss, jaundice, etc  -Walking 35-40 min every morning  -Dinner is largest meal and often contains rice    Interval History 11/15/24:  -Labs 8/6/24: , C-peptide measurable 3.2, LEIGH 65 negative, ZNT8 negative, pancreatic islet cell negative  -Not exercising much  -Predictable mealtime spikes with lunch, late afternoon, and dinner  -Feeling like his mental processing and focus is a little slower these days      DM hx:     -Re: potential DM medication contraindication (if positive, checkbox selected):  [] History of pancreatitis  [] Personal/fam hx of medullary thyroid cancer/MEN2  [] History of recent/frequent UTI/yeast infxn  [] Previous amputation related to diabetes    -Presence of associated DM complications (if positive, checkbox selected):  [] Macrovascular complications (CAD/CVA/PAD)  [] Neuropathy  [] Retinopathy  [] Nephropathy  [] HTN  [] Hyperlipidemia  [] Stroke/TIA  [] Gastroparesis      Continuous Glucose Monitoring Interpretation  Behrooz Fallahi has undergone continuous glucose monitoring with the Dexcom G7 CGM with reader.  The blood glucose tracings were evaluated for two weeks prior to office visit. Blood glucose tracings demonstrated areas of hyperglycemia after lunch, afternoon snack, and dinner . There were no areas of hypoglycemia noted during the weeks of evaluation.      Very high 14%  High 40%  In range 46%  Low 0%  Very  low 0%        Previously trialed/failed DM meds: sulfonylureas (discontinued to hypoglycemia)    History/Other:    Allergies, PMH, SocHx and FHx reviewed and updated as appropriate in Epic on    Repaglinide 1 MG Oral Tab Take 1 tablet (1 mg total) by mouth 3 (three) times daily before meals. 90 tablet 1    semaglutide (OZEMPIC, 2 MG/DOSE,) 8 MG/3ML Subcutaneous Solution Pen-injector Inject 2 mg into the skin once a week. 9 mL 1    Continuous Glucose Sensor (DEXCOM G7 SENSOR) Does not apply Misc 1 each Every 10 days. 9 each 3    metFORMIN HCl 1000 MG Oral Tab Take 1 tablet (1,000 mg total) by mouth 2 (two) times daily with meals. 180 tablet 1    FARXIGA 10 MG Oral Tab TAKE 1 TABLET (10 MG TOTAL) BY MOUTH EVERY MORNING. 90 tablet 1    lisinopril 20 MG Oral Tab Take 1 tablet (20 mg total) by mouth daily. 90 tablet 3    atorvastatin 20 MG Oral Tab Take 1 tablet (20 mg total) by mouth nightly. 90 tablet 3    Continuous Blood Gluc  (DEXCOM G7 ) Does not apply Device USE 1 DEVICE AS DIRECTED      lactulose 10 GM/15ML Oral Solution Take 15 mL (10 g total) by mouth 2 (two) times daily.      cyanocobalamin 1000 MCG/ML Injection Solution       MEMANTINE HCL 10 MG Oral Tab TAKE 1 TABLET BY MOUTH TWICE A DAY 60 tablet 0    Calcium Carbonate Antacid 600 MG Oral Chew Tab Chew 600 mg by mouth as needed.        Coenzyme Q10 (COQ10) 400 MG Oral Cap Take by mouth.      Xnlobetir-Lpmmzilnm-Msokpsebqm (CEREFOLIN NAC) 6-2-600 MG Oral Tab Take 1 tablet by mouth daily.      Omega-3 Fatty Acids (FISH OIL) 600 MG Oral Cap Take  by mouth.       Allergies   Allergen Reactions    Other RASH and ITCHING     UV rays/Sunlight     Current Outpatient Medications   Medication Sig Dispense Refill    Repaglinide 1 MG Oral Tab Take 1 tablet (1 mg total) by mouth 3 (three) times daily before meals. 90 tablet 1    semaglutide (OZEMPIC, 2 MG/DOSE,) 8 MG/3ML Subcutaneous Solution Pen-injector Inject 2 mg into the skin once a week. 9 mL 1     Continuous Glucose Sensor (DEXCOM G7 SENSOR) Does not apply Misc 1 each Every 10 days. 9 each 3    metFORMIN HCl 1000 MG Oral Tab Take 1 tablet (1,000 mg total) by mouth 2 (two) times daily with meals. 180 tablet 1    FARXIGA 10 MG Oral Tab TAKE 1 TABLET (10 MG TOTAL) BY MOUTH EVERY MORNING. 90 tablet 1    lisinopril 20 MG Oral Tab Take 1 tablet (20 mg total) by mouth daily. 90 tablet 3    atorvastatin 20 MG Oral Tab Take 1 tablet (20 mg total) by mouth nightly. 90 tablet 3    Continuous Blood Gluc  (DEXCOM G7 ) Does not apply Device USE 1 DEVICE AS DIRECTED      lactulose 10 GM/15ML Oral Solution Take 15 mL (10 g total) by mouth 2 (two) times daily.      cyanocobalamin 1000 MCG/ML Injection Solution       MEMANTINE HCL 10 MG Oral Tab TAKE 1 TABLET BY MOUTH TWICE A DAY 60 tablet 0    Calcium Carbonate Antacid 600 MG Oral Chew Tab Chew 600 mg by mouth as needed.        Coenzyme Q10 (COQ10) 400 MG Oral Cap Take by mouth.      Hmaooheqs-Bdzmekeqf-Pdfwwsdoyf (CEREFOLIN NAC) 6-2-600 MG Oral Tab Take 1 tablet by mouth daily.      Omega-3 Fatty Acids (FISH OIL) 600 MG Oral Cap Take  by mouth.       Past Medical History:    Essential hypertension    Hematuria    Hyperlipidemia    Kidney stones    Obstructive sleep apnea (adult) (pediatric)    dx 2015 at [facility]  - AHI 6 autoPAP 6-16 Sleep RX     Type II or unspecified type diabetes mellitus without mention of complication, not stated as uncontrolled     Family History   Problem Relation Age of Onset    Cancer Father         prostate     Diabetes Mother     Diabetes Sister     Diabetes Sister      Social history: Reviewed.      ROS/Exam    REVIEW OF SYSTEMS: Ten point review of systems has been performed and is otherwise negative and/or non-contributory, except as described above.     VITALS  Vitals:    11/15/24 0842   BP: 132/80   Pulse: 88   Resp: 18   SpO2: 99%   Weight: 181 lb 9.6 oz (82.4 kg)   Height: 5' 9\" (1.753 m)         Wt Readings from Last 6  Encounters:   11/15/24 181 lb 9.6 oz (82.4 kg)   08/05/24 180 lb (81.6 kg)   07/02/24 180 lb (81.6 kg)   05/03/24 179 lb (81.2 kg)   12/22/23 179 lb 6.4 oz (81.4 kg)   10/31/23 182 lb 3.2 oz (82.6 kg)       PHYSICAL EXAM  CONSTITUTIONAL:  awake, alert, cooperative, no apparent distress, and appears stated age   PSYCH: normal affect  LUNGS: breathing comfortably  CARDIOVASCULAR:  regular rate   NECK:  no palpable thyroid nodules     Labs/Imaging: Pertinent imaging reviewed.    Overall glucose control:  Lab Results   Component Value Date    A1C 7.6 (A) 11/15/2024    A1C 7.9 (A) 08/05/2024    A1C 7.3 (A) 05/03/2024    A1C 7.1 (A) 10/31/2023    A1C 8.0 (A) 07/17/2023       Supplementary Documentation:   -Surveillance for Diabetes Complications & Risks  Foot exam/neuropathy: Last Foot Exam: 07/02/24    Retinopathy screening: Last Dilated Eye Exam: 06/05/24  Eye Exam shows Diabetic Retinopathy?: No      Assessment & Plan:     ICD-10-CM    1. Type 2 diabetes mellitus without complication, without long-term current use of insulin (HCC)  E11.9 POC Hemoglobin A1C     Repaglinide 1 MG Oral Tab            Behrooz Fallahi is a pleasant 74 year old male here for evaluation of:    #Diabetes- PMHx of Type 2 diabetes mellitus.     Last A1c value was 7.6% done 11/15/2024.  Goal <7.5%. Importance of better glucose control in preventing onset/progression of end-organ damage discussed, as well as goals of therapy and clinical significance of A1C.  -Current meds: Ozempic 2mg/week, metformin 1000mg BID, Farxiga 10mg daily  -Previous: sulfonylureas (hypoglycemia)  -Reviewed multifactorial nature of successful glycemic control involving pharmacotherapy, diet and carbohydrate management, and optimization of physical activity. Reviewed goals of therapy with DM and rationale of A1C/glycemic goals in prevention of development/progression of organ damage and diabetes complications.  -Reviewed previous clinical course for diabetes, reassured  patient that his diabetes is well-managed and his A1c is at goal for age though we can always try to optimize  -Reviewed that avoidance of hypoglycemia is key in his age group  -JOSIE antibodies/labs negative  -On review of CGM, pt still with notable post-prandial spikes. Ideally would like to pursue A1C of 7.5% if achievable without hypoglycemia. Prefer to avoid insuli therapy. After discussion of options pt would like trial of repaglinide prior to large meals, reviewed appropriate administration  -Pt to reintroduce exercise/walking    -See above header \"Supplementary Documentation\" for surveillance for diabetes complications & risks    #Nephropathy screening/CKD:   Lab Results   Component Value Date    EGFRCR 91 08/06/2024    MICROALBCREA 23.5 03/13/2023      #Blood pressure control: SBP is not to goal <130   BP Readings from Last 1 Encounters:   11/15/24 132/80   BP Meds: lisinopril Tabs - 20 MG     #Hyperlipidemia/Lipids: LDL is to goal   Lab Results   Component Value Date    LDL 77 08/06/2024    TRIG 167 (H) 08/06/2024   Cholesterol Meds: atorvastatin Tabs - 20 MG      The above plan was discussed in detail with the patient who verbalized understanding and agreement.      A total of 40 minutes was spent today on obtaining history, reviewing outside records, reviewing pertinent labs/imaging, reviewing relevant pathophysiology with patient, evaluating patient, providing multiple treatment options, communicating with patient's other providers as appropriate, and completing documentation and orders.      Amalia Olguin DO  [facility]  11/15/2024     Note to patient: The 21 Century Cures Act makes medical notes like these available to patients in the interest of transparency. However, be advised this is a medical document. It is intended as peer to peer communication. It is written in medical language and may contain abbreviations or verbiage that are unfamiliar. It may appear blunt or direct. Medical documents  are intended to carry relevant information, facts as evident, and the clinical opinion of the practitioner.      In reviewing this note, please be advised that Dragon Voice Recognition software used to dictate the note may have made errors in recognizing some of the words or phrases.

## 2024-11-15 NOTE — PATIENT INSTRUCTIONS
Summary of today's visit:  We'll continue the same Ozempic, Jardiance, metformin.   START repaglinide 1mg BEFORE lunch and BEFORE dinner. You can take this 10-30 minutes before the meal.  Keep improving your exercise as this will bring the sugar.  Please schedule your diabetic eye exam prior to your follow up visit and have your doctor fax the report to our office.    General follow up information:  Please let us know if you require any refills at least 1 week prior to your medication running out. If you do run out of medication, please call our office ASAP to request refills (do not wait until your follow up).   Please call our office if sugars at home are consistently greater than 250 or less than 70 for medication adjustment (do not wait until your follow up appointment).  The on-call pager is for urgent matters only. If you are a type 1 diabetic and run out of insulin after business hours 8AM-4PM, you may call the on-call pager for a refill to a 24 hour pharmacy. All other refill requests should be requested during business hours.    Return Visit:  []  Physician in 3 months      HOW TO TREAT LOW BLOOD SUGAR (Hypoglycemia)  Low blood sugar = Less than 70, or if you start to have symptoms  Symptoms: Shaking or trembling, fast heart rate, extreme hunger, sweating, confusion/difficulty concentrating, dizziness    How to treat a low blood sugar if you are able to eat/drink: The Rule of 15/15  If you are using a continuous glucose monitor that says you are low, but you do not have any symptoms, verify on fingerstick that your blood sugar is actually <70 before treating.   Eat 15 grams of carbs (see examples below)  Check your blood sugar after 15 minutes. If it’s still below your target range, have another serving.   Repeat these steps until sugar is >90. Once it’s in range, if you're nervous about your sugar going low again, have a protein source (ie, a spoonful of peanut butter).   If you have a CGM, you want to  look for how your arrow has changed. If you arrow is pointed up or sideways after 15 min, give your CGM more time OR check with a finger stick. Try not to eat more food until at least 15 min after the first BG check - otherwise you risk spiking your sugar too high.  If you are experiencing symptoms and you are unable to check your blood glucose for any reason, treat the hypoglycemia.  If someone has a low blood sugar and is unconscious: Don’t hesitate to call 911. Use emergency glucagon. If someone is unconscious and glucagon is not available or someone does not know how to use it, call 911 immediately.     To treat a low, carry with you easy, pre-portioned treatment for low blood sugars that are 15G of carbs:   - Children sized squeeze pouch applesauce (high fiber + carbs help prevent too high of a spike)  - Small children's sized juicebox- 15g carb --> 4oz juice box  - Glucose tablets from [facility], you can find them near diabetes supplies --> Note, you will need to eat 3-4 tablets to get to 15g of carbs  - Child sized fruit snack pack- look for one with 15 grams of total carbohydrate  - Choice of how to treat your low is important. Complex carbs, or foods that contain fats along with carbs (like chocolate) can slow the absorption of glucose and should NOT be used to treat an emergency low.

## 2024-12-09 DIAGNOSIS — E11.65 TYPE 2 DIABETES MELLITUS WITH HYPERGLYCEMIA, WITHOUT LONG-TERM CURRENT USE OF INSULIN (HCC): Primary | ICD-10-CM

## 2024-12-09 RX ORDER — DAPAGLIFLOZIN 10 MG/1
10 TABLET, FILM COATED ORAL EVERY MORNING
Qty: 90 TABLET | Refills: 1 | Status: SHIPPED | OUTPATIENT
Start: 2024-12-09

## 2024-12-09 NOTE — TELEPHONE ENCOUNTER
Requested Prescriptions     Pending Prescriptions Disp Refills    dapagliflozin (FARXIGA) 10 MG Oral Tab 90 tablet 1     Sig: Take 1 tablet (10 mg total) by mouth every morning.     HGBA1C:    Lab Results   Component Value Date    A1C 7.6 (A) 11/15/2024    A1C 7.9 (A) 08/05/2024    A1C 7.3 (A) 05/03/2024     (H) 10/06/2021     Your Appointments      Tuesday December 17, 2024 2:00 PM  Medicare Annual Well Visit with Armando Antunez MD  [facility], [address] [place] [address] ([facility]) [address] [facility] [address]  [facility] [address] [phone]        Friday February 21, 2025 9:30 AM  Follow Up Visit with Amalia Olguin DO  [facility], [facility] ([facility]) [address] [address] [phone]             Last OFFICE VISIT: 10--MIRIAM    Last refill:  8-1-2024-90 tabs with 1 refills-CB    Patient is a patient of Dr. Olguin- will send

## 2024-12-09 NOTE — TELEPHONE ENCOUNTER
Endocrine Refill protocol for oral medications    Protocol Criteria:  PASSED  Reason: N/A    If below requirement is met, send a 90-day supply with 1 refill per provider protocol.    Verify appointment with Endocrinology completed in the last 6 months or scheduled in the next 3 months.    Last completed office visit: Visit date not found   Next scheduled Follow up:   Future Appointments   Date Time Provider Department Center   12/17/2024  2:00 PM Armando Antunez MD [facility]   2/21/2025  9:30 AM Amalia Olguin DO [id number] [facility]

## 2024-12-17 ENCOUNTER — OFFICE VISIT (OUTPATIENT)
Dept: INTERNAL MEDICINE CLINIC | Facility: CLINIC | Age: 74
End: 2024-12-17
Payer: MEDICARE

## 2024-12-17 VITALS
SYSTOLIC BLOOD PRESSURE: 132 MMHG | WEIGHT: 183.19 LBS | HEART RATE: 89 BPM | TEMPERATURE: 97 F | DIASTOLIC BLOOD PRESSURE: 68 MMHG | BODY MASS INDEX: 27 KG/M2 | OXYGEN SATURATION: 98 %

## 2024-12-17 DIAGNOSIS — F03.A0 MILD DEMENTIA WITHOUT BEHAVIORAL DISTURBANCE, PSYCHOTIC DISTURBANCE, MOOD DISTURBANCE, OR ANXIETY, UNSPECIFIED DEMENTIA TYPE (HCC): ICD-10-CM

## 2024-12-17 DIAGNOSIS — G47.33 OSA (OBSTRUCTIVE SLEEP APNEA): ICD-10-CM

## 2024-12-17 DIAGNOSIS — K40.91 RECURRENT LEFT INGUINAL HERNIA: ICD-10-CM

## 2024-12-17 DIAGNOSIS — Z12.5 PROSTATE CANCER SCREENING: ICD-10-CM

## 2024-12-17 DIAGNOSIS — Z00.00 ROUTINE GENERAL MEDICAL EXAMINATION AT A HEALTH CARE FACILITY: Primary | ICD-10-CM

## 2024-12-17 DIAGNOSIS — Z12.11 SPECIAL SCREENING FOR MALIGNANT NEOPLASMS, COLON: ICD-10-CM

## 2024-12-17 DIAGNOSIS — E11.9 TYPE 2 DIABETES MELLITUS WITHOUT COMPLICATION, WITHOUT LONG-TERM CURRENT USE OF INSULIN (HCC): ICD-10-CM

## 2024-12-17 DIAGNOSIS — K42.9 UMBILICAL HERNIA WITHOUT OBSTRUCTION AND WITHOUT GANGRENE: ICD-10-CM

## 2024-12-17 DIAGNOSIS — E78.49 OTHER HYPERLIPIDEMIA: ICD-10-CM

## 2024-12-17 DIAGNOSIS — I10 ESSENTIAL HYPERTENSION: ICD-10-CM

## 2025-01-19 DIAGNOSIS — E11.9 TYPE 2 DIABETES MELLITUS WITHOUT COMPLICATION, WITHOUT LONG-TERM CURRENT USE OF INSULIN (HCC): ICD-10-CM

## 2025-01-20 RX ORDER — REPAGLINIDE 1 MG/1
1 TABLET ORAL
Qty: 90 TABLET | Refills: 1 | Status: SHIPPED | OUTPATIENT
Start: 2025-01-20

## 2025-01-20 NOTE — TELEPHONE ENCOUNTER
Endocrine Refill protocol for oral and injectable diabetic medications    Protocol Criteria:  FAILED  Reason: No labs completed in required time frame    If all below requirements are met, send a 90-day supply with 1 refill per provider protocol.    Verify appointment with Endocrinology completed in the last 6 months or scheduled in the next 3 months.  Verify A1C has been completed within the last 6 months and is below 8.5%     Last completed office visit: 11/15/2024 Amalia Olguin DO   Next scheduled Follow up:   Future Appointments   Date Time Provider Department Center   2/21/2025  9:30 AM Amalia Olguin DO [id number] [facility] [place]   5/5/2025  5:00 PM Armando Antunez MD [facility]      Last A1c result: Last A1c value was 7.6% done 11/15/2024.

## 2025-02-17 NOTE — TELEPHONE ENCOUNTER
Endocrine Refill protocol for metformin    Protocol Criteria:  PASSED  Reason: N/A    If all below requirements are met, send a 90-day supply with 1 refill per provider protocol.     Verify appointment with Endocrinology completed in the last 6 months or scheduled in the next 3 months.  Verify A1C has been completed within the last 6 months and is below 8.5%  Verify last GFR is greater than or equal to 40 in the past 12 months    Last completed office visit:11/15/2024 Amalia Olguin DO   Next scheduled Follow up:   Future Appointments   Date Time Provider Department Center   2/21/2025  9:30 AM Amalia Olguin DO [id number] [facility] [place]   5/5/2025  5:00 PM Armando Antunez MD [facility]       Last GFR result:    Lab Results   Component Value Date    EGFRCR 91 08/06/2024     Last A1c result: Last A1C result: 7.6% done 11/15/2024.

## 2025-02-19 DIAGNOSIS — E11.9 TYPE 2 DIABETES MELLITUS WITHOUT COMPLICATION, WITHOUT LONG-TERM CURRENT USE OF INSULIN (HCC): ICD-10-CM

## 2025-02-19 RX ORDER — REPAGLINIDE 1 MG/1
1 TABLET ORAL
Qty: 90 TABLET | Refills: 1 | OUTPATIENT
Start: 2025-02-19

## 2025-02-19 NOTE — TELEPHONE ENCOUNTER
Endocrine Refill protocol for oral and injectable diabetic medications    Protocol Criteria:  PASSED  Reason: N/A    If all below requirements are met, send a 90-day supply with 1 refill per provider protocol.    Verify appointment with Endocrinology completed in the last 6 months or scheduled in the next 3 months.  Verify A1C has been completed within the last 6 months and is below 8.5%     Last completed office visit: 11/15/2024 Amalia Olguin DO   Next scheduled Follow up:   Future Appointments   Date Time Provider Department Center   2/21/2025  9:30 AM Amalia Olguin DO [id number] [facility] [place]   5/5/2025  5:00 PM Armando Antunez MD [facility]      Last A1c result: Last A1c value was 7.6% done 11/15/2024.

## 2025-02-21 ENCOUNTER — OFFICE VISIT (OUTPATIENT)
Facility: CLINIC | Age: 75
End: 2025-02-21
Payer: MEDICARE

## 2025-02-21 VITALS
OXYGEN SATURATION: 99 % | HEIGHT: 69 IN | WEIGHT: 185 LBS | RESPIRATION RATE: 18 BRPM | BODY MASS INDEX: 27.4 KG/M2 | DIASTOLIC BLOOD PRESSURE: 80 MMHG | HEART RATE: 85 BPM | SYSTOLIC BLOOD PRESSURE: 120 MMHG

## 2025-02-21 DIAGNOSIS — E11.65 TYPE 2 DIABETES MELLITUS WITH HYPERGLYCEMIA, WITHOUT LONG-TERM CURRENT USE OF INSULIN (HCC): Primary | ICD-10-CM

## 2025-02-21 LAB — HEMOGLOBIN A1C: 7.7 % (ref 4.3–5.6)

## 2025-02-21 PROCEDURE — 83036 HEMOGLOBIN GLYCOSYLATED A1C: CPT | Performed by: STUDENT IN AN ORGANIZED HEALTH CARE EDUCATION/TRAINING PROGRAM

## 2025-02-21 PROCEDURE — 95249 CONT GLUC MNTR PT PROV EQP: CPT | Performed by: STUDENT IN AN ORGANIZED HEALTH CARE EDUCATION/TRAINING PROGRAM

## 2025-02-21 PROCEDURE — 99215 OFFICE O/P EST HI 40 MIN: CPT | Performed by: STUDENT IN AN ORGANIZED HEALTH CARE EDUCATION/TRAINING PROGRAM

## 2025-02-21 RX ORDER — TIRZEPATIDE 5 MG/.5ML
5 INJECTION, SOLUTION SUBCUTANEOUS
Qty: 2 ML | Refills: 0 | Status: SHIPPED | OUTPATIENT
Start: 2025-02-21

## 2025-02-21 NOTE — PATIENT INSTRUCTIONS
Summary of today's visit:  Let's switch Ozempic for Mounjaro 5mg once a week. The reason for the switch is because Mounjaro can do a better job of reducing hunger/snacking.   Continue the metformin and Farxiga.   Stop repaglinide.   If your next A1c or sugars are not improved, I will suggest starting insulin once a day.  Please schedule your diabetic eye exam prior to your follow up visit and have your doctor fax the report to our office.  We want to start you on a continuous glucose monitor. You need to call your insurance to find out which continuous glucose monitor is preferred by your insurance (Dexcom or [employer]). You can then call our office to let us know so we can order the correct monitor for you. We will then schedule you with [facility] for training and set up for your glucose monitor.       General follow up information:  Please let us know if you require any refills at least 1 week prior to your medication running out. If you do run out of medication, please call our office ASAP to request refills (do not wait until your follow up).   Please call our office if sugars at home are consistently greater than 250 or less than 70 for medication adjustment (do not wait until your follow up appointment).  Lab results and imaging will typically be reviewed at follow up appointments, or within 3-5 business days of ALL results being in if you do not have an appointment scheduled in the near future. Our office will contact you for any abnormal results requiring more urgent follow up or action.   The on-call pager is for urgent matters only. If you are a type 1 diabetic and run out of insulin after business hours 8AM-4PM, you may call the on-call pager for a refill to a 24 hour pharmacy. All other refill requests should be requested during business hours.      Return Visit:  []  Physician in 2 months      HOW TO TREAT LOW BLOOD SUGAR (Hypoglycemia)  Low blood sugar = Less than 70, or if you start to have symptoms  Symptoms:  Shaking or trembling, fast heart rate, extreme hunger, sweating, confusion/difficulty concentrating, dizziness    How to treat a low blood sugar if you are able to eat/drink: The Rule of 15/15  If you are using a continuous glucose monitor that says you are low, but you do not have any symptoms, verify on fingerstick that your blood sugar is actually <70 before treating.   Eat 15 grams of carbs (see examples below)  Check your blood sugar after 15 minutes. If it’s still below your target range, have another serving.   Repeat these steps until sugar is >90. Once it’s in range, if you're nervous about your sugar going low again, have a protein source (ie, a spoonful of peanut butter).   If you have a CGM, you want to look for how your arrow has changed. If you arrow is pointed up or sideways after 15 min, give your CGM more time OR check with a finger stick. Try not to eat more food until at least 15 min after the first BG check - otherwise you risk spiking your sugar too high.  If you are experiencing symptoms and you are unable to check your blood glucose for any reason, treat the hypoglycemia.  If someone has a low blood sugar and is unconscious: Don’t hesitate to call 911. Use emergency glucagon. If someone is unconscious and glucagon is not available or someone does not know how to use it, call 911 immediately.     To treat a low, carry with you easy, pre-portioned treatment for low blood sugars that are 15G of carbs:   - Children sized squeeze pouch applesauce (high fiber + carbs help prevent too high of a spike)  - Small children's sized juicebox- 15g carb --> 4oz juice box  - Glucose tablets from [employer], you can find them near diabetes supplies --> Note, you will need to eat 3-4 tablets to get to 15g of carbs  - Child sized fruit snack pack- look for one with 15 grams of total carbohydrate  - Choice of how to treat your low is important. Complex carbs, or foods that contain fats along with carbs (like  chocolate) can slow the absorption of glucose and should NOT be used to treat an emergency low.

## 2025-02-21 NOTE — PROGRESS NOTES
[facility]    Name: Behrooz Fallahi    Date: 11/15/24    Behrooz Fallahi is a 74 year old male who presents for evaluation of T2DM management.     Chief complaint: Follow - Up (Pt here for f/u on diabetes, Dexcom CGM, No other concerns /A1C-7.7/Last A1C-11/15/2024 7.6/Last Foot exam- 07/02/2024/Last Eye exam- 06/05/2024)       Subjective:   Initial HPI consult in May 2024    Interval History 8/5/24:  -Notes sugars have been notably higher the past several months despite strict adherence to medication and diet, no steroid administration or new medications  -No weight loss, jaundice, etc  -Walking 35-40 min every morning  -Dinner is largest meal and often contains rice    Interval History 11/15/24:  -Labs 8/6/24: , C-peptide measurable 3.2, LEIGH 65 negative, ZNT8 negative, pancreatic islet cell negative  -Not exercising much  -Predictable mealtime spikes with lunch, late afternoon, and dinner  -Feeling like his mental processing and focus is a little slower these days    Interval History 2/21/25:  -A1c 7.7% today  -Reports adherence to Ozempic 2 mg weekly (occasionally taking 10 days apart), Farxiga 10 mg daily, metformin 1000 mg twice daily, repaglinide 1 to 2 mg 3 times daily before meals (occasionally taking at the start of meal)  -On review of CGM as below, generally elevated sugars throughout the day with GMI of 8.4%  -Patient reports he struggling to avoid snacking and is eating a lot of high carb foods such as karina bread, dates, eating multiple fruits at 1 time, etc.  Struggling with these cravings and feels this is causing the spike in his sugar.  -Less motivated to exercise though he is taking daily walks with his wife    DM hx:     -Re: potential DM medication contraindication (if positive, checkbox selected):  [] History of pancreatitis  [] Personal/fam hx of medullary thyroid cancer/MEN2  [] History of recent/frequent UTI/yeast infxn  [] Previous amputation related to  diabetes    -Presence of associated DM complications (if positive, checkbox selected):  [] Macrovascular complications (CAD/CVA/PAD)  [] Neuropathy  [] Retinopathy  [] Nephropathy  [] HTN  [] Hyperlipidemia  [] Stroke/TIA  [] Gastroparesis      Continuous Glucose Monitoring Interpretation  Behrooz Fallahi has undergone continuous glucose monitoring with the Dexcom G7 CGM with reader.  The blood glucose tracings were evaluated for two weeks prior to office visit. Blood glucose tracings demonstrated areas of hyperglycemia throughout the day particularly after meals . There were no areas of hypoglycemia noted during the weeks of evaluation.      Very high 20 %  High 54 %  In range 26 %  Low 0%  Very low 0%        Previously trialed/failed DM meds: sulfonylureas (discontinued to hypoglycemia), repaglinide (ineffective)    History/Other:    Allergies, PMH, SocHx and FHx reviewed and updated as appropriate in Epic on    METFORMIN HCL 1000 MG Oral Tab TAKE 1 TABLET BY MOUTH TWICE A DAY WITH MEALS 180 tablet 1    Repaglinide 1 MG Oral Tab Take 1 tablet (1 mg total) by mouth 3 (three) times daily before meals. 90 tablet 1    dapagliflozin (FARXIGA) 10 MG Oral Tab Take 1 tablet (10 mg total) by mouth every morning. 90 tablet 1    Continuous Glucose Sensor (DEXCOM G7 SENSOR) Does not apply Misc 1 each Every 10 days. 9 each 3    lisinopril 20 MG Oral Tab Take 1 tablet (20 mg total) by mouth daily. 90 tablet 3    atorvastatin 20 MG Oral Tab Take 1 tablet (20 mg total) by mouth nightly. 90 tablet 3    Continuous Blood Gluc  (DEXCOM G7 ) Does not apply Device USE 1 DEVICE AS DIRECTED      lactulose 10 GM/15ML Oral Solution Take 15 mL (10 g total) by mouth 2 (two) times daily.      cyanocobalamin 1000 MCG/ML Injection Solution       MEMANTINE HCL 10 MG Oral Tab TAKE 1 TABLET BY MOUTH TWICE A DAY 60 tablet 0    Calcium Carbonate Antacid 600 MG Oral Chew Tab Chew 600 mg by mouth as needed.        Coenzyme Q10 (COQ10)  400 MG Oral Cap Take by mouth.      Hzcmjobcn-Bqybcqxqy-Romgyhtwbx (CEREFOLIN NAC) 6-2-600 MG Oral Tab Take 1 tablet by mouth daily.      Omega-3 Fatty Acids (FISH OIL) 600 MG Oral Cap Take  by mouth.      Tirzepatide (MOUNJARO) 5 MG/0.5ML Subcutaneous Solution Auto-injector Inject 5 mg into the skin every 7 days. 2 mL 0     Allergies   Allergen Reactions    Other RASH and ITCHING     UV rays/Sunlight     Current Outpatient Medications   Medication Sig Dispense Refill    METFORMIN HCL 1000 MG Oral Tab TAKE 1 TABLET BY MOUTH TWICE A DAY WITH MEALS 180 tablet 1    Repaglinide 1 MG Oral Tab Take 1 tablet (1 mg total) by mouth 3 (three) times daily before meals. 90 tablet 1    dapagliflozin (FARXIGA) 10 MG Oral Tab Take 1 tablet (10 mg total) by mouth every morning. 90 tablet 1    Continuous Glucose Sensor (DEXCOM G7 SENSOR) Does not apply Misc 1 each Every 10 days. 9 each 3    lisinopril 20 MG Oral Tab Take 1 tablet (20 mg total) by mouth daily. 90 tablet 3    atorvastatin 20 MG Oral Tab Take 1 tablet (20 mg total) by mouth nightly. 90 tablet 3    Continuous Blood Gluc  (DEXCOM G7 ) Does not apply Device USE 1 DEVICE AS DIRECTED      lactulose 10 GM/15ML Oral Solution Take 15 mL (10 g total) by mouth 2 (two) times daily.      cyanocobalamin 1000 MCG/ML Injection Solution       MEMANTINE HCL 10 MG Oral Tab TAKE 1 TABLET BY MOUTH TWICE A DAY 60 tablet 0    Calcium Carbonate Antacid 600 MG Oral Chew Tab Chew 600 mg by mouth as needed.        Coenzyme Q10 (COQ10) 400 MG Oral Cap Take by mouth.      Ququwaltr-Fhdykihft-Xcjsqdsodq (CEREFOLIN NAC) 6-2-600 MG Oral Tab Take 1 tablet by mouth daily.      Omega-3 Fatty Acids (FISH OIL) 600 MG Oral Cap Take  by mouth.      Tirzepatide (MOUNJARO) 5 MG/0.5ML Subcutaneous Solution Auto-injector Inject 5 mg into the skin every 7 days. 2 mL 0     Past Medical History:    Essential hypertension    Hematuria    Hyperlipidemia    Kidney stones    Obstructive sleep apnea  (adult) (pediatric)    dx 2015 at [facility]  - AHI 6 autoPAP 6-16 Sleep RX     Type II or unspecified type diabetes mellitus without mention of complication, not stated as uncontrolled     Family History   Problem Relation Age of Onset    Cancer Father         prostate     Diabetes Mother     Diabetes Sister     Diabetes Sister      Social history: Reviewed.      ROS/Exam    REVIEW OF SYSTEMS: Ten point review of systems has been performed and is otherwise negative and/or non-contributory, except as described above.     VITALS  Vitals:    02/21/25 0929   BP: 120/80   Pulse: 85   Resp: 18   SpO2: 99%   Weight: 185 lb (83.9 kg)   Height: 5' 9\" (1.753 m)         Wt Readings from Last 6 Encounters:   02/21/25 185 lb (83.9 kg)   12/17/24 183 lb 3.2 oz (83.1 kg)   11/15/24 181 lb 9.6 oz (82.4 kg)   08/05/24 180 lb (81.6 kg)   07/02/24 180 lb (81.6 kg)   05/03/24 179 lb (81.2 kg)       PHYSICAL EXAM  CONSTITUTIONAL:  awake, alert, cooperative, no apparent distress, and appears stated age   PSYCH: normal affect  LUNGS: breathing comfortably  CARDIOVASCULAR:  regular rate   NECK:  no palpable thyroid nodules     Labs/Imaging: Pertinent imaging reviewed.    Overall glucose control:  Lab Results   Component Value Date    A1C 7.7 (A) 02/21/2025    A1C 7.6 (A) 11/15/2024    A1C 7.9 (A) 08/05/2024    A1C 7.3 (A) 05/03/2024    A1C 7.1 (A) 10/31/2023       Supplementary Documentation:   -Surveillance for Diabetes Complications & Risks  Foot exam/neuropathy: Last Foot Exam: 07/02/24    Retinopathy screening: Last Dilated Eye Exam: 06/05/24  Eye Exam shows Diabetic Retinopathy?: No      Assessment & Plan:     ICD-10-CM    1. Type 2 diabetes mellitus with hyperglycemia, without long-term current use of insulin (HCC)  E11.65 POC Hemoglobin A1C     Tirzepatide (MOUNJARO) 5 MG/0.5ML Subcutaneous Solution Auto-injector            Behrooz Fallahi is a pleasant 74 year old male here for evaluation of:    #Diabetes- PMHx of Type 2 diabetes  mellitus.     Last A1c value was 7.7% done 2/21/2025. GMI 8.4%.  -Current meds: Ozempic 2mg/week, metformin 1000mg BID, Farxiga 10mg daily  -Previous: sulfonylureas (hypoglycemia), repaglinide (ineffective)  -Reviewed multifactorial nature of successful glycemic control involving pharmacotherapy, diet and carbohydrate management, and optimization of physical activity. Reviewed goals of therapy with DM and rationale of A1C/glycemic goals in prevention of development/progression of organ damage and diabetes complications.  -Worsening A1c/GMI over the past several visits despite reported adherence to medications, patient feels this is due to increased sugar cravings and snacking that he is having trouble resisting  -Reviewed that avoidance of hypoglycemia is key in his age group  -JOSIE antibodies/labs negative  -Reviewed that it is very difficult to control high carb diets with medication and that if he is unable to reduce his snacking/carb intake, insulin therapy is advised given trend of sugars and notable uptrend in GMI  -Patient is interested in trying to work on reducing his sugar cravings/carb intake, reviewed that he could trial Mounjaro as this can occasionally be more effective in reducing hunger cravings and help with weight management.  Patient agreeable, will submit for Mounjaro 5 mg weekly and uptitrate as appropriate.  Discontinue Ozempic when starting Mounjaro.  -If next A1c/BMI is not improved, will suggest patient introduce basal insulin.  -Continue metformin and Farxiga, discontinue repaglinide due to lack of efficacy  -Pt to reintroduce exercise/walking    -See above header \"Supplementary Documentation\" for surveillance for diabetes complications & risks    #Nephropathy screening/CKD:   Lab Results   Component Value Date    EGFRCR 91 08/06/2024    MICROALBCREA 23.5 03/13/2023      #Blood pressure control: SBP is not to goal <130   BP Readings from Last 1 Encounters:   02/21/25 120/80   BP Meds:  lisinopril Tabs - 20 MG     #Hyperlipidemia/Lipids: LDL is to goal   Lab Results   Component Value Date    LDL 77 08/06/2024    TRIG 167 (H) 08/06/2024   Cholesterol Meds: atorvastatin Tabs - 20 MG      The above plan was discussed in detail with the patient who verbalized understanding and agreement.      A total of 40 minutes was spent today on obtaining history, reviewing outside records, reviewing pertinent labs/imaging, reviewing relevant pathophysiology with patient, evaluating patient, providing multiple treatment options, communicating with patient's other providers as appropriate, and completing documentation and orders.      Amalia Olguin DO  [facility] Endocrinology  2/21/2025     Note to patient: The 21 Century Cures Act makes medical notes like these available to patients in the interest of transparency. However, be advised this is a medical document. It is intended as peer to peer communication. It is written in medical language and may contain abbreviations or verbiage that are unfamiliar. It may appear blunt or direct. Medical documents are intended to carry relevant information, facts as evident, and the clinical opinion of the practitioner.      In reviewing this note, please be advised that Dragon Voice Recognition software used to dictate the note may have made errors in recognizing some of the words or phrases.

## 2025-02-25 ENCOUNTER — TELEPHONE (OUTPATIENT)
Facility: CLINIC | Age: 75
End: 2025-02-25

## 2025-03-22 DIAGNOSIS — E11.65 TYPE 2 DIABETES MELLITUS WITH HYPERGLYCEMIA, WITHOUT LONG-TERM CURRENT USE OF INSULIN (HCC): ICD-10-CM

## 2025-03-24 RX ORDER — TIRZEPATIDE 5 MG/.5ML
5 INJECTION, SOLUTION SUBCUTANEOUS
Qty: 2 ML | Refills: 0 | Status: SHIPPED | OUTPATIENT
Start: 2025-03-24 | End: 2025-04-23

## 2025-03-24 NOTE — TELEPHONE ENCOUNTER
Endocrine Refill protocol for oral and injectable diabetic medications    Protocol Criteria:  PASSED  Reason: N/A    If all below requirements are met, send a 90-day supply with 1 refill per provider protocol.    Verify appointment with Endocrinology completed in the last 6 months or scheduled in the next 3 months.  Verify A1C has been completed within the last 6 months and is below 8.5%     Last completed office visit: 2/21/2025 Amalia Olguin DO   Next scheduled Follow up:   Future Appointments   Date Time Provider Department Center   4/23/2025 11:30 AM Amalia Olguin DO [id number] [facility] [place]   5/5/2025  5:00 PM Armando Antunez MD [facility]      Last A1c result: Last A1c value was 7.7% done 2/21/2025.

## 2025-03-26 DIAGNOSIS — E78.49 OTHER HYPERLIPIDEMIA: ICD-10-CM

## 2025-03-31 RX ORDER — ATORVASTATIN CALCIUM 20 MG/1
20 TABLET, FILM COATED ORAL NIGHTLY
Qty: 90 TABLET | Refills: 3 | Status: SHIPPED | OUTPATIENT
Start: 2025-03-31

## 2025-03-31 NOTE — TELEPHONE ENCOUNTER
Please review.  Protocol failed / Has no protocol.    JumpStart message sent to patient to complete labs pended from last office visit 12/17/24.      Requested Prescriptions   Pending Prescriptions Disp Refills    ATORVASTATIN 20 MG Oral Tab [Pharmacy Med Name: ATORVASTATIN 20 MG TABLET] 90 tablet 3     Sig: TAKE 1 TABLET BY MOUTH EVERY DAY AT NIGHT       Cholesterol Medication Protocol Failed - 3/31/2025  9:42 AM        Failed - ALT < 80     Lab Results   Component Value Date    ALT 19 12/14/2023           Failed - ALT resulted within past year        Passed - Lipid panel within past 12 months        Passed - In person appointment or virtual visit in the past 12 mos or appointment in next 3 mos        Passed - Medication is active on med list

## 2025-04-01 DIAGNOSIS — E11.65 TYPE 2 DIABETES MELLITUS WITH HYPERGLYCEMIA, WITHOUT LONG-TERM CURRENT USE OF INSULIN (HCC): ICD-10-CM

## 2025-04-01 RX ORDER — ACYCLOVIR 400 MG/1
1 TABLET ORAL
Qty: 9 EACH | Refills: 1 | Status: SHIPPED | OUTPATIENT
Start: 2025-04-01

## 2025-04-01 NOTE — TELEPHONE ENCOUNTER
Endocrine Refill protocol for CGM supplies     Protocol Criteria:  PASSED Reason: N/A    If below requirement is met, send a 90-day supply with 1 refill per provider protocol.     Verify appointment with Endocrinology completed in the last 12 months or scheduled in the next 6 months     Last completed office visit:2/21/2025 Amalia Olguin DO   Next scheduled Follow up:   Future Appointments   Date Time Provider Department Center   4/23/2025 11:30 AM Amalia Olguin DO [id number] [facility] [place]   5/5/2025  5:00 PM Armando Antunez MD [facility]        Sent to pharmacy per protocol.

## 2025-04-15 ENCOUNTER — PATIENT MESSAGE (OUTPATIENT)
Facility: CLINIC | Age: 75
End: 2025-04-15

## 2025-04-15 DIAGNOSIS — E11.65 TYPE 2 DIABETES MELLITUS WITH HYPERGLYCEMIA, WITHOUT LONG-TERM CURRENT USE OF INSULIN (HCC): ICD-10-CM

## 2025-04-16 RX ORDER — DAPAGLIFLOZIN 10 MG/1
10 TABLET, FILM COATED ORAL EVERY MORNING
Qty: 90 TABLET | Refills: 1 | Status: SHIPPED | OUTPATIENT
Start: 2025-04-16

## 2025-04-16 NOTE — TELEPHONE ENCOUNTER
Endocrine Refill protocol for Farxiga    Protocol Criteria:  PASSED  Reason: N/A    If all below requirements are met, send a 90-day supply with 1 refill per provider protocol.     Verify appointment with Endocrinology completed in the last 6 months or scheduled in the next 3 months.  Verify A1C has been completed within the last 6 months and is below 8.5%  Verify last GFR is greater than or equal to 40 in the past 12 months    Last completed office visit:2/21/2025 Amalia Olguin DO   Next scheduled Follow up:   Future Appointments   Date Time Provider Department Center   4/23/2025 11:30 AM Amalia Olguin DO [id number] [facility] [place]   5/5/2025  5:00 PM Armando Antunez MD [facility]       Last GFR result:    Lab Results   Component Value Date    EGFRCR 91 08/06/2024     Last A1c result: Last A1C result: 7.7% done 2/21/2025.

## 2025-04-23 ENCOUNTER — OFFICE VISIT (OUTPATIENT)
Facility: CLINIC | Age: 75
End: 2025-04-23
Payer: MEDICARE

## 2025-04-23 VITALS
HEIGHT: 69 IN | DIASTOLIC BLOOD PRESSURE: 60 MMHG | SYSTOLIC BLOOD PRESSURE: 118 MMHG | WEIGHT: 185 LBS | RESPIRATION RATE: 18 BRPM | OXYGEN SATURATION: 99 % | HEART RATE: 70 BPM | BODY MASS INDEX: 27.4 KG/M2

## 2025-04-23 DIAGNOSIS — E11.65 TYPE 2 DIABETES MELLITUS WITH HYPERGLYCEMIA, WITHOUT LONG-TERM CURRENT USE OF INSULIN (HCC): Primary | ICD-10-CM

## 2025-04-23 PROCEDURE — 99214 OFFICE O/P EST MOD 30 MIN: CPT | Performed by: STUDENT IN AN ORGANIZED HEALTH CARE EDUCATION/TRAINING PROGRAM

## 2025-04-23 PROCEDURE — 95249 CONT GLUC MNTR PT PROV EQP: CPT | Performed by: STUDENT IN AN ORGANIZED HEALTH CARE EDUCATION/TRAINING PROGRAM

## 2025-04-23 RX ORDER — TIRZEPATIDE 7.5 MG/.5ML
7.5 INJECTION, SOLUTION SUBCUTANEOUS WEEKLY
Qty: 2 ML | Refills: 0 | Status: SHIPPED | OUTPATIENT
Start: 2025-04-23

## 2025-04-23 RX ORDER — DAPAGLIFLOZIN 10 MG/1
10 TABLET, FILM COATED ORAL EVERY MORNING
Qty: 90 TABLET | Refills: 1 | Status: SHIPPED | OUTPATIENT
Start: 2025-04-23

## 2025-04-23 NOTE — PROGRESS NOTES
[facility] Note    Name: Behrooz Fallahi    Date: 4/23//25    Behrooz Fallahi is a 74 year old male who presents for evaluation of T2DM management.     Chief complaint: Diabetes (Pt here for diabetes f/u, pt has dexcom, pt feels that the mounjaro didn't help with reducing appetite./Last A1C-02/21/2025 7.7/Last Foot exam- 07/02/2024/Last Eye exam- 06/05/2024)       Subjective:   Initial HPI consult in May 2024    Interval History 8/5/24:  -Notes sugars have been notably higher the past several months despite strict adherence to medication and diet, no steroid administration or new medications  -No weight loss, jaundice, etc  -Walking 35-40 min every morning  -Dinner is largest meal and often contains rice    Interval History 11/15/24:  -Labs 8/6/24: , C-peptide measurable 3.2, LEIGH 65 negative, ZNT8 negative, pancreatic islet cell negative  -Not exercising much  -Predictable mealtime spikes with lunch, late afternoon, and dinner  -Feeling like his mental processing and focus is a little slower these days    Interval History 2/21/25:  -A1c 7.7% today  -Reports adherence to Ozempic 2 mg weekly (occasionally taking 10 days apart), Farxiga 10 mg daily, metformin 1000 mg twice daily, repaglinide 1 to 2 mg 3 times daily before meals (occasionally taking at the start of meal)  -On review of CGM as below, generally elevated sugars throughout the day with GMI of 8.4%  -Patient reports he struggling to avoid snacking and is eating a lot of high carb foods such as karina bread, dates, eating multiple fruits at 1 time, etc.  Struggling with these cravings and feels this is causing the spike in his sugar.  -Less motivated to exercise though he is taking daily walks with his wife    Interval history 4/23/25:  -Has not worn CGM for several weeks, last data from end of March  -Has been taking Mounjaro 5mg daily, feels sugars are not as well controlled as with Ozempic and has not noticed any appetite  suppression  -Adherent to metformin/Farxiga  -Not checking fingersticks    DM hx:     -Re: potential DM medication contraindication (if positive, checkbox selected):  [] History of pancreatitis  [] Personal/fam hx of medullary thyroid cancer/MEN2  [] History of recent/frequent UTI/yeast infxn  [] Previous amputation related to diabetes    -Presence of associated DM complications (if positive, checkbox selected):  [] Macrovascular complications (CAD/CVA/PAD)  [] Neuropathy  [] Retinopathy  [] Nephropathy  [] HTN  [] Hyperlipidemia  [] Stroke/TIA  [] Gastroparesis      Continuous Glucose Monitoring Interpretation  Behrooz Fallahi has undergone continuous glucose monitoring with the Dexcom G7 CGM with reader.  The blood glucose tracings were evaluated for two weeks prior to office visit. Blood glucose tracings demonstrated areas of hyperglycemia throughout the day particularly after meals . There were no areas of hypoglycemia noted during the weeks of evaluation.                Previously trialed/failed DM meds: sulfonylureas (discontinued to hypoglycemia), repaglinide (ineffective), Ozempic (holding while trying Mounjaro)    History/Other:    Allergies, PMH, SocHx and FHx reviewed and updated as appropriate in Epic on    Tirzepatide (MOUNJARO) 7.5 MG/0.5ML Subcutaneous Solution Auto-injector Inject 7.5 mg into the skin once a week. 2 mL 0    dapagliflozin (FARXIGA) 10 MG Oral Tab Take 1 tablet (10 mg total) by mouth every morning. 90 tablet 1    metFORMIN HCl 1000 MG Oral Tab Take 1 tablet (1,000 mg total) by mouth 2 (two) times daily with meals. 180 tablet 1    Continuous Glucose Sensor (DEXCOM G7 SENSOR) Does not apply Misc 1 each Every 10 days. 9 each 1    atorvastatin 20 MG Oral Tab Take 1 tablet (20 mg total) by mouth nightly. 90 tablet 3    lisinopril 20 MG Oral Tab Take 1 tablet (20 mg total) by mouth daily. 90 tablet 3    Continuous Blood Gluc  (DEXCOM G7 ) Does not apply Device USE 1 DEVICE AS  DIRECTED      lactulose 10 GM/15ML Oral Solution Take 15 mL (10 g total) by mouth 2 (two) times daily.      cyanocobalamin 1000 MCG/ML Injection Solution       MEMANTINE HCL 10 MG Oral Tab TAKE 1 TABLET BY MOUTH TWICE A DAY 60 tablet 0    Calcium Carbonate Antacid 600 MG Oral Chew Tab Chew 600 mg by mouth as needed.        Coenzyme Q10 (COQ10) 400 MG Oral Cap Take by mouth.      Pohryucdz-Vnrzgoisj-Xonaefisva (CEREFOLIN NAC) 6-2-600 MG Oral Tab Take 1 tablet by mouth daily.      Omega-3 Fatty Acids (FISH OIL) 600 MG Oral Cap Take  by mouth.       Allergies   Allergen Reactions    Other RASH and ITCHING     UV rays/Sunlight     Current Outpatient Medications   Medication Sig Dispense Refill    Tirzepatide (MOUNJARO) 7.5 MG/0.5ML Subcutaneous Solution Auto-injector Inject 7.5 mg into the skin once a week. 2 mL 0    dapagliflozin (FARXIGA) 10 MG Oral Tab Take 1 tablet (10 mg total) by mouth every morning. 90 tablet 1    metFORMIN HCl 1000 MG Oral Tab Take 1 tablet (1,000 mg total) by mouth 2 (two) times daily with meals. 180 tablet 1    Continuous Glucose Sensor (DEXCOM G7 SENSOR) Does not apply Misc 1 each Every 10 days. 9 each 1    atorvastatin 20 MG Oral Tab Take 1 tablet (20 mg total) by mouth nightly. 90 tablet 3    lisinopril 20 MG Oral Tab Take 1 tablet (20 mg total) by mouth daily. 90 tablet 3    Continuous Blood Gluc  (DEXCOM G7 ) Does not apply Device USE 1 DEVICE AS DIRECTED      lactulose 10 GM/15ML Oral Solution Take 15 mL (10 g total) by mouth 2 (two) times daily.      cyanocobalamin 1000 MCG/ML Injection Solution       MEMANTINE HCL 10 MG Oral Tab TAKE 1 TABLET BY MOUTH TWICE A DAY 60 tablet 0    Calcium Carbonate Antacid 600 MG Oral Chew Tab Chew 600 mg by mouth as needed.        Coenzyme Q10 (COQ10) 400 MG Oral Cap Take by mouth.      Wrqmvfydj-Ewqfizbzl-Wzllvtucsg (CEREFOLIN NAC) 6-2-600 MG Oral Tab Take 1 tablet by mouth daily.      Omega-3 Fatty Acids (FISH OIL) 600 MG Oral Cap Take   by mouth.       Past Medical History:    Essential hypertension    Hematuria    Hyperlipidemia    Kidney stones    Obstructive sleep apnea (adult) (pediatric)    dx 2015 at [facility]  - AHI 6 autoPAP 6-16 Sleep RX     Type II or unspecified type diabetes mellitus without mention of complication, not stated as uncontrolled     Family History   Problem Relation Age of Onset    Cancer Father         prostate     Diabetes Mother     Diabetes Sister     Diabetes Sister      Social history: Reviewed.      ROS/Exam    REVIEW OF SYSTEMS: Ten point review of systems has been performed and is otherwise negative and/or non-contributory, except as described above.     VITALS  Vitals:    04/23/25 1144   BP: 118/60   Pulse: 70   Resp: 18   SpO2: 99%   Weight: 185 lb (83.9 kg)   Height: 5' 9\" (1.753 m)           Wt Readings from Last 6 Encounters:   04/23/25 185 lb (83.9 kg)   02/21/25 185 lb (83.9 kg)   12/17/24 183 lb 3.2 oz (83.1 kg)   11/15/24 181 lb 9.6 oz (82.4 kg)   08/05/24 180 lb (81.6 kg)   07/02/24 180 lb (81.6 kg)       PHYSICAL EXAM  CONSTITUTIONAL:  awake, alert, cooperative, no apparent distress, and appears stated age   PSYCH: normal affect  LUNGS: breathing comfortably  CARDIOVASCULAR:  regular rate   NECK:  no palpable thyroid nodules     Labs/Imaging: Pertinent imaging reviewed.    Overall glucose control:  Lab Results   Component Value Date    A1C 7.7 (A) 02/21/2025    A1C 7.6 (A) 11/15/2024    A1C 7.9 (A) 08/05/2024    A1C 7.3 (A) 05/03/2024    A1C 7.1 (A) 10/31/2023       Supplementary Documentation:   -Surveillance for Diabetes Complications & Risks  Foot exam/neuropathy: Last Foot Exam: 07/02/24    Retinopathy screening: Last Dilated Eye Exam: 06/05/24  Eye Exam shows Diabetic Retinopathy?: No      Assessment & Plan:     ICD-10-CM    1. Type 2 diabetes mellitus with hyperglycemia, without long-term current use of insulin ([facility])  E11.65 Tirzepatide (MOUNJARO) 7.5 MG/0.5ML Subcutaneous Solution Auto-injector      dapagliflozin (FARXIGA) 10 MG Oral Tab     metFORMIN HCl 1000 MG Oral Tab              Behrooz Fallahi is a pleasant 74 year old male here for evaluation of:    #Diabetes- PMHx of Type 2 diabetes mellitus.     Last A1c value was 7.7% done 2/21/2025. GMI 8.4%.  -Current meds: Mounjaro 5mg weekly, metformin 1000mg BID, Farxiga 10mg daily  -Previous: sulfonylureas (hypoglycemia), repaglinide (ineffective)  -Reviewed multifactorial nature of successful glycemic control involving pharmacotherapy, diet and carbohydrate management, and optimization of physical activity. Reviewed goals of therapy with DM and rationale of A1C/glycemic goals in prevention of development/progression of organ damage and diabetes complications.  -Worsening A1c/GMI over the past several visits despite reported adherence to medications, patient feels this is due to increased sugar cravings and snacking that he is having trouble resisting  -Reviewed that avoidance of hypoglycemia is key in his age group  -JOSIE antibodies/labs negative  -Reviewed that it is very difficult to control high carb diets with medication and that if he is unable to reduce his snacking/carb intake, insulin therapy is advised given trend of sugars and notable uptrend in GMI  -Increase Mounjaro to 7.5 mg weekly, patient reports no adverse effects but feels this is not yet effective in controlling his appetite.  Will uptitrate as tolerated but if patient continues to notice no effect can revert to Ozempic.    -Will need to consider introduction of basal insulin in the next several months if A1c remains uncontrolled  -Asked patient to resume CGM, will send information regarding skin irritation   -Advised patient to schedule eye exam for summer  -Screening labs due around August 2025  -Continue metformin and Farxiga, discontinue repaglinide due to lack of efficacy  -Pt to reintroduce exercise/walking    -See above header \"Supplementary Documentation\" for surveillance for  diabetes complications & risks    #Nephropathy screening/CKD:   Lab Results   Component Value Date    EGFRCR 91 08/06/2024    MICROALBCREA 23.5 03/13/2023      #Blood pressure control: SBP is not to goal <130   BP Readings from Last 1 Encounters:   04/23/25 118/60   BP Meds: lisinopril Tabs - 20 MG     #Hyperlipidemia/Lipids: LDL is to goal   Lab Results   Component Value Date    LDL 77 08/06/2024    TRIG 167 (H) 08/06/2024   Cholesterol Meds: atorvastatin Tabs - 20 MG      The above plan was discussed in detail with the patient who verbalized understanding and agreement.      Amalia Olguin DO  [facility]  4/23/25     Note to patient: The 21 Century Cures Act makes medical notes like these available to patients in the interest of transparency. However, be advised this is a medical document. It is intended as peer to peer communication. It is written in medical language and may contain abbreviations or verbiage that are unfamiliar. It may appear blunt or direct. Medical documents are intended to carry relevant information, facts as evident, and the clinical opinion of the practitioner.      In reviewing this note, please be advised that Dragon Voice Recognition software used to dictate the note may have made errors in recognizing some of the words or phrases.

## 2025-04-23 NOTE — PATIENT INSTRUCTIONS
Summary of today's visit:  We refilled the Farxiga and metformin.   Let's try the next dose up for Mounjaro which is 7.5mg to see if that has any better effect on the appetite. Please send me a message after you use the 3rd or 4th pen to let me know how it's going. You will start next Monday the 30th.  Please resume your Dexcom sensor.  Please schedule your diabetic eye exam prior to your follow up visit and have your doctor fax the report to our office.  Please complete your labs prior to your follow up visit.    General follow up information:  Please let us know if you require any refills at least 1 week prior to your medication running out. If you do run out of medication, please call our office ASAP to request refills (do not wait until your follow up).   Please call our office if sugars at home are consistently greater than 250 or less than 70 for medication adjustment (do not wait until your follow up appointment).  Lab results and imaging will typically be reviewed at follow up appointments, or within 3-5 business days of ALL results being in if you do not have an appointment scheduled in the near future. Our office will contact you for any abnormal results requiring more urgent follow up or action.   The on-call pager is for urgent matters only. If you are a type 1 diabetic and run out of insulin after business hours 8AM-4PM, you may call the on-call pager for a refill to a 24 hour pharmacy. All other refill requests should be requested during business hours.    Return Visit:  [] Dr. Olguin in June/July 2025      HOW TO TREAT LOW BLOOD SUGAR (Hypoglycemia)  Low blood sugar = Less than 70, or if you start to have symptoms  Symptoms: Shaking or trembling, fast heart rate, extreme hunger, sweating, confusion/difficulty concentrating, dizziness    How to treat a low blood sugar if you are able to eat/drink: The Rule of 15/15  If you are using a continuous glucose monitor that says you are low, but you do not have  any symptoms, verify on fingerstick that your blood sugar is actually <70 before treating.   Eat 15 grams of carbs (see examples below)  Check your blood sugar after 15 minutes. If it’s still below your target range, have another serving.   Repeat these steps until sugar is >90. Once it’s in range, if you're nervous about your sugar going low again, have a protein source (ie, a spoonful of peanut butter).   If you have a CGM, you want to look for how your arrow has changed. If you arrow is pointed up or sideways after 15 min, give your CGM more time OR check with a finger stick. Try not to eat more food until at least 15 min after the first BG check - otherwise you risk spiking your sugar too high.  If you are experiencing symptoms and you are unable to check your blood glucose for any reason, treat the hypoglycemia.  If someone has a low blood sugar and is unconscious: Don’t hesitate to call 911. Use emergency glucagon. If someone is unconscious and glucagon is not available or someone does not know how to use it, call 911 immediately.     To treat a low, carry with you easy, pre-portioned treatment for low blood sugars that are 15G of carbs:   - Children sized squeeze pouch applesauce (high fiber + carbs help prevent too high of a spike)  - Small children's sized juicebox- 15g carb --> 4oz juice box  - Glucose tablets from [employer], you can find them near diabetes supplies --> Note, you will need to eat 3-4 tablets to get to 15g of carbs  - Child sized fruit snack pack- look for one with 15 grams of total carbohydrate  - Choice of how to treat your low is important. Complex carbs, or foods that contain fats along with carbs (like chocolate) can slow the absorption of glucose and should NOT be used to treat an emergency low.

## 2025-05-03 ENCOUNTER — LAB ENCOUNTER (OUTPATIENT)
Dept: LAB | Facility: HOSPITAL | Age: 75
End: 2025-05-03
Attending: INTERNAL MEDICINE
Payer: MEDICARE

## 2025-05-03 DIAGNOSIS — Z12.5 PROSTATE CANCER SCREENING: ICD-10-CM

## 2025-05-03 DIAGNOSIS — E11.9 TYPE 2 DIABETES MELLITUS WITHOUT COMPLICATION, WITHOUT LONG-TERM CURRENT USE OF INSULIN (HCC): ICD-10-CM

## 2025-05-03 LAB
ALBUMIN SERPL-MCNC: 4.7 G/DL (ref 3.2–4.8)
ALBUMIN/GLOB SERPL: 1.7 {RATIO} (ref 1–2)
ALP LIVER SERPL-CCNC: 20 U/L (ref 45–117)
ALT SERPL-CCNC: 23 U/L (ref 10–49)
ANION GAP SERPL CALC-SCNC: 9 MMOL/L (ref 0–18)
AST SERPL-CCNC: 13 U/L (ref ?–34)
BASOPHILS # BLD AUTO: 0.06 X10(3) UL (ref 0–0.2)
BASOPHILS NFR BLD AUTO: 0.9 %
BILIRUB SERPL-MCNC: 0.6 MG/DL (ref 0.2–1.1)
BUN BLD-MCNC: 13 MG/DL (ref 9–23)
CALCIUM BLD-MCNC: 9.1 MG/DL (ref 8.7–10.6)
CHLORIDE SERPL-SCNC: 103 MMOL/L (ref 98–112)
CO2 SERPL-SCNC: 28 MMOL/L (ref 21–32)
COMPLEXED PSA SERPL-MCNC: 1.54 NG/ML (ref ?–4)
CREAT BLD-MCNC: 0.88 MG/DL (ref 0.7–1.3)
EGFRCR SERPLBLD CKD-EPI 2021: 90 ML/MIN/1.73M2 (ref 60–?)
EOSINOPHIL # BLD AUTO: 0.17 X10(3) UL (ref 0–0.7)
EOSINOPHIL NFR BLD AUTO: 2.5 %
ERYTHROCYTE [DISTWIDTH] IN BLOOD BY AUTOMATED COUNT: 12.7 %
FASTING STATUS PATIENT QL REPORTED: YES
GLOBULIN PLAS-MCNC: 2.8 G/DL (ref 2–3.5)
GLUCOSE BLD-MCNC: 180 MG/DL (ref 70–99)
HCT VFR BLD AUTO: 45.4 % (ref 39–53)
HGB BLD-MCNC: 15.1 G/DL (ref 13–17.5)
IMM GRANULOCYTES # BLD AUTO: 0.02 X10(3) UL (ref 0–1)
IMM GRANULOCYTES NFR BLD: 0.3 %
LYMPHOCYTES # BLD AUTO: 2.37 X10(3) UL (ref 1–4)
LYMPHOCYTES NFR BLD AUTO: 35.1 %
MCH RBC QN AUTO: 29.9 PG (ref 26–34)
MCHC RBC AUTO-ENTMCNC: 33.3 G/DL (ref 31–37)
MCV RBC AUTO: 89.9 FL (ref 80–100)
MONOCYTES # BLD AUTO: 0.65 X10(3) UL (ref 0.1–1)
MONOCYTES NFR BLD AUTO: 9.6 %
NEUTROPHILS # BLD AUTO: 3.48 X10 (3) UL (ref 1.5–7.7)
NEUTROPHILS # BLD AUTO: 3.48 X10(3) UL (ref 1.5–7.7)
NEUTROPHILS NFR BLD AUTO: 51.6 %
OSMOLALITY SERPL CALC.SUM OF ELEC: 295 MOSM/KG (ref 275–295)
PLATELET # BLD AUTO: 258 10(3)UL (ref 150–450)
POTASSIUM SERPL-SCNC: 4.1 MMOL/L (ref 3.5–5.1)
PROT SERPL-MCNC: 7.5 G/DL (ref 5.7–8.2)
RBC # BLD AUTO: 5.05 X10(6)UL (ref 3.8–5.8)
SODIUM SERPL-SCNC: 140 MMOL/L (ref 136–145)
WBC # BLD AUTO: 6.8 X10(3) UL (ref 4–11)

## 2025-05-03 PROCEDURE — 36415 COLL VENOUS BLD VENIPUNCTURE: CPT

## 2025-05-03 PROCEDURE — 85025 COMPLETE CBC W/AUTO DIFF WBC: CPT

## 2025-05-03 PROCEDURE — 80053 COMPREHEN METABOLIC PANEL: CPT

## 2025-05-05 ENCOUNTER — OFFICE VISIT (OUTPATIENT)
Dept: INTERNAL MEDICINE CLINIC | Facility: CLINIC | Age: 75
End: 2025-05-05
Payer: MEDICARE

## 2025-05-05 VITALS
HEART RATE: 98 BPM | OXYGEN SATURATION: 96 % | TEMPERATURE: 97 F | DIASTOLIC BLOOD PRESSURE: 78 MMHG | BODY MASS INDEX: 27.7 KG/M2 | HEIGHT: 69 IN | WEIGHT: 187 LBS | RESPIRATION RATE: 16 BRPM | SYSTOLIC BLOOD PRESSURE: 132 MMHG

## 2025-05-05 DIAGNOSIS — Z12.11 SPECIAL SCREENING FOR MALIGNANT NEOPLASMS, COLON: Primary | ICD-10-CM

## 2025-05-05 DIAGNOSIS — F03.A0 MILD DEMENTIA WITHOUT BEHAVIORAL DISTURBANCE, PSYCHOTIC DISTURBANCE, MOOD DISTURBANCE, OR ANXIETY, UNSPECIFIED DEMENTIA TYPE (HCC): ICD-10-CM

## 2025-05-05 DIAGNOSIS — I10 ESSENTIAL HYPERTENSION: ICD-10-CM

## 2025-05-05 PROCEDURE — 99214 OFFICE O/P EST MOD 30 MIN: CPT | Performed by: INTERNAL MEDICINE

## 2025-05-05 NOTE — PROGRESS NOTES
Subjective:   Patient ID: Behrooz Fallahi is a 75 year old male.    /78   Pulse 98   Temp 97.4 °F (36.3 °C) (Temporal)   Resp 16   Ht 5' 9\" (1.753 m)   Wt 187 lb (84.8 kg)   SpO2 96%   BMI 27.62 kg/m²     HPI  Here for fu on htn, care gap for foot exam, done, see bleow, well overdue for cscope, we had discussed at his last ov, he said planned to do, has not done, not scheduled, again discussed importance  History/Other:   Review of Systems   Constitutional:  Negative for fever.   Eyes:  Negative for visual disturbance.   Respiratory:  Negative for shortness of breath.    Cardiovascular:  Negative for chest pain.   Endocrine: Negative for polyuria.   Neurological:  Negative for headaches.     Current Medications[1]  Allergies:Allergies[2]    Objective:   Physical Exam  Constitutional:       Appearance: Normal appearance.   Cardiovascular:      Rate and Rhythm: Regular rhythm.   Pulmonary:      Breath sounds: Normal breath sounds.   Abdominal:      Palpations: Abdomen is soft.   Musculoskeletal:         General: No swelling.   Neurological:      Mental Status: He is alert and oriented to person, place, and time.      Comments: Bilateral barefoot skin diabetic exam is normal, visualized feet and the appearance is normal.  Bilateral monofilament/sensation of both feet is normal.  Pulsation pedal pulse exam of both lower legs/feet is normal as well.               Assessment & Plan:   1. Special screening for malignant neoplasms, colon -referred to gi for cscope, needs asap   2. Essential hypertension -cont meds, at goal, wt loss   3. Mild dementia without behavioral disturbance, chotic disturbance, mood disturbance, or anxiety, unspecified dementia type (HCC) -mild, cont meds, recommended neuro fu   Dm followed by endo, did foot exam and documented correctly, encouraged urine for micro albumin asap, fu as sugars not optimally controlled, fu in 4 months    No orders of the defined types were placed in this  encounter.      Meds This Visit:  Requested Prescriptions      No prescriptions requested or ordered in this encounter       Imaging & Referrals:  SURGERY - INTERNAL         [1]   Current Outpatient Medications   Medication Sig Dispense Refill    Tirzepatide (MOUNJARO) 7.5 MG/0.5ML Subcutaneous Solution Auto-injector Inject 7.5 mg into the skin once a week. 2 mL 0    dapagliflozin (FARXIGA) 10 MG Oral Tab Take 1 tablet (10 mg total) by mouth every morning. 90 tablet 1    metFORMIN HCl 1000 MG Oral Tab Take 1 tablet (1,000 mg total) by mouth 2 (two) times daily with meals. 180 tablet 1    Continuous Glucose Sensor (DEXCOM G7 SENSOR) Does not apply Misc 1 each Every 10 days. 9 each 1    atorvastatin 20 MG Oral Tab Take 1 tablet (20 mg total) by mouth nightly. 90 tablet 3    lisinopril 20 MG Oral Tab Take 1 tablet (20 mg total) by mouth daily. 90 tablet 3    Continuous Blood Gluc  (DEXCOM G7 ) Does not apply Device USE 1 DEVICE AS DIRECTED      lactulose 10 GM/15ML Oral Solution Take 15 mL (10 g total) by mouth 2 (two) times daily.      cyanocobalamin 1000 MCG/ML Injection Solution       MEMANTINE HCL 10 MG Oral Tab TAKE 1 TABLET BY MOUTH TWICE A DAY 60 tablet 0    Calcium Carbonate Antacid 600 MG Oral Chew Tab Chew 600 mg by mouth as needed.        Coenzyme Q10 (COQ10) 400 MG Oral Cap Take by mouth.      Omega-3 Fatty Acids (FISH OIL) 600 MG Oral Cap Take  by mouth.      Wmaobemdf-Zizrsyrcs-Mydkoifkmi (CEREFOLIN NAC) 6-2-600 MG Oral Tab Take 1 tablet by mouth daily.     [2]   Allergies  Allergen Reactions    Other RASH and ITCHING     UV rays/Sunlight

## 2025-05-23 DIAGNOSIS — E11.65 TYPE 2 DIABETES MELLITUS WITH HYPERGLYCEMIA, WITHOUT LONG-TERM CURRENT USE OF INSULIN (HCC): ICD-10-CM

## 2025-05-27 RX ORDER — TIRZEPATIDE 7.5 MG/.5ML
7.5 INJECTION, SOLUTION SUBCUTANEOUS WEEKLY
Qty: 6 ML | Refills: 1 | Status: CANCELLED | OUTPATIENT
Start: 2025-05-27

## 2025-05-27 RX ORDER — TIRZEPATIDE 10 MG/.5ML
10 INJECTION, SOLUTION SUBCUTANEOUS WEEKLY
Qty: 2 ML | Refills: 2 | Status: SHIPPED | OUTPATIENT
Start: 2025-05-27

## 2025-05-27 NOTE — TELEPHONE ENCOUNTER
Phoned patient to answers below regarding Mounjaro 7.5mg:     - Side effects while on Mounjaro 7.5mg - mild constipation, manageble with eating vegetables. Effective - eating less, Requesting to increase to Mounjaro 10mg     - Not using Dexcom due to surrounding area getting itchy towards the end of usage. Attempted to rotate arms with each application. Checked blood sugar while on call 222, had lunch 15min prior to sugar check.    Currently taking diabetes medications   Metformin 1000mg BID, Farxiga 10mg daily, Mounjaro 7.5mg    Routed to the provider for review. Sent [employer] message with information for underpatches for Dexcom use. Phoned the patient to review.     Endocrine Refill protocol for oral and injectable diabetic medications    Protocol Criteria:  PASSED  Reason: N/A    If all below requirements are met, send a 90-day supply with 1 refill per provider protocol.    Verify appointment with Endocrinology completed in the last 6 months or scheduled in the next 3 months.  Verify A1C has been completed within the last 6 months and is below 8.5%     Last completed office visit: 4/23/2025 Amalia Olguin DO   Next scheduled Follow up:   Future Appointments   Date Time Provider Department Center   5/28/2025 10:30 AM Boy Rodriguez MD [id number]   7/2/2025 12:30 PM Amalia Olguin DO [id number]   12/22/2025  3:40 PM Armando Antunez MD [facility] [facility]      Last A1c result: Last A1c value was 7.7% done 2/21/2025.

## 2025-05-28 ENCOUNTER — OFFICE VISIT (OUTPATIENT)
Facility: LOCATION | Age: 75
End: 2025-05-28
Payer: MEDICARE

## 2025-05-28 VITALS
HEART RATE: 77 BPM | TEMPERATURE: 98 F | OXYGEN SATURATION: 98 % | DIASTOLIC BLOOD PRESSURE: 74 MMHG | SYSTOLIC BLOOD PRESSURE: 130 MMHG

## 2025-05-28 DIAGNOSIS — K42.9 UMBILICAL HERNIA WITHOUT OBSTRUCTION AND WITHOUT GANGRENE: Primary | ICD-10-CM

## 2025-05-28 DIAGNOSIS — Z12.11 ENCOUNTER FOR COLORECTAL CANCER SCREENING: ICD-10-CM

## 2025-05-28 DIAGNOSIS — Z12.12 ENCOUNTER FOR COLORECTAL CANCER SCREENING: ICD-10-CM

## 2025-05-28 PROCEDURE — 99213 OFFICE O/P EST LOW 20 MIN: CPT | Performed by: SURGERY

## 2025-05-28 RX ORDER — POLYETHYLENE GLYCOL 3350, SODIUM CHLORIDE, SODIUM BICARBONATE, POTASSIUM CHLORIDE 420; 11.2; 5.72; 1.48 G/4L; G/4L; G/4L; G/4L
POWDER, FOR SOLUTION ORAL
Qty: 1 EACH | Refills: 0 | Status: SHIPPED | OUTPATIENT
Start: 2025-05-28

## 2025-06-04 ENCOUNTER — TELEPHONE (OUTPATIENT)
Facility: LOCATION | Age: 75
End: 2025-06-04

## 2025-06-04 DIAGNOSIS — Z12.11 ENCOUNTER FOR SCREENING COLONOSCOPY: Primary | ICD-10-CM

## 2025-06-17 NOTE — H&P
New Patient  Behrooz Fallahi  4/24/1950       This patient was referred by Armando Antunez MD for evaluation and consultation for colonoscopy.    Chief Complaint: None    History of Present Illness:   Pt is a 76 y/o with significant family history of colon cancer who has had regular screening colonoscopies by Dr. Keating. His last colonoscopy was in 2021, which did not reveal any pathology. He reports a longstanding history of [facility].    Review of Systems:  Constitutional: No fevers or chills or recent weight loss, no recent sick contacts  Respiratory: Denies any shortness of breath, productive cough, dyspnea  Cardiovascular: No chest pain  Gastrointestinal: No nausea, vomiting, diarrhea, abdominal pain  Urologic: Denies any dysuria    Past Medical History:    Essential hypertension    Hematuria    Hyperlipidemia    Kidney stones    Obstructive sleep apnea (adult) (pediatric)    dx 2015 at [facility]  - AHI 6 autoPAP 6-16 Sleep RX     Type II or unspecified type diabetes mellitus without mention of complication, not stated as uncontrolled       Past Surgical History:   Procedure Laterality Date    Hernia surgery Bilateral 01/04/2018    inguinal hernia 1/4/2018 with Dr. Keating    Other surgical history  01/24/2014    Cystoscopy - Dr. Pathak     Repair ing hernia,5+y/o,reducibl         Social History     Socioeconomic History    Marital status:      Spouse name: Not on file    Number of children: Not on file    Years of education: Not on file    Highest education level: Not on file   Occupational History    Not on file   Tobacco Use    Smoking status: Never    Smokeless tobacco: Never   Vaping Use    Vaping status: Never Used   Substance and Sexual Activity    Alcohol use: No     Alcohol/week: 0.0 standard drinks of alcohol    Drug use: No    Sexual activity: Yes   Other Topics Concern    Caffeine Concern Yes     Comment: 3-4 cups coffee daily    Exercise Yes    Seat Belt Not Asked    Special Diet Not Asked    Stress  Concern Not Asked    Weight Concern Not Asked   Social History Narrative    The patient does not use an assistive device..      The patient DOES  live in a home with stairs.     Social Drivers of Health     Food Insecurity: Not on file   Transportation Needs: Not on file   Stress: Not on file   Housing Stability: Not on file         Current Outpatient Medications:     PEG 3350-KCl-Na Bicarb-NaCl 420 g Oral Recon Soln, Starting at 4:00 pm the night before procedure, drink 8 ounces of the prep every 15-20 minutes until finished, Disp: 1 each, Rfl: 0    Tirzepatide (MOUNJARO) 10 MG/0.5ML Subcutaneous Solution Auto-injector, Inject 10 mg into the skin once a week., Disp: 2 mL, Rfl: 2    dapagliflozin (FARXIGA) 10 MG Oral Tab, Take 1 tablet (10 mg total) by mouth every morning., Disp: 90 tablet, Rfl: 1    metFORMIN HCl 1000 MG Oral Tab, Take 1 tablet (1,000 mg total) by mouth 2 (two) times daily with meals., Disp: 180 tablet, Rfl: 1    Continuous Glucose Sensor (DEXCOM G7 SENSOR) Does not apply Misc, 1 each Every 10 days., Disp: 9 each, Rfl: 1    atorvastatin 20 MG Oral Tab, Take 1 tablet (20 mg total) by mouth nightly., Disp: 90 tablet, Rfl: 3    lisinopril 20 MG Oral Tab, Take 1 tablet (20 mg total) by mouth daily., Disp: 90 tablet, Rfl: 3    Continuous Blood Gluc  (DEXCOM G7 ) Does not apply Device, , Disp: , Rfl:     lactulose 10 GM/15ML Oral Solution, Take 15 mL (10 g total) by mouth in the morning and 15 mL (10 g total) before bedtime., Disp: , Rfl:     cyanocobalamin 1000 MCG/ML Injection Solution, , Disp: , Rfl:     MEMANTINE HCL 10 MG Oral Tab, TAKE 1 TABLET BY MOUTH TWICE A DAY, Disp: 60 tablet, Rfl: 0    Calcium Carbonate Antacid 600 MG Oral Chew Tab, Chew 600 mg by mouth as needed., Disp: , Rfl:     Coenzyme Q10 (COQ10) 400 MG Oral Cap, Take by mouth., Disp: , Rfl:     Omega-3 Fatty Acids (FISH OIL) 600 MG Oral Cap, Take by mouth., Disp: , Rfl:     Bzjatmalz-Urxbgxhtm-Keyfzvxbqg (CEREFOLIN  NAC) 6-2-600 MG Oral Tab, Take 1 tablet by mouth in the morning. (Patient not taking: Reported on 5/28/2025), Disp: , Rfl:     Allergies   Allergen Reactions    Other RASH and ITCHING     UV rays/Sunlight       Family History   Problem Relation Age of Onset    Cancer Father         prostate     Diabetes Mother     Diabetes Sister     Diabetes Sister        Objective/Physical Exam:  General: Alert, orientated x3.  Cooperative.  No apparent distress.  Vital Signs:  Blood pressure 130/74, pulse 77, temperature 97.9 °F (36.6 °C), temperature source Temporal, SpO2 98%. There is no height or weight on file to calculate BMI.  HEENT: NC/AT   Lungs: Even, unlabored  Cardiac: Regular rate   Abdomen: Soft, ND, NT, no R/G, umbilical bulge  Extremities:  SMAE  Skin: Warm & dry      Labs/Radiology:    CT A/P from March 2020 personally reviewed, evidence of broad based umbilicus with focus of fat, c/w early umbilical hernia     Discussed:   The potential surgical and non-surgical treatment options were discussed with the patient.  The potential risks, benefits, complications outcomes/recovery and alternatives to any proposed surgery were also fully reviewed with the patient. Any proposed surgical procedure was described in detail.  Expected postoperative pain, recuperation and postoperative course was discussed.  The patient verbalizes understanding.  All questions from the patient were discussed in detail to the patient's satisfaction.  No other questions were presented at this time.         Assessment/Plan:      Pt with asymptomatic fat containing umbilical hernia, will manage expectantly.   Plan for screening colonoscopy in pt with significant family history.         Thank you for allowing me to participate in your patient's care.         Boy Rodriguez MD    Please note that this report has been produced using speech recognition software and may contain errors related to that system including but not limited to errors in grammar,  punctuation and spelling as well as words and phrases that possibly may have been recognized inappropriately.  If there are any questions or concerns please contact the dictating provider for clarification.

## 2025-06-20 DIAGNOSIS — I10 ESSENTIAL HYPERTENSION: ICD-10-CM

## 2025-06-23 RX ORDER — LISINOPRIL 20 MG/1
20 TABLET ORAL DAILY
Qty: 90 TABLET | Refills: 3 | Status: SHIPPED | OUTPATIENT
Start: 2025-06-23

## 2025-07-02 ENCOUNTER — OFFICE VISIT (OUTPATIENT)
Facility: CLINIC | Age: 75
End: 2025-07-02
Payer: MEDICARE

## 2025-07-02 VITALS
BODY MASS INDEX: 27.7 KG/M2 | HEIGHT: 69 IN | RESPIRATION RATE: 18 BRPM | WEIGHT: 187 LBS | HEART RATE: 82 BPM | DIASTOLIC BLOOD PRESSURE: 78 MMHG | SYSTOLIC BLOOD PRESSURE: 120 MMHG | OXYGEN SATURATION: 99 %

## 2025-07-02 DIAGNOSIS — E11.65 TYPE 2 DIABETES MELLITUS WITH HYPERGLYCEMIA, WITHOUT LONG-TERM CURRENT USE OF INSULIN (HCC): Primary | ICD-10-CM

## 2025-07-02 DIAGNOSIS — B35.1 TOENAIL FUNGUS: ICD-10-CM

## 2025-07-02 LAB — HEMOGLOBIN A1C: 8.6 % (ref 4.3–5.6)

## 2025-07-02 PROCEDURE — 95249 CONT GLUC MNTR PT PROV EQP: CPT | Performed by: STUDENT IN AN ORGANIZED HEALTH CARE EDUCATION/TRAINING PROGRAM

## 2025-07-02 PROCEDURE — 83036 HEMOGLOBIN GLYCOSYLATED A1C: CPT | Performed by: STUDENT IN AN ORGANIZED HEALTH CARE EDUCATION/TRAINING PROGRAM

## 2025-07-02 PROCEDURE — 99214 OFFICE O/P EST MOD 30 MIN: CPT | Performed by: STUDENT IN AN ORGANIZED HEALTH CARE EDUCATION/TRAINING PROGRAM

## 2025-07-02 PROCEDURE — G2211 COMPLEX E/M VISIT ADD ON: HCPCS | Performed by: STUDENT IN AN ORGANIZED HEALTH CARE EDUCATION/TRAINING PROGRAM

## 2025-07-02 RX ORDER — PEN NEEDLE, DIABETIC 32GX 5/32"
NEEDLE, DISPOSABLE MISCELLANEOUS
Qty: 100 EACH | Refills: 3 | Status: SHIPPED | OUTPATIENT
Start: 2025-07-02

## 2025-07-02 RX ORDER — TIRZEPATIDE 10 MG/.5ML
10 INJECTION, SOLUTION SUBCUTANEOUS WEEKLY
Qty: 2 ML | Refills: 2 | Status: SHIPPED | OUTPATIENT
Start: 2025-07-02

## 2025-07-02 NOTE — PROGRESS NOTES
-----------------------------  Dexcom Clarity  -----------------------------  Behrooz Fallihi    YOB: 1950    Generated at: Wed, Jul 2, 2025 12:45 PM CDT    Reporting period: Thu Apr 10, 2025 - Wed Apr 23, 2025  -----------------------------  Glucose Details    Average glucose: 401 mg/dL    GMI: N/A    Standard deviation: 0 mg/dL    Coefficient of Variation: 0.0%  -----------------------------  Time in Range    Very High: 100%    High: 0%    In Range: 0%    Low: 0%    Very Low: 0%    Target Range   mg/dL    -----------------------------  Sensor usage    Days with data: 0/14    Time active: 1%    Avg. calibrations per day: 0.0

## 2025-07-02 NOTE — PROGRESS NOTES
-----------------------------  Dexcom Clarity  -----------------------------  Behrooz Fallihi    YOB: 1950    Generated at: Wed, Jul 2, 2025 12:55 PM CDT    Reporting period: Thu Jun 19, 2025 - Wed Jul 2, 2025  -----------------------------  Glucose Details    Average glucose: 203 mg/dL    GMI: 8.2%    Standard deviation: 50 mg/dL    Coefficient of Variation: 24.6%  -----------------------------  Time in Range    Very High: 18%    High: 42%    In Range: 40%    Low: 0%    Very Low: 0%    Target Range   mg/dL    -----------------------------  Sensor usage    Days with data: 10/14    Time active: 87%    Avg. calibrations per day: 0.0

## 2025-07-02 NOTE — PATIENT INSTRUCTIONS
Summary of today's visit:  Start 5 units of insulin before breakfast (inject about 5-10 minutes before you eat your breakfast).   We'll keep the same Mounjaro, Farxiga, metformin.   Send me your average in about a week and we can adjust.   Please schedule with podiatry to look at your toe.  Please schedule your diabetic eye exam prior to your follow up visit and have your doctor fax the report to our office.  Please complete your labs prior to your follow up visit.    General follow up information:  Please let us know if you require any refills at least 1 week prior to your medication running out. If you do run out of medication, please call our office ASAP to request refills (do not wait until your follow up).   Please call our office if sugars at home are consistently greater than 250 or less than 70 for medication adjustment (do not wait until your follow up appointment).  Lab results and imaging will typically be reviewed at follow up appointments, or within 3-5 business days of ALL results being in if you do not have an appointment scheduled in the near future. Our office will contact you for any abnormal results requiring more urgent follow up or action.   The on-call pager is for urgent matters only. If you are a type 1 diabetic and run out of insulin after business hours 8AM-4PM, you may call the on-call pager for a refill to a 24 hour pharmacy. All other refill requests should be requested during business hours.    Return Visit:  [] Schedule in 2-3 months with one of the physicians (Philomena Rausch, Favio, or Kasey) during Dr. Olguin's maternity leave  Please print podiatry referral      HOW TO TREAT LOW BLOOD SUGAR (Hypoglycemia)  Low blood sugar = Less than 70, or if you start to have symptoms  Symptoms: Shaking or trembling, fast heart rate, extreme hunger, sweating, confusion/difficulty concentrating, dizziness    How to treat a low blood sugar if you are able to eat/drink: The Rule of 15/15  If you are  using a continuous glucose monitor that says you are low, but you do not have any symptoms, verify on fingerstick that your blood sugar is actually <70 before treating.   Eat 15 grams of carbs (see examples below)  Check your blood sugar after 15 minutes. If it’s still below your target range, have another serving.   Repeat these steps until sugar is >90. Once it’s in range, if you're nervous about your sugar going low again, have a protein source (ie, a spoonful of peanut butter).   If you have a CGM, you want to look for how your arrow has changed. If you arrow is pointed up or sideways after 15 min, give your CGM more time OR check with a finger stick. Try not to eat more food until at least 15 min after the first BG check - otherwise you risk spiking your sugar too high.  If you are experiencing symptoms and you are unable to check your blood glucose for any reason, treat the hypoglycemia.  If someone has a low blood sugar and is unconscious: Don’t hesitate to call 911. Use emergency glucagon. If someone is unconscious and glucagon is not available or someone does not know how to use it, call 911 immediately.     To treat a low, carry with you easy, pre-portioned treatment for low blood sugars that are 15G of carbs:   - Children sized squeeze pouch applesauce (high fiber + carbs help prevent too high of a spike)  - Small children's sized juicebox- 15g carb --> 4oz juice box  - Glucose tablets from [employer], you can find them near diabetes supplies --> Note, you will need to eat 3-4 tablets to get to 15g of carbs  - Child sized fruit snack pack- look for one with 15 grams of total carbohydrate  - Choice of how to treat your low is important. Complex carbs, or foods that contain fats along with carbs (like chocolate) can slow the absorption of glucose and should NOT be used to treat an emergency low.

## 2025-07-02 NOTE — PROGRESS NOTES
[facility] Note    Name: Behrooz Fallahi    Date: 7/2/25    Behrooz Fallahi is a 75 year old male who presents for evaluation of T2DM management.     Chief complaint: Diabetes (Pt here for diabetes, pt here for diabetes, no other concerns /A1C-8.6/Last A1C-02/21/2025 7.7/Last Foot exam- 05/05/2025/Last Eye exam- 06/05/2024)       Subjective:   Initial HPI consult in May 2024    Interval History 8/5/24:  -Notes sugars have been notably higher the past several months despite strict adherence to medication and diet, no steroid administration or new medications  -No weight loss, jaundice, etc  -Walking 35-40 min every morning  -Dinner is largest meal and often contains rice    Interval History 11/15/24:  -Labs 8/6/24: , C-peptide measurable 3.2, LEIGH 65 negative, ZNT8 negative, pancreatic islet cell negative  -Not exercising much  -Predictable mealtime spikes with lunch, late afternoon, and dinner  -Feeling like his mental processing and focus is a little slower these days    Interval History 2/21/25:  -A1c 7.7% today  -Reports adherence to Ozempic 2 mg weekly (occasionally taking 10 days apart), Farxiga 10 mg daily, metformin 1000 mg twice daily, repaglinide 1 to 2 mg 3 times daily before meals (occasionally taking at the start of meal)  -On review of CGM as below, generally elevated sugars throughout the day with GMI of 8.4%  -Patient reports he struggling to avoid snacking and is eating a lot of high carb foods such as karina bread, dates, eating multiple fruits at 1 time, etc.  Struggling with these cravings and feels this is causing the spike in his sugar.  -Less motivated to exercise though he is taking daily walks with his wife    Interval history 4/23/25:  -Has not worn CGM for several weeks, last data from end of March  -Has been taking Mounjaro 5mg weekly, feels sugars are not as well controlled as with Ozempic and has not noticed any appetite suppression  -Adherent to metformin/Farxiga  -Not  checking fingersticks    Interval history 7/2/25:  -Current regimen: Mounjaro 10mg weekly, Farxiga 10mg dailiy, metformin 1000mg BID  -GMI 8.2% today, A1c 8.6%  -Still with notable post-breakfast spikes, pt reports this occurs when eating even relatively small meals  -Feels that Mounjaro is helping somewhat with weight/snacking though not notably better than Ozempic  -Eating karina bread with breakfast/dinner most days but trying to keep quantities small    DM hx:     -Re: potential DM medication contraindication (if positive, checkbox selected):  [] History of pancreatitis  [] Personal/fam hx of medullary thyroid cancer/MEN2  [] History of recent/frequent UTI/yeast infxn  [] Previous amputation related to diabetes    -Presence of associated DM complications (if positive, checkbox selected):  [] Macrovascular complications (CAD/CVA/PAD)  [] Neuropathy  [] Retinopathy  [] Nephropathy  [] HTN  [x] Hyperlipidemia (hyperTG)  [] Stroke/TIA  [] Gastroparesis      Continuous Glucose Monitoring Interpretation  Behrooz Fallahi has undergone continuous glucose monitoring with the Dexcom G7 CGM with reader.  The blood glucose tracings were evaluated for two weeks prior to office visit. Blood glucose tracings demonstrated areas of hyperglycemia throughout the day particularly after breakfast. Dinner is variable depending on meal.. There were no areas of hypoglycemia noted during the weeks of evaluation.      Very high 18%  High 42%  In range 40%  Low 0%  Very low 0%    Report from  will be scanned under media.      Previously trialed/failed DM meds: sulfonylureas (discontinued to hypoglycemia), repaglinide (ineffective), Ozempic (holding while trying Mounjaro)    History/Other:    Allergies, PMH, SocHx and FHx reviewed and updated as appropriate in Epic on    insulin aspart 100 Units/mL Subcutaneous Solution Pen-injector Inject 5 Units into the skin daily with breakfast. 6 mL 0    Insulin Pen Needle (BD PEN NEEDLE JESS  2ND GEN) 32G X 4 MM Does not apply Misc 3 injections daily 100 each 3    Tirzepatide (MOUNJARO) 10 MG/0.5ML Subcutaneous Solution Auto-injector Inject 10 mg into the skin once a week. 2 mL 2    lisinopril 20 MG Oral Tab Take 1 tablet (20 mg total) by mouth daily. 90 tablet 3    PEG 3350-KCl-Na Bicarb-NaCl 420 g Oral Recon Soln Starting at 4:00 pm the night before procedure, drink 8 ounces of the prep every 15-20 minutes until finished 1 each 0    dapagliflozin (FARXIGA) 10 MG Oral Tab Take 1 tablet (10 mg total) by mouth every morning. 90 tablet 1    metFORMIN HCl 1000 MG Oral Tab Take 1 tablet (1,000 mg total) by mouth 2 (two) times daily with meals. 180 tablet 1    Continuous Glucose Sensor (DEXCOM G7 SENSOR) Does not apply Misc 1 each Every 10 days. 9 each 1    atorvastatin 20 MG Oral Tab Take 1 tablet (20 mg total) by mouth nightly. 90 tablet 3    Continuous Blood Gluc  (DEXCOM G7 ) Does not apply Device       lactulose 10 GM/15ML Oral Solution Take 15 mL (10 g total) by mouth in the morning and 15 mL (10 g total) before bedtime.      cyanocobalamin 1000 MCG/ML Injection Solution       MEMANTINE HCL 10 MG Oral Tab TAKE 1 TABLET BY MOUTH TWICE A DAY 60 tablet 0    Calcium Carbonate Antacid 600 MG Oral Chew Tab Chew 600 mg by mouth as needed.      Coenzyme Q10 (COQ10) 400 MG Oral Cap Take by mouth.      Omega-3 Fatty Acids (FISH OIL) 600 MG Oral Cap Take by mouth.       Allergies   Allergen Reactions    Other RASH and ITCHING     UV rays/Sunlight     Current Outpatient Medications   Medication Sig Dispense Refill    insulin aspart 100 Units/mL Subcutaneous Solution Pen-injector Inject 5 Units into the skin daily with breakfast. 6 mL 0    Insulin Pen Needle (BD PEN NEEDLE JESS 2ND GEN) 32G X 4 MM Does not apply Misc 3 injections daily 100 each 3    Tirzepatide (MOUNJARO) 10 MG/0.5ML Subcutaneous Solution Auto-injector Inject 10 mg into the skin once a week. 2 mL 2    lisinopril 20 MG Oral Tab Take 1  tablet (20 mg total) by mouth daily. 90 tablet 3    PEG 3350-KCl-Na Bicarb-NaCl 420 g Oral Recon Soln Starting at 4:00 pm the night before procedure, drink 8 ounces of the prep every 15-20 minutes until finished 1 each 0    dapagliflozin (FARXIGA) 10 MG Oral Tab Take 1 tablet (10 mg total) by mouth every morning. 90 tablet 1    metFORMIN HCl 1000 MG Oral Tab Take 1 tablet (1,000 mg total) by mouth 2 (two) times daily with meals. 180 tablet 1    Continuous Glucose Sensor (DEXCOM G7 SENSOR) Does not apply Misc 1 each Every 10 days. 9 each 1    atorvastatin 20 MG Oral Tab Take 1 tablet (20 mg total) by mouth nightly. 90 tablet 3    Continuous Blood Gluc  (DEXCOM G7 ) Does not apply Device       lactulose 10 GM/15ML Oral Solution Take 15 mL (10 g total) by mouth in the morning and 15 mL (10 g total) before bedtime.      cyanocobalamin 1000 MCG/ML Injection Solution       MEMANTINE HCL 10 MG Oral Tab TAKE 1 TABLET BY MOUTH TWICE A DAY 60 tablet 0    Calcium Carbonate Antacid 600 MG Oral Chew Tab Chew 600 mg by mouth as needed.      Coenzyme Q10 (COQ10) 400 MG Oral Cap Take by mouth.      Omega-3 Fatty Acids (FISH OIL) 600 MG Oral Cap Take by mouth.      Wgqvtcgif-Kuehtlxhp-Odqqzupgqf (CEREFOLIN NAC) 6-2-600 MG Oral Tab Take 1 tablet by mouth in the morning. (Patient not taking: Reported on 7/2/2025)       Past Medical History:    Essential hypertension    Hematuria    Hyperlipidemia    Kidney stones    Obstructive sleep apnea (adult) (pediatric)    dx 2015 at [facility]  - AHI 6 autoPAP 6-16 Sleep RX     Type II or unspecified type diabetes mellitus without mention of complication, not stated as uncontrolled     Family History   Problem Relation Age of Onset    Cancer Father         prostate     Diabetes Mother     Diabetes Sister     Diabetes Sister      Social history: Reviewed.      ROS/Exam    REVIEW OF SYSTEMS: Ten point review of systems has been performed and is otherwise negative and/or  non-contributory, except as described above.     VITALS  Vitals:    07/02/25 1251   BP: 120/78   Pulse: 82   Resp: 18   SpO2: 99%   Weight: 187 lb (84.8 kg)   Height: 5' 9\" (1.753 m)             Wt Readings from Last 6 Encounters:   07/02/25 187 lb (84.8 kg)   05/05/25 187 lb (84.8 kg)   04/23/25 185 lb (83.9 kg)   02/21/25 185 lb (83.9 kg)   12/17/24 183 lb 3.2 oz (83.1 kg)   11/15/24 181 lb 9.6 oz (82.4 kg)       PHYSICAL EXAM  CONSTITUTIONAL:  awake, alert, cooperative, no apparent distress, and appears stated age   PSYCH: normal affect  LUNGS: breathing comfortably  CARDIOVASCULAR:  regular rate   NECK:  no palpable thyroid nodules     Labs/Imaging: Pertinent imaging reviewed.    Overall glucose control:  Lab Results   Component Value Date    A1C 8.6 (A) 07/02/2025    A1C 7.7 (A) 02/21/2025    A1C 7.6 (A) 11/15/2024    A1C 7.9 (A) 08/05/2024    A1C 7.3 (A) 05/03/2024       Supplementary Documentation:   -Surveillance for Diabetes Complications & Risks  Foot exam/neuropathy: Last Foot Exam: 07/02/24    Retinopathy screening: Last Dilated Eye Exam: 06/05/24  Eye Exam shows Diabetic Retinopathy?: No      Assessment & Plan:     ICD-10-CM    1. Type 2 diabetes mellitus with hyperglycemia, without long-term current use of insulin ([facility])  E11.65 POC Hemoglobin A1C     insulin aspart 100 Units/mL Subcutaneous Solution Pen-injector     Insulin Pen Needle (BD PEN NEEDLE JESS 2ND GEN) 32G X 4 MM Does not apply Misc     Tirzepatide (MOUNJARO) 10 MG/0.5ML Subcutaneous Solution Auto-injector     Podiatry Referral - In Network      2. Toenail fungus  B35.1 Podiatry Referral - In Network                Behrooz Fallahi is a pleasant 75 year old male here for evaluation of:    #Diabetes- PMHx of Type 2 diabetes mellitus.     Last A1c value was 8.6% done 7/2/2025. GMI 8.2%.  -Current meds: Mounjaro 10mg weekly, metformin 1000mg BID, Farxiga 10mg daily  -Previous: sulfonylureas (hypoglycemia), repaglinide (ineffective)  -Reviewed  multifactorial nature of successful glycemic control involving pharmacotherapy, diet and carbohydrate management, and optimization of physical activity. Reviewed goals of therapy with DM and rationale of A1C/glycemic goals in prevention of development/progression of organ damage and diabetes complications.  -Reviewed that avoidance of hypoglycemia is key in his age group  -JOSIE antibodies/labs negative  -Reviewed persistent postprandial highs particularly after breakfast despite uptitration of Mounjaro  -Start short-acting insulin 5 units before breakfast, reviewed appropriate administration  -Updated regimen: Mounjaro 10mg/week, Farxiga 10mg daily, metformin 1000mg BID, short acting insulin 5 units before breakfast only - pt to submit glucose log in 1-2 weeks for titration of insulin  -Referred to podiatry given thickening of R great toe, may need evaluation for fungus and DM foot care  -Reminded pt to have eye exam done and sent to our office  -Screening labs due around August 2025, reminded pt  -Pt to reintroduce exercise/walking    -See above header \"Supplementary Documentation\" for surveillance for diabetes complications & risks    #Nephropathy screening/CKD:   Lab Results   Component Value Date    EGFRCR 90 05/03/2025    MICROALBCREA 23.5 03/13/2023      #Blood pressure control: SBP is not to goal <130   BP Readings from Last 1 Encounters:   07/02/25 120/78   BP Meds: lisinopril Tabs - 20 MG     #Hyperlipidemia/Lipids: LDL is to goal   Lab Results   Component Value Date    LDL 77 08/06/2024    TRIG 167 (H) 08/06/2024   Cholesterol Meds: atorvastatin Tabs - 20 MG      The above plan was discussed in detail with the patient who verbalized understanding and agreement.      Amalia Olguin DO  [facility]  7/2/25     Note to patient: The 21 Century Cures Act makes medical notes like these available to patients in the interest of transparency. However, be advised this is a medical document. It is intended as  peer to peer communication. It is written in medical language and may contain abbreviations or verbiage that are unfamiliar. It may appear blunt or direct. Medical documents are intended to carry relevant information, facts as evident, and the clinical opinion of the practitioner.      In reviewing this note, please be advised that Dragon Voice Recognition software used to dictate the note may have made errors in recognizing some of the words or phrases.

## 2025-07-28 ENCOUNTER — PATIENT MESSAGE (OUTPATIENT)
Facility: CLINIC | Age: 75
End: 2025-07-28

## 2025-07-28 DIAGNOSIS — E11.65 TYPE 2 DIABETES MELLITUS WITH HYPERGLYCEMIA, WITHOUT LONG-TERM CURRENT USE OF INSULIN (HCC): ICD-10-CM

## 2025-07-28 RX ORDER — CHOLECALCIFEROL (VITAMIN D3) 25 MCG
1000 TABLET ORAL DAILY
COMMUNITY

## 2025-07-28 RX ORDER — MAGNESIUM 200 MG
400 TABLET ORAL DAILY
COMMUNITY

## 2025-07-29 RX ORDER — ACYCLOVIR 400 MG/1
1 TABLET ORAL
Qty: 9 EACH | Refills: 1 | Status: SHIPPED | OUTPATIENT
Start: 2025-07-29

## 2025-07-30 RX ORDER — TIRZEPATIDE 10 MG/.5ML
10 INJECTION, SOLUTION SUBCUTANEOUS WEEKLY
Qty: 2 ML | Refills: 2 | Status: SHIPPED | OUTPATIENT
Start: 2025-07-30

## 2025-08-07 ENCOUNTER — HOSPITAL ENCOUNTER (OUTPATIENT)
Facility: HOSPITAL | Age: 75
Setting detail: HOSPITAL OUTPATIENT SURGERY
Discharge: HOME | End: 2025-08-07
Attending: SURGERY | Admitting: SURGERY

## 2025-08-07 ENCOUNTER — ANESTHESIA (OUTPATIENT)
Dept: ENDOSCOPY | Facility: HOSPITAL | Age: 75
End: 2025-08-07

## 2025-08-07 ENCOUNTER — ANESTHESIA EVENT (OUTPATIENT)
Dept: ENDOSCOPY | Facility: HOSPITAL | Age: 75
End: 2025-08-07

## 2025-08-07 VITALS
BODY MASS INDEX: 26.66 KG/M2 | OXYGEN SATURATION: 95 % | WEIGHT: 180 LBS | TEMPERATURE: 98 F | RESPIRATION RATE: 18 BRPM | SYSTOLIC BLOOD PRESSURE: 145 MMHG | HEART RATE: 52 BPM | HEIGHT: 69 IN | DIASTOLIC BLOOD PRESSURE: 70 MMHG

## 2025-08-07 DIAGNOSIS — Z12.11 ENCOUNTER FOR SCREENING COLONOSCOPY: ICD-10-CM

## 2025-08-07 PROBLEM — K63.5 CECAL POLYP: Status: ACTIVE | Noted: 2025-08-07

## 2025-08-07 PROBLEM — K63.5 POLYP OF TRANSVERSE COLON: Status: ACTIVE | Noted: 2025-08-07

## 2025-08-07 LAB — GLUCOSE BLD-MCNC: 152 MG/DL (ref 70–99)

## 2025-08-07 PROCEDURE — 45385 COLONOSCOPY W/LESION REMOVAL: CPT | Performed by: SURGERY

## 2025-08-07 DEVICE — CLIP HEMSTAS W16MMXL230CM WRK CHN 2.8MM: Type: IMPLANTABLE DEVICE

## 2025-08-07 RX ORDER — NICOTINE POLACRILEX 4 MG
15 LOZENGE BUCCAL
Status: DISCONTINUED | OUTPATIENT
Start: 2025-08-07 | End: 2025-08-07

## 2025-08-07 RX ORDER — DEXTROSE MONOHYDRATE 25 G/50ML
50 INJECTION, SOLUTION INTRAVENOUS
Status: DISCONTINUED | OUTPATIENT
Start: 2025-08-07 | End: 2025-08-07

## 2025-08-07 RX ORDER — SODIUM CHLORIDE, SODIUM LACTATE, POTASSIUM CHLORIDE, CALCIUM CHLORIDE 600; 310; 30; 20 MG/100ML; MG/100ML; MG/100ML; MG/100ML
INJECTION, SOLUTION INTRAVENOUS CONTINUOUS
Status: DISCONTINUED | OUTPATIENT
Start: 2025-08-07 | End: 2025-08-07

## 2025-08-07 RX ORDER — NALOXONE HYDROCHLORIDE 0.4 MG/ML
0.08 INJECTION, SOLUTION INTRAMUSCULAR; INTRAVENOUS; SUBCUTANEOUS ONCE AS NEEDED
Status: DISCONTINUED | OUTPATIENT
Start: 2025-08-07 | End: 2025-08-07

## 2025-08-07 RX ORDER — ONDANSETRON 2 MG/ML
4 INJECTION INTRAMUSCULAR; INTRAVENOUS ONCE AS NEEDED
Status: DISCONTINUED | OUTPATIENT
Start: 2025-08-07 | End: 2025-08-07

## 2025-08-07 RX ORDER — LIDOCAINE HYDROCHLORIDE 10 MG/ML
INJECTION, SOLUTION EPIDURAL; INFILTRATION; INTRACAUDAL; PERINEURAL AS NEEDED
Status: DISCONTINUED | OUTPATIENT
Start: 2025-08-07 | End: 2025-08-07 | Stop reason: SURG

## 2025-08-07 RX ORDER — NICOTINE POLACRILEX 4 MG
30 LOZENGE BUCCAL
Status: DISCONTINUED | OUTPATIENT
Start: 2025-08-07 | End: 2025-08-07

## 2025-08-07 RX ADMIN — SODIUM CHLORIDE, SODIUM LACTATE, POTASSIUM CHLORIDE, CALCIUM CHLORIDE: 600; 310; 30; 20 INJECTION, SOLUTION INTRAVENOUS at 09:32:00

## 2025-08-07 RX ADMIN — SODIUM CHLORIDE, SODIUM LACTATE, POTASSIUM CHLORIDE, CALCIUM CHLORIDE: 600; 310; 30; 20 INJECTION, SOLUTION INTRAVENOUS at 10:43:00

## 2025-08-07 RX ADMIN — LIDOCAINE HYDROCHLORIDE 30 MG: 10 INJECTION, SOLUTION EPIDURAL; INFILTRATION; INTRACAUDAL; PERINEURAL at 09:42:00

## 2025-08-15 ENCOUNTER — TELEPHONE (OUTPATIENT)
Dept: INTERNAL MEDICINE CLINIC | Facility: CLINIC | Age: 75
End: 2025-08-15

## (undated) DEVICE — Device

## (undated) DEVICE — KIT MFLD FOR SPEC COLL

## (undated) DEVICE — KIT CUSTOM ENDOPROCEDURE STERIS

## (undated) DEVICE — ELECTRODE MONIT RED DOT 3PK

## (undated) DEVICE — CANISTER SUCT 1200CC LID BLU HRD ADPT AUTO

## (undated) DEVICE — KIT VLV 5 PC AIR H2O SUCT BX ENDOGATOR CONN

## (undated) DEVICE — ELECTRODE ES AD CRD L9FT COND ADH HYDRGEL REM

## (undated) NOTE — MR AVS SNAPSHOT
[facility],[address], [address]  [phone]               Thank you for choosing us for your health care visit with Tim Christie MD.  We are glad to serve you and happy to provide you with [facility] Take 1 tablet by mouth 2 (two) times daily. Commonly known as:  CIPRO           CoQ10 400 MG Caps   Take by mouth. Empagliflozin 10 MG Tabs   Take 1 tablet by mouth daily.    Commonly known as:  JARDIANCE           Fish Oil 600 MG Caps   Take  b

## (undated) NOTE — MR AVS SNAPSHOT
[facility] [address] [address] [address] [phone]               Thank you for choosing us for your health care visit with Louisa Patino PA-C.   We are glad to serve you and happy to provide you with this valerio aspirin 81 MG Chew   Chew 81 mg by mouth daily. Atorvastatin Calcium 10 MG Tabs   Commonly known as:  LIPITOR           Calcium Carbonate Antacid 600 MG Chew   Chew 600 mg by mouth. CEREFOLIN NAC 6-2-600 MG Tabs   Take  by mouth. DASH eating plan Adopt a diet rich in fruits, vegetables, and low fat dairy products with reduced content of saturated and total fat.    Dietary sodium reduction Reduce dietary sodium intake to <= 100 mmol per day (2.4 g sodium or 6 g sodium chloride)   Aer

## (undated) NOTE — MR AVS SNAPSHOT
[facility],[address], [address] [place] [address] Rd  [phone]               Thank you for choosing us for your health care visit with Tomas Wilson MD.  We are glad to serve you and happy to provide you with [facility] OPHTHALMOLOGY - INTERNAL [[id number] CUSTOM]  Order #:  [id number]         **REFERRAL REQUEST**    Your physician has referred you to a specialist.  Your physician or the clinic staff will provide you with the phone number you should call to schedule your ap Empagliflozin 25 MG Tabs   Take 1 tablet by mouth daily. What changed:  See the new instructions.    Commonly known as:  JARDIANCE           Famciclovir 500 MG Tabs   3 tab at onset of symptoms   Commonly known as:  FAMVIR           Fish Oil 600 MG Caps Please consider the following Lifestyle Modifications. Also, please return for a follow-up Blood Pressure Check in 1 year.      Lifestyle Modification Recommendations:    Modification Recommendation   Weight Reduction Maintain normal body weight (body mass

## (undated) NOTE — LETTER
08/12/21        [address]  [address] [facility] [address]      Dear Tyler Oquendo,    [address] [place] [address] records indicate that you have outstanding lab work and or testing that was ordered for you and has not yet been completed:  Orders Placed This Encoun

## (undated) NOTE — LETTER
10/17/19        [address] [address]      Dear Vanessa Mehta,     [address] [facility] records indicate that you have outstanding lab work and or testing that was ordered for you and has not yet been completed:  Orders Placed This Encounter

## (undated) NOTE — Clinical Note
I had the pleasure of seeing Mr. Charline Corley in my office today. Please see my attached note.       José Hernandez

## (undated) NOTE — Clinical Note
I had the pleasure of seeing Mr. Asif Collins in my office today. Please see my attached note.       Catalina Vaugahn

## (undated) NOTE — Clinical Note
I had the pleasure of seeing Mr. Keysha Lujan in my office today. Please see my attached note.       Kim Foss

## (undated) NOTE — Clinical Note
Pt has appt with you on Monday. Saw me acutely for DAMON NARVAEZ last week. DVT ruled out last week. Discussed with Quinton and Gail, they felt more likely orthopedic (possible ruptured bakers cyst).  His wife started him on Cipro and Amoxicillin due to her mulu

## (undated) NOTE — LETTER
[address]   Date:   9/3/2020     Name:   Angel Curiel    YOB: 1950   MRN:   [id number]       WHERE IS YOUR PAIN NOW? Karan the areas on your body where you feel the described sensations.   Use the appropriate sym

## (undated) NOTE — LETTER
[address]   Date:   3/9/2021     Name:   Goran Hebert    YOB: 1950   MRN:   [id number]       WHERE IS YOUR PAIN NOW? Karan the areas on your body where you feel the described sensations.   Use the appropriate sym

## (undated) NOTE — Clinical Note
I had the pleasure of seeing Mr. Daniel Baires in my office today. Please see my attached note.         Ambreen Alvarez

## (undated) NOTE — LETTER
[address]   Date:   12/8/2020     Name:   Georgie Arvizu    YOB: 1950   MRN:   [id number]       WHERE IS YOUR PAIN NOW? Karan the areas on your body where you feel the described sensations.   Use the appropriate sy

## (undated) NOTE — Clinical Note
I had the pleasure of seeing Mr. Laurel Mccartney in my office today. Please see my attached note.       Del Partida

## (undated) NOTE — LETTER
08/15/18    Patient: Sienna Abraham  : 1950 Visit date: 8/15/2018    Dear  Dr. Mikayla Monsalve MD,    Thank you for referring Sienna Abraham to my practice. Please find my assessment and plan below.          Assessment   Recurrent left inguinal hernia

## (undated) NOTE — MR AVS SNAPSHOT
[facility] [address] [address] [phone]               Thank you for choosing us for your health care visit with HERMINIA Troncoso.   We are glad to serve you and happy to provide you with this summar Commonly known as:  CIPRO           CoQ10 400 MG Caps   Take by mouth. Empagliflozin 10 MG Tabs   Take 1 tablet by mouth daily. What changed:  Another medication with the same name was removed.  Continue taking this medication, and follow the [facility] office, you can view your past visit information in [facility] by going to Visits < Visit Summaries. Slingbox questions? Call [phone] for help. Slingbox is NOT to be used for urgent needs. For medical emergencies, dial 911.         Educational Inform

## (undated) NOTE — LETTER
[address]   Date:   7/6/2021     Name:   Georgie Arvizu    YOB: 1950   MRN:   [id number]       WHERE IS YOUR PAIN NOW? Karan the areas on your body where you feel the described sensations.   Use the appropriate sym

## (undated) NOTE — MR AVS SNAPSHOT
[facility]  [address] [address] [phone]               Thank you for choosing us for your health care visit with Ross Burrell MD.  We are glad to serve you and happy to provide you with this summ Order:  Surgery - Internal    Tatiana Richardson MD   [address] [address]   Phone:  [phone]   Fax:  [phone]         Referral Orders      Normal Orders This Visit    GASTRO - INTERNAL [[id number] CUSTOM]  Order #:  [id number] Assoc Dx:   Left inguinal hernia [K40.90]          Reason for Today's Visit     Physical           Medical Issues Discussed Today     Routine general medical examination at a health care facility    -  Primary    Special screening for malignant neoplasms, [email or web address]. If you've recently had a stay at the Hospital you can access your discharge instructions in [employer] by going to Visits < Admission Summaries.  If you've been to the Emergency Department or your doctor's office, you can view yo

## (undated) NOTE — Clinical Note
I had the pleasure of seeing Mr. Vin Olivier in my office today. Please see my attached note.       Berna Schlatter

## (undated) NOTE — LETTER
[address]   Date:   10/6/2020     Name:   Madonna Ng    YOB: 1950   MRN:   [id number]       WHERE IS YOUR PAIN NOW? Karan the areas on your body where you feel the described sensations.   Use the appropriate sy